# Patient Record
Sex: MALE | Race: OTHER | Employment: OTHER | URBAN - METROPOLITAN AREA
[De-identification: names, ages, dates, MRNs, and addresses within clinical notes are randomized per-mention and may not be internally consistent; named-entity substitution may affect disease eponyms.]

---

## 2017-01-16 ENCOUNTER — PATIENT OUTREACH (OUTPATIENT)
Dept: FAMILY MEDICINE CLINIC | Facility: CLINIC | Age: 67
End: 2017-01-16

## 2017-03-06 ENCOUNTER — PRIOR ORIGINAL RECORDS (OUTPATIENT)
Dept: OTHER | Age: 67
End: 2017-03-06

## 2017-03-06 ENCOUNTER — MED REC SCAN ONLY (OUTPATIENT)
Dept: FAMILY MEDICINE CLINIC | Facility: CLINIC | Age: 67
End: 2017-03-06

## 2017-03-06 ENCOUNTER — OFFICE VISIT (OUTPATIENT)
Dept: FAMILY MEDICINE CLINIC | Facility: CLINIC | Age: 67
End: 2017-03-06

## 2017-03-06 VITALS
WEIGHT: 201 LBS | TEMPERATURE: 98 F | HEIGHT: 69 IN | BODY MASS INDEX: 29.77 KG/M2 | HEART RATE: 60 BPM | DIASTOLIC BLOOD PRESSURE: 76 MMHG | SYSTOLIC BLOOD PRESSURE: 112 MMHG

## 2017-03-06 DIAGNOSIS — Z12.11 COLON CANCER SCREENING: ICD-10-CM

## 2017-03-06 DIAGNOSIS — Z11.59 ENCOUNTER FOR HEPATITIS C SCREENING TEST FOR LOW RISK PATIENT: ICD-10-CM

## 2017-03-06 DIAGNOSIS — R93.1 ELEVATED CORONARY ARTERY CALCIUM SCORE: ICD-10-CM

## 2017-03-06 DIAGNOSIS — Z00.00 ENCOUNTER FOR ANNUAL HEALTH EXAMINATION: Primary | ICD-10-CM

## 2017-03-06 DIAGNOSIS — E78.00 PURE HYPERCHOLESTEROLEMIA: ICD-10-CM

## 2017-03-06 DIAGNOSIS — R73.03 PRE-DIABETES: ICD-10-CM

## 2017-03-06 DIAGNOSIS — E55.9 VITAMIN D DEFICIENCY: ICD-10-CM

## 2017-03-06 DIAGNOSIS — Z12.5 PROSTATE CANCER SCREENING: ICD-10-CM

## 2017-03-06 DIAGNOSIS — R07.2 PRECORDIAL PAIN: ICD-10-CM

## 2017-03-06 LAB
25-HYDROXYVITAMIN D (TOTAL): 20.4 NG/ML (ref 30–100)
ALBUMIN SERPL-MCNC: 3.9 G/DL (ref 3.5–4.8)
ALP LIVER SERPL-CCNC: 80 U/L (ref 45–117)
ALT SERPL-CCNC: 27 U/L (ref 17–63)
AST SERPL-CCNC: 21 U/L (ref 15–41)
BASOPHILS # BLD AUTO: 0.04 X10(3) UL (ref 0–0.1)
BASOPHILS NFR BLD AUTO: 0.6 %
BILIRUB SERPL-MCNC: 0.3 MG/DL (ref 0.1–2)
BUN BLD-MCNC: 15 MG/DL (ref 8–20)
CALCIUM BLD-MCNC: 9.2 MG/DL (ref 8.3–10.3)
CHLORIDE: 109 MMOL/L (ref 101–111)
CHOLEST SMN-MCNC: 167 MG/DL (ref ?–200)
CO2: 26 MMOL/L (ref 22–32)
COMPLEXED PSA SERPL-MCNC: 1.23 NG/ML (ref 0.01–4)
CREAT BLD-MCNC: 1.02 MG/DL (ref 0.7–1.3)
EOSINOPHIL # BLD AUTO: 0.28 X10(3) UL (ref 0–0.3)
EOSINOPHIL NFR BLD AUTO: 4.3 %
ERYTHROCYTE [DISTWIDTH] IN BLOOD BY AUTOMATED COUNT: 12.8 % (ref 11.5–16)
EST. AVERAGE GLUCOSE BLD GHB EST-MCNC: 128 MG/DL (ref 68–126)
GLUCOSE BLD-MCNC: 91 MG/DL (ref 70–99)
HBA1C MFR BLD HPLC: 6.1 % (ref ?–5.7)
HCT VFR BLD AUTO: 43.6 % (ref 37–53)
HDLC SERPL-MCNC: 47 MG/DL (ref 45–?)
HDLC SERPL: 3.55 {RATIO} (ref ?–4.97)
HEPATITIS C VIRUS AB INTERPRETATION: NONREACTIVE
HGB BLD-MCNC: 14 G/DL (ref 13–17)
IMMATURE GRANULOCYTE COUNT: 0.01 X10(3) UL (ref 0–1)
IMMATURE GRANULOCYTE RATIO %: 0.2 %
LDLC SERPL CALC-MCNC: 65 MG/DL (ref ?–130)
LYMPHOCYTES # BLD AUTO: 2.37 X10(3) UL (ref 0.9–4)
LYMPHOCYTES NFR BLD AUTO: 36.6 %
M PROTEIN MFR SERPL ELPH: 7.6 G/DL (ref 6.1–8.3)
MCH RBC QN AUTO: 30.4 PG (ref 27–33.2)
MCHC RBC AUTO-ENTMCNC: 32.1 G/DL (ref 31–37)
MCV RBC AUTO: 94.8 FL (ref 80–99)
MONOCYTES # BLD AUTO: 0.71 X10(3) UL (ref 0.1–0.6)
MONOCYTES NFR BLD AUTO: 11 %
NEUTROPHIL ABS PRELIM: 3.06 X10 (3) UL (ref 1.3–6.7)
NEUTROPHILS # BLD AUTO: 3.06 X10(3) UL (ref 1.3–6.7)
NEUTROPHILS NFR BLD AUTO: 47.3 %
NONHDLC SERPL-MCNC: 120 MG/DL (ref ?–130)
PLATELET # BLD AUTO: 218 10(3)UL (ref 150–450)
POTASSIUM SERPL-SCNC: 4 MMOL/L (ref 3.6–5.1)
RBC # BLD AUTO: 4.6 X10(6)UL (ref 3.8–5.8)
RED CELL DISTRIBUTION WIDTH-SD: 44.3 FL (ref 35.1–46.3)
SODIUM SERPL-SCNC: 141 MMOL/L (ref 136–144)
TRIGLYCERIDES: 274 MG/DL (ref ?–150)
TSI SER-ACNC: 2.39 MIU/ML (ref 0.35–5.5)
VLDL: 55 MG/DL (ref 5–40)
WBC # BLD AUTO: 6.5 X10(3) UL (ref 4–13)

## 2017-03-06 PROCEDURE — 85025 COMPLETE CBC W/AUTO DIFF WBC: CPT | Performed by: FAMILY MEDICINE

## 2017-03-06 PROCEDURE — 84443 ASSAY THYROID STIM HORMONE: CPT | Performed by: FAMILY MEDICINE

## 2017-03-06 PROCEDURE — 93000 ELECTROCARDIOGRAM COMPLETE: CPT | Performed by: FAMILY MEDICINE

## 2017-03-06 PROCEDURE — 96160 PT-FOCUSED HLTH RISK ASSMT: CPT | Performed by: FAMILY MEDICINE

## 2017-03-06 PROCEDURE — 82306 VITAMIN D 25 HYDROXY: CPT | Performed by: FAMILY MEDICINE

## 2017-03-06 PROCEDURE — 86803 HEPATITIS C AB TEST: CPT | Performed by: FAMILY MEDICINE

## 2017-03-06 PROCEDURE — G0439 PPPS, SUBSEQ VISIT: HCPCS | Performed by: FAMILY MEDICINE

## 2017-03-06 PROCEDURE — 99397 PER PM REEVAL EST PAT 65+ YR: CPT | Performed by: FAMILY MEDICINE

## 2017-03-06 PROCEDURE — 80061 LIPID PANEL: CPT | Performed by: FAMILY MEDICINE

## 2017-03-06 PROCEDURE — 83036 HEMOGLOBIN GLYCOSYLATED A1C: CPT | Performed by: FAMILY MEDICINE

## 2017-03-06 PROCEDURE — 36415 COLL VENOUS BLD VENIPUNCTURE: CPT | Performed by: FAMILY MEDICINE

## 2017-03-06 PROCEDURE — 80053 COMPREHEN METABOLIC PANEL: CPT | Performed by: FAMILY MEDICINE

## 2017-03-06 NOTE — PROGRESS NOTES
HPI:   Danya Mathews is a 77year old male who presents for a Medicare Subsequent Annual Wellness visit (Pt already had Initial Annual Wellness). Pt reports feeling well in general. No significant concerns.  He still works at [employer], he is quite encounter (Office Visit) with Bernarda Calix MD.   MEDICAL INFORMATION:   He  has a past medical history of Other and unspecified hyperlipidemia. He  has past surgical history that includes other surgical history.     His family history includes Obesity no drainage or sinus tenderness   Throat: Lips, mucosa, and tongue normal; teeth and gums normal   Neck: Supple, symmetrical, trachea midline, no adenopathy, thyroid: not enlarged, symmetric, no tenderness/mass/nodules, no  Thyromegaly, no JVD   Back:   Sy [E]; Future  -     Lipid Panel [E]; Future  -     CARD ECHO STRESS ECHO/REST AND STRESS (CPT=93271); Future    Pre-diabetes  -     Comp Metabolic Panel (14) [E]; Future  -     Hemoglobin A1C [E];  Future  -     CARD ECHO STRESS ECHO/REST AND STRESS (CPT=933 Interaction;Puzzles;Games; Visiting Friends; Visiting Family    If you are a male age 38-65 or a female age 47-67, do you take aspirin?: Yes    Have you had any immunizations at another office such as Influenza, Hepatitis B, Tetanus, or Pneumococcal?: No week is this?: Correct    What month is it?: Correct    What year is it?: Correct    Recall \"Ball\": Correct    Recall \"Flag\": Correct    Recall \"Tree\": Correct       This section provided for quick review of chart, separate sheet to patient  [facility] Media TOXOIDS AND ACELLULAR PERTUSIS VACCINE (TDAP), >7 YEARS, IM USE            SPECIFIC DISEASE MONITORING Internal Lab or Procedure External Lab or Procedure   Annual Monitoring of Persistent     Medications (ACE/ARB, digoxin diuretics, anticonvulsants.)    P

## 2017-03-07 ENCOUNTER — TELEPHONE (OUTPATIENT)
Dept: FAMILY MEDICINE CLINIC | Facility: CLINIC | Age: 67
End: 2017-03-07

## 2017-03-07 NOTE — TELEPHONE ENCOUNTER
Notes Recorded by Bere Pineda MD on 3/6/2017 at 11:50 PM  Please notify pt or his dtr that labs look good overall, including LDL and HDL cholesterol, blood counts, PSA, kidneys, liver, electrolytes, thyroid and negative Hep C.  The only mild abnormalitie

## 2017-03-09 ENCOUNTER — TELEPHONE (OUTPATIENT)
Dept: FAMILY MEDICINE CLINIC | Facility: CLINIC | Age: 67
End: 2017-03-09

## 2017-03-09 NOTE — TELEPHONE ENCOUNTER
If he'd like a named diet to look up online and follow it's the mediterranean diet. If he'd rather meet with someone in person to brainstorm specific ideas for him I'd recommend he meet with a dietitian.  Let me know if he prefers that, can place referral.

## 2017-03-10 NOTE — TELEPHONE ENCOUNTER
Spoke with the daughter and I advised of the notes from Dr. Eva Quiñones- she states that they were looking for a specific answer when it comes to sweet bread pastries- Nunu Truong states that the pt told his spouse that he could have it everyday just no pop or candy.

## 2017-03-14 ENCOUNTER — TELEPHONE (OUTPATIENT)
Dept: FAMILY MEDICINE CLINIC | Facility: CLINIC | Age: 67
End: 2017-03-14

## 2017-03-14 NOTE — TELEPHONE ENCOUNTER
Information entered into [facility] online- and case # [id number]  This has been sent to the medical director for evaluation-  We should have an answer in 48 hours

## 2017-03-17 ENCOUNTER — HOSPITAL ENCOUNTER (OUTPATIENT)
Dept: CV DIAGNOSTICS | Age: 67
Discharge: HOME | End: 2017-03-17
Attending: FAMILY MEDICINE
Payer: MEDICARE

## 2017-03-17 DIAGNOSIS — R93.1 ELEVATED CORONARY ARTERY CALCIUM SCORE: ICD-10-CM

## 2017-03-17 DIAGNOSIS — R07.2 PRECORDIAL PAIN: ICD-10-CM

## 2017-03-17 DIAGNOSIS — E78.00 PURE HYPERCHOLESTEROLEMIA: ICD-10-CM

## 2017-03-17 DIAGNOSIS — R73.03 PRE-DIABETES: ICD-10-CM

## 2017-03-17 PROCEDURE — 93350 STRESS TTE ONLY: CPT | Performed by: INTERNAL MEDICINE

## 2017-03-17 PROCEDURE — 93018 CV STRESS TEST I&R ONLY: CPT | Performed by: INTERNAL MEDICINE

## 2017-03-17 PROCEDURE — 93017 CV STRESS TEST TRACING ONLY: CPT

## 2017-03-17 PROCEDURE — 93350 STRESS TTE ONLY: CPT

## 2017-03-17 NOTE — TELEPHONE ENCOUNTER
[facility] to find out what the status of this case is as the pt is scheduled today for this test  I spoke with Ms. Mccurdy and gave additional information- case was approved  [id number] valid until 5/1/17

## 2017-03-21 ENCOUNTER — TELEPHONE (OUTPATIENT)
Dept: FAMILY MEDICINE CLINIC | Facility: CLINIC | Age: 67
End: 2017-03-21

## 2017-03-21 NOTE — TELEPHONE ENCOUNTER
----- Message from Sayra Cardoso MD sent at 3/20/2017 11:36 PM CDT -----  Please notify pt's dtr that overall the stress test is good news--there is no obvious abnormality.  However, I'd still like him to meet with a heart doctor-I want to get their opinion

## 2017-03-21 NOTE — TELEPHONE ENCOUNTER
Called the daughter back and advised of the test results and recommendations she will call [facility] tomorrow and get back to me so I can put in the referral for the pt's insurance

## 2017-03-21 NOTE — TELEPHONE ENCOUNTER
Message  Received:  Today       EDIN Sena Melissa Nurse       Caller: BRENDAN--DAUGHTER (Today, 4:59 PM)                     PLEASE CALL BRENDAN BACK WHEN AVAILABLE--[phone]

## 2017-03-24 ENCOUNTER — TELEPHONE (OUTPATIENT)
Dept: FAMILY MEDICINE CLINIC | Facility: CLINIC | Age: 67
End: 2017-03-24

## 2017-03-24 NOTE — TELEPHONE ENCOUNTER
Abagail Shone is aware Dr Rhett Menchaca will not be in office today due to he is in surgery. Regina aware per Dr Rhett Menchaca I can schedule the pt for a colonoscopy and he will see him the morning of the procedure.  Abagail Shone would like instructions for the procedure mailed to her

## 2017-03-24 NOTE — TELEPHONE ENCOUNTER
I spoke to [place] du sac and scheduled the pt for his colonoscopy on 4/28/17 at [facility]. Instructions mailed to the pt. Prep sent to [facility].

## 2017-03-25 ENCOUNTER — TELEPHONE (OUTPATIENT)
Dept: FAMILY MEDICINE CLINIC | Facility: CLINIC | Age: 67
End: 2017-03-25

## 2017-03-28 ENCOUNTER — TELEPHONE (OUTPATIENT)
Dept: FAMILY MEDICINE CLINIC | Facility: CLINIC | Age: 67
End: 2017-03-28

## 2017-03-28 NOTE — TELEPHONE ENCOUNTER
I spoke to [place] du sac and we r/s the pt's colonoscopy to 5/1/17 at [facility]. lorena

## 2017-03-31 ENCOUNTER — TELEPHONE (OUTPATIENT)
Dept: FAMILY MEDICINE CLINIC | Facility: CLINIC | Age: 67
End: 2017-03-31

## 2017-03-31 ENCOUNTER — PRIOR ORIGINAL RECORDS (OUTPATIENT)
Dept: OTHER | Age: 67
End: 2017-03-31

## 2017-03-31 ENCOUNTER — MYAURORA ACCOUNT LINK (OUTPATIENT)
Dept: OTHER | Age: 67
End: 2017-03-31

## 2017-03-31 NOTE — TELEPHONE ENCOUNTER
Never received a call back informing  Us of the doc that he was seeing    Referral placed in the [facility] system for Dr. Meena Riley cardiology-  Ref # [id number] valid 3/31/17-10/01/17 for 6 visits    Faxed a copy to Dr. Brigette Adams office [phone]

## 2017-04-07 LAB
ALBUMIN: 3.9 G/DL
ALKALINE PHOSPHATATE(ALK PHOS): 80 IU/L
BILIRUBIN TOTAL: 0.3 MG/DL
BUN: 15 MG/DL
CALCIUM: 9.2 MG/DL
CHLORIDE: 109 MEQ/L
CHOLESTEROL, TOTAL: 167 MG/DL
CREATININE, SERUM: 1.02 MG/DL
GLUCOSE: 91 MG/DL
HDL CHOLESTEROL: 47 MG/DL
HEMATOCRIT: 43.6 %
HEMOGLOBIN A1C: 6.1 %
HEMOGLOBIN: 14 G/DL
LDL CHOLESTEROL: 65 MG/DL
PLATELETS: 218 K/UL
POTASSIUM, SERUM: 4 MEQ/L
PROTEIN, TOTAL: 7.6 G/DL
RED BLOOD COUNT: 4.6 X 10-6/U
SGOT (AST): 21 IU/L
SGPT (ALT): 27 IU/L
SODIUM: 141 MEQ/L
THYROID STIMULATING HORMONE: 2.39 MLU/L
TRIGLYCERIDES: 274 MG/DL
VITAMIN D 25-OH: 20.4 NG/ML
WHITE BLOOD COUNT: 6.5 X 10-3/U

## 2017-04-10 ENCOUNTER — TELEPHONE (OUTPATIENT)
Dept: FAMILY MEDICINE CLINIC | Facility: CLINIC | Age: 67
End: 2017-04-10

## 2017-04-10 DIAGNOSIS — Z12.11 ENCOUNTER FOR SCREENING COLONOSCOPY: Primary | ICD-10-CM

## 2017-04-10 NOTE — TELEPHONE ENCOUNTER
I spoke to [place] du sac. She is aware Dr Ben Burns does not do colonoscopies at [address] [place] [address] is aware there is a Dr Nancy Sanches who does colonoscopy at [facility] [place].  [place] du sac states she will call Dr Mancini Shelter office to see if this is an option for her dad and call me back to debora

## 2017-04-10 NOTE — TELEPHONE ENCOUNTER
Spoke with [place] du sac and she states that her dad would like to see Dr. Aditya Bowen for the colonoscopy- he is local and can do the procedure at [facility].    I checked and Dr. Aditya Bowen is in network so I placed the referral in [facility] online # [id number] valid 4/10/17-1

## 2017-04-12 NOTE — TELEPHONE ENCOUNTER
I spoke to [place] du sac and she states the pt will be seeing Dr Mel Vail for his colonoscopy. Colonoscopy with Dr Raisa Leiva is cancelled.  lorena

## 2017-04-18 ENCOUNTER — TELEPHONE (OUTPATIENT)
Dept: FAMILY MEDICINE CLINIC | Facility: CLINIC | Age: 67
End: 2017-04-18

## 2017-04-18 NOTE — TELEPHONE ENCOUNTER
Received fax from Dr. Liborio Hogan office for endoscopy operative reports with corresponding pathology and any labs or diagnostic imagine within the past year. There are no endoscopy reports or imaging reports. What labs do you want sent from 3/6/17?

## 2017-05-30 ENCOUNTER — MED REC SCAN ONLY (OUTPATIENT)
Dept: FAMILY MEDICINE CLINIC | Facility: CLINIC | Age: 67
End: 2017-05-30

## 2017-06-08 ENCOUNTER — MED REC SCAN ONLY (OUTPATIENT)
Dept: FAMILY MEDICINE CLINIC | Facility: CLINIC | Age: 67
End: 2017-06-08

## 2017-06-13 ENCOUNTER — MED REC SCAN ONLY (OUTPATIENT)
Dept: FAMILY MEDICINE CLINIC | Facility: CLINIC | Age: 67
End: 2017-06-13

## 2018-01-12 ENCOUNTER — TELEPHONE (OUTPATIENT)
Dept: FAMILY MEDICINE CLINIC | Facility: CLINIC | Age: 68
End: 2018-01-12

## 2018-02-01 ENCOUNTER — PATIENT OUTREACH (OUTPATIENT)
Dept: FAMILY MEDICINE CLINIC | Facility: CLINIC | Age: 68
End: 2018-02-01

## 2018-03-20 NOTE — TELEPHONE ENCOUNTER
After Visit Summary   3/20/2018    Hugo Longoria    MRN: [id number]           Patient Information     Date Of Birth          1961        Visit Information        Provider Department      3/20/2018 8:00 PM PH BED 2 [facility]        Today's Diagnoses     Insomnia, unspecified type           Follow-ups after your visit        Your next 10 appointments already scheduled     Mar 28, 2018  8:30 AM CDT   Return Sleep Patient with Christiano Ponce PA-C   [facility] ([facility])    [address] [place] [id number] [phone]              Who to contact     If you have questions or need follow up information about today's clinic visit or your schedule please contact [facility] directly at [phone].  Normal or non-critical lab and imaging results will be communicated to you by MyChart, letter or phone within 4 business days after the clinic has received the results. If you do not hear from us within 7 days, please contact the clinic through [employer] or phone. If you have a critical or abnormal lab result, we will notify you by phone as soon as possible.  Submit refill requests through [employer] or call your pharmacy and they will forward the refill request to us. Please allow 3 business days for your refill to be completed.          Additional Information About Your Visit        MyChart Information     Nu[employer] gives you secure access to your electronic health record. If you see a primary care provider, you can also send messages to your care team and make appointments. If you have questions, please call your primary care clinic.  If you do not have a primary care provider, please call [phone] and they will assist you.        Care EveryWhere ID     This is your Care EveryWhere ID. This could be used by other organizations to access your [facility] medical records  [id number]         Blood  Patient has been notified by [address] [facility] speaking staff, Jeff Ortiz MA     Recall in epic for 1 year.  Will need annual exam and labs Pressure from Last 3 Encounters:   02/19/18 138/80   02/12/18 138/68   01/23/18 132/82    Weight from Last 3 Encounters:   02/19/18 103.5 kg (228 lb 3.2 oz)   02/12/18 105.2 kg (232 lb)   01/23/18 105.3 kg (232 lb 3.2 oz)              We Performed the Following     Comprehensive Sleep Study        Primary Care Provider Office Phone # Fax #    Viktor Gomez,  [phone] [address] [place] [address]        Equal Access to Services     [facility]: Hadii jose l ku hadasho Soomaali, waaxda luqadaha, qaybta kaalmada adesandeepyacarmine, kelly khalil . So [facility] [phone].    ATENCIÓN: Si habla español, tiene a byrd disposición servicios gratuitos de asistencia lingüística. [facility] [phone].    We comply with applicable federal civil rights laws and [place] laws. We do not discriminate on the basis of race, color, national origin, age, disability, sex, sexual orientation, or gender identity.            Thank you!     Thank you for choosing [facility]  for your care. Our goal is always to provide you with excellent care. Hearing back from our patients is one way we can continue to improve our services. Please take a few minutes to complete the written survey that you may receive in the mail after your visit with us. Thank you!             Your Updated Medication List - Protect others around you: Learn how to safely use, store and throw away your medicines at www.disposemymeds.org.          This list is accurate as of 3/20/18 11:59 PM.  Always use your most recent med list.                   Brand Name Dispense Instructions for use Diagnosis    amoxicillin-clavulanate 875-125 MG per tablet    AUGMENTIN    20 tablet    Take 1 tablet by mouth 2 times daily    Acute sinusitis with symptoms > 10 days       ascorbic acid 250 MG Chew chewable tablet    vitamin C     Take 250 mg by mouth daily    Primary osteoarthritis of right knee        atorvastatin 20 MG tablet    LIPITOR    30 tablet    Take 1 tablet (20 mg) by mouth daily    CARDIOVASCULAR SCREENING; LDL GOAL LESS THAN 130       fluticasone 50 MCG/ACT spray    FLONASE    1 Bottle    Spray 1-2 sprays into both nostrils daily    Acute sinusitis with symptoms > 10 days       LANsoprazole 30 MG CR capsule    PREVACID    30 capsule    Take 1 pill daily if Ranitidne fails.    Chronic gastritis without bleeding, unspecified gastritis type, Gastroesophageal reflux disease with esophagitis       lisinopril 20 MG tablet    PRINIVIL/ZESTRIL    90 tablet    TAKE ONE-HALF TABLET BY MOUTH ONCE DAILY    HTN, goal below 140/90       MULTIVITAMINS PO      Take  by mouth.        zolpidem 10 MG tablet    AMBIEN    1 tablet    Take tablet by mouth 15 minutes prior to sleep, for Sleep Study    Insomnia, unspecified type

## 2018-04-04 RX ORDER — ATORVASTATIN CALCIUM 20 MG/1
TABLET, FILM COATED ORAL
Qty: 30 TABLET | Refills: 1 | Status: SHIPPED | OUTPATIENT
Start: 2018-04-04 | End: 2018-05-03

## 2018-04-04 NOTE — TELEPHONE ENCOUNTER
Last refill: 12- #30 with 11 refills    Last Visit: 3-  Next Visit: Future Appointments  Date Time Provider Sonam Elisa   5/16/2018 3:00 PM Piter Wheeler MD [facility] HARJEET Nicolas         Forward to Dr. Cathern Hatchet please advise on refills.  Thank

## 2018-05-04 RX ORDER — ATORVASTATIN CALCIUM 20 MG/1
TABLET, FILM COATED ORAL
Qty: 90 TABLET | Refills: 1 | Status: SHIPPED | OUTPATIENT
Start: 2018-05-04 | End: 2018-07-11

## 2018-05-16 ENCOUNTER — OFFICE VISIT (OUTPATIENT)
Dept: FAMILY MEDICINE CLINIC | Facility: CLINIC | Age: 68
End: 2018-05-16

## 2018-05-16 VITALS
BODY MASS INDEX: 30.56 KG/M2 | SYSTOLIC BLOOD PRESSURE: 120 MMHG | HEIGHT: 68 IN | TEMPERATURE: 98 F | WEIGHT: 201.63 LBS | RESPIRATION RATE: 20 BRPM | DIASTOLIC BLOOD PRESSURE: 84 MMHG | HEART RATE: 68 BPM

## 2018-05-16 DIAGNOSIS — Z12.5 ENCOUNTER FOR SCREENING FOR MALIGNANT NEOPLASM OF PROSTATE: ICD-10-CM

## 2018-05-16 DIAGNOSIS — H91.93 DECREASED HEARING OF BOTH EARS: ICD-10-CM

## 2018-05-16 DIAGNOSIS — E55.9 VITAMIN D DEFICIENCY: ICD-10-CM

## 2018-05-16 DIAGNOSIS — R73.03 PRE-DIABETES: ICD-10-CM

## 2018-05-16 DIAGNOSIS — R93.1 ELEVATED CORONARY ARTERY CALCIUM SCORE: ICD-10-CM

## 2018-05-16 DIAGNOSIS — R73.09 ELEVATED HEMOGLOBIN A1C: ICD-10-CM

## 2018-05-16 DIAGNOSIS — G89.29 CHRONIC PAIN OF LEFT KNEE: ICD-10-CM

## 2018-05-16 DIAGNOSIS — R39.198 SLOW URINARY STREAM: ICD-10-CM

## 2018-05-16 DIAGNOSIS — Z00.00 ENCOUNTER FOR ANNUAL HEALTH EXAMINATION: Primary | ICD-10-CM

## 2018-05-16 DIAGNOSIS — E78.00 PURE HYPERCHOLESTEROLEMIA: ICD-10-CM

## 2018-05-16 DIAGNOSIS — M25.562 CHRONIC PAIN OF LEFT KNEE: ICD-10-CM

## 2018-05-16 DIAGNOSIS — Z23 NEED FOR VACCINATION: ICD-10-CM

## 2018-05-16 PROCEDURE — 82306 VITAMIN D 25 HYDROXY: CPT | Performed by: FAMILY MEDICINE

## 2018-05-16 PROCEDURE — 80053 COMPREHEN METABOLIC PANEL: CPT | Performed by: FAMILY MEDICINE

## 2018-05-16 PROCEDURE — 36415 COLL VENOUS BLD VENIPUNCTURE: CPT | Performed by: FAMILY MEDICINE

## 2018-05-16 PROCEDURE — 83036 HEMOGLOBIN GLYCOSYLATED A1C: CPT | Performed by: FAMILY MEDICINE

## 2018-05-16 PROCEDURE — G0439 PPPS, SUBSEQ VISIT: HCPCS | Performed by: FAMILY MEDICINE

## 2018-05-16 PROCEDURE — 80061 LIPID PANEL: CPT | Performed by: FAMILY MEDICINE

## 2018-05-16 NOTE — PROGRESS NOTES
HPI:   Jaime German is a 76year old male who presents for a Medicare Subsequent Annual Wellness visit (Pt already had Initial Annual Wellness).     Pt c/o intermittent L knee pain for over a year, comes and goes, but seems to be getting more severe/pro a Living Will on file in [facility] Not Discussed       He does NOT have a Power of  for [employer] on file in [facility]    Not Discussed         He has never smoked tobacco.  Mr. Alee Whitley already takes aspirin and has it on his medication list.   CAGE Alco that he does not drink alcohol or use drugs. REVIEW OF SYSTEMS:   Review of Systems   Constitutional: Negative. HENT: Negative. Eyes: Negative. Respiratory: Negative. Cardiovascular: Negative. Gastrointestinal: Negative.     Endocrine: Ne normal and breath sounds normal.   Abdominal: Soft. Bowel sounds are normal. He exhibits no mass. There is no hepatosplenomegaly. No hernia. Genitourinary:   Genitourinary Comments: deferred   Musculoskeletal: Normal range of motion.    Normal bi knee exa of both ears  -stable, no further w/u or tx at this time    Vitamin D deficiency--assess control today  -     VITAMIN D, 25-HYDROXY; Future    Slow urinary stream  -     PSA SCREEN; Future    Encounter for screening for malignant neoplasm of prostate   - every 10 years No results found for this or any previous visit. No flowsheet data found. Fecal Occult Blood Annually No results found for: FOB No flowsheet data found.     Glaucoma Screening      Ophthalmology Visit Annually: Diabetics, FHx Glaucoma, AA

## 2018-05-18 ENCOUNTER — TELEPHONE (OUTPATIENT)
Dept: FAMILY MEDICINE CLINIC | Facility: CLINIC | Age: 68
End: 2018-05-18

## 2018-05-18 DIAGNOSIS — R73.03 PRE-DIABETES: Primary | ICD-10-CM

## 2018-05-18 DIAGNOSIS — R73.09 ELEVATED HEMOGLOBIN A1C: ICD-10-CM

## 2018-05-18 DIAGNOSIS — E78.00 PURE HYPERCHOLESTEROLEMIA: ICD-10-CM

## 2018-05-18 NOTE — TELEPHONE ENCOUNTER
----- Message from Calvin Chang MD sent at 5/17/2018 11:41 PM CDT -----  Please notify(Qatari preferred) blood sugar has gone up a bit and he is nearly diaetic. Similarly triglycerides a bit elevated.   Time to crack down on his eating habits, decreasing

## 2018-05-24 ENCOUNTER — TELEPHONE (OUTPATIENT)
Dept: FAMILY MEDICINE CLINIC | Facility: CLINIC | Age: 68
End: 2018-05-24

## 2018-05-24 NOTE — TELEPHONE ENCOUNTER
Patient's daughter advised of lab results. See telephone encounter from 5/18/18. Recall In Epic for A1C and lipids. Verbalized understanding. No further questions.

## 2018-05-24 NOTE — TELEPHONE ENCOUNTER
Daughter would like patient's most recent results. She said we call him with them the other day and he would like us to explain them to his daughter.  She is aware that Dr. Charisma Wallace is out of the office until tomorrow and a call back tomorrow would be fine with

## 2018-07-06 RX ORDER — ATORVASTATIN CALCIUM 20 MG/1
TABLET, FILM COATED ORAL
Qty: 30 TABLET | Refills: 0 | OUTPATIENT
Start: 2018-07-06

## 2018-07-06 NOTE — TELEPHONE ENCOUNTER
Last refill on Atorvastatin 5 4 2018 #90 with 1 refill   Receipt confirmed by pharmacy   Refill refused

## 2018-07-11 RX ORDER — ATORVASTATIN CALCIUM 20 MG/1
TABLET, FILM COATED ORAL
Qty: 30 TABLET | Refills: 0 | OUTPATIENT
Start: 2018-07-11

## 2018-07-11 RX ORDER — ATORVASTATIN CALCIUM 20 MG/1
TABLET, FILM COATED ORAL
Qty: 90 TABLET | Refills: 1 | Status: SHIPPED | OUTPATIENT
Start: 2018-07-11 | End: 2018-11-30

## 2018-07-11 NOTE — TELEPHONE ENCOUNTER
[facility] pharmacy called because they received a denial for the atorvastatin. I explained that there is not a denial in the pt's chart.     Pharmacy states that they did not get the 90 day refill in April

## 2018-11-17 ENCOUNTER — TELEPHONE (OUTPATIENT)
Dept: FAMILY MEDICINE CLINIC | Facility: CLINIC | Age: 68
End: 2018-11-17

## 2018-11-17 NOTE — TELEPHONE ENCOUNTER
PT NEEDS REFILL OF     ATORVASTATIN 20 MG Oral Tab    PLEASE SEND 90 DAY SUPPLY TO   [facility]:PT HAS APT ON SAT 12/8/18 W/DR ROMAN    THANK YOU

## 2018-11-30 RX ORDER — ATORVASTATIN CALCIUM 20 MG/1
TABLET, FILM COATED ORAL
Qty: 90 TABLET | Refills: 1 | Status: SHIPPED | OUTPATIENT
Start: 2018-11-30 | End: 2018-12-10

## 2018-11-30 NOTE — TELEPHONE ENCOUNTER
Last refill on Atorvastatin #90 with 1 refill on 7 11 2018   Last lipid panel on 5 16 2018   Total - 148  Trig- 232  HDL- 48  LDL-54  OV on 12 8 2018

## 2018-12-08 ENCOUNTER — OFFICE VISIT (OUTPATIENT)
Dept: FAMILY MEDICINE CLINIC | Facility: CLINIC | Age: 68
End: 2018-12-08
Payer: MEDICARE

## 2018-12-08 VITALS
SYSTOLIC BLOOD PRESSURE: 128 MMHG | TEMPERATURE: 97 F | WEIGHT: 197 LBS | RESPIRATION RATE: 16 BRPM | BODY MASS INDEX: 29.18 KG/M2 | DIASTOLIC BLOOD PRESSURE: 64 MMHG | HEIGHT: 69 IN | HEART RATE: 60 BPM

## 2018-12-08 DIAGNOSIS — E78.00 PURE HYPERCHOLESTEROLEMIA: ICD-10-CM

## 2018-12-08 DIAGNOSIS — R93.1 AGATSTON CORONARY ARTERY CALCIUM SCORE GREATER THAN 400: ICD-10-CM

## 2018-12-08 DIAGNOSIS — R73.09 ELEVATED HEMOGLOBIN A1C: ICD-10-CM

## 2018-12-08 DIAGNOSIS — R73.03 PRE-DIABETES: Primary | ICD-10-CM

## 2018-12-08 DIAGNOSIS — Z92.89 H/O CARDIOVASCULAR STRESS TEST: ICD-10-CM

## 2018-12-08 PROCEDURE — 80061 LIPID PANEL: CPT | Performed by: FAMILY MEDICINE

## 2018-12-08 PROCEDURE — 90670 PCV13 VACCINE IM: CPT | Performed by: FAMILY MEDICINE

## 2018-12-08 PROCEDURE — G0008 ADMIN INFLUENZA VIRUS VAC: HCPCS | Performed by: FAMILY MEDICINE

## 2018-12-08 PROCEDURE — 83036 HEMOGLOBIN GLYCOSYLATED A1C: CPT | Performed by: FAMILY MEDICINE

## 2018-12-08 PROCEDURE — 36415 COLL VENOUS BLD VENIPUNCTURE: CPT | Performed by: FAMILY MEDICINE

## 2018-12-08 PROCEDURE — G0009 ADMIN PNEUMOCOCCAL VACCINE: HCPCS | Performed by: FAMILY MEDICINE

## 2018-12-08 PROCEDURE — 90653 IIV ADJUVANT VACCINE IM: CPT | Performed by: FAMILY MEDICINE

## 2018-12-08 PROCEDURE — 99214 OFFICE O/P EST MOD 30 MIN: CPT | Performed by: FAMILY MEDICINE

## 2018-12-08 NOTE — PROGRESS NOTES
Paz Lincoln is a 76year old male. HPI:   Patient here to f/u on his blood work from last spring.       Drinks water, 1 coffee and 12 oz 2% milk daily, no pop, no juice, rare alcohol.    1 donut consumed every day (homemade), 1 ear of corn every day (at nourished,in no apparent distress  NECK: supple,no adenopathy,no JVD, no thyromegaly  LUNGS: clear to auscultation  CARDIO: RRR without murmur  EXTREMITIES: no cyanosis, clubbing or edema    ASSESSMENT AND PLAN:   Diagnoses and all orders for this visit:

## 2018-12-10 ENCOUNTER — TELEPHONE (OUTPATIENT)
Dept: FAMILY MEDICINE CLINIC | Facility: CLINIC | Age: 68
End: 2018-12-10

## 2018-12-10 DIAGNOSIS — R73.03 PREDIABETES: Primary | ICD-10-CM

## 2018-12-10 RX ORDER — ATORVASTATIN CALCIUM 20 MG/1
TABLET, FILM COATED ORAL
Qty: 90 TABLET | Refills: 3 | Status: SHIPPED | OUTPATIENT
Start: 2018-12-10 | End: 2019-09-09

## 2018-12-10 NOTE — TELEPHONE ENCOUNTER
----- Message from Cole Zuniga MD sent at 12/9/2018 12:28 PM CST -----  pelase notify patient blood sugar stable, hanging out in prediabetes range very close to diabetes. Cholesterol panel looks good.   His eating habits/exercise have been pretty good ov

## 2018-12-10 NOTE — TELEPHONE ENCOUNTER
Patient has been notified, verbalized understanding of information. Denies further questions. Recall placed for 6 months.

## 2019-01-01 ENCOUNTER — OFFICE VISIT (OUTPATIENT)
Dept: HEMATOLOGY/ONCOLOGY | Facility: HOSPITAL | Age: 69
End: 2019-01-01
Attending: INTERNAL MEDICINE
Payer: MEDICARE

## 2019-01-01 VITALS
SYSTOLIC BLOOD PRESSURE: 134 MMHG | TEMPERATURE: 98 F | HEART RATE: 60 BPM | DIASTOLIC BLOOD PRESSURE: 80 MMHG | RESPIRATION RATE: 16 BRPM | BODY MASS INDEX: 31.67 KG/M2 | WEIGHT: 209 LBS | HEIGHT: 67.99 IN

## 2019-01-01 DIAGNOSIS — I26.99 BILATERAL PULMONARY EMBOLISM (HCC): ICD-10-CM

## 2019-01-01 DIAGNOSIS — R11.0 CHEMOTHERAPY-INDUCED NAUSEA: ICD-10-CM

## 2019-01-01 DIAGNOSIS — R06.2 WHEEZING: ICD-10-CM

## 2019-01-01 DIAGNOSIS — Z79.01 ON CONTINUOUS ORAL ANTICOAGULATION: ICD-10-CM

## 2019-01-01 DIAGNOSIS — I82.412 ACUTE DEEP VEIN THROMBOSIS (DVT) OF FEMORAL VEIN OF LEFT LOWER EXTREMITY (HCC): ICD-10-CM

## 2019-01-01 DIAGNOSIS — C25.9 PANCREATIC CARCINOMA (HCC): Primary | ICD-10-CM

## 2019-01-01 DIAGNOSIS — C78.00 MALIGNANT NEOPLASM METASTATIC TO LUNG, UNSPECIFIED LATERALITY (HCC): ICD-10-CM

## 2019-01-01 DIAGNOSIS — R53.0 NEOPLASTIC (MALIGNANT) RELATED FATIGUE: ICD-10-CM

## 2019-01-01 DIAGNOSIS — C78.7 LIVER METASTASES (HCC): ICD-10-CM

## 2019-01-01 DIAGNOSIS — T45.1X5A CHEMOTHERAPY-INDUCED NAUSEA: ICD-10-CM

## 2019-01-01 DIAGNOSIS — Z86.718 HISTORY OF DVT (DEEP VEIN THROMBOSIS): ICD-10-CM

## 2019-01-01 DIAGNOSIS — T45.1X5D ADVERSE EFFECT OF CHEMOTHERAPY, SUBSEQUENT ENCOUNTER: ICD-10-CM

## 2019-01-01 PROCEDURE — 99215 OFFICE O/P EST HI 40 MIN: CPT | Performed by: INTERNAL MEDICINE

## 2019-01-01 PROCEDURE — 96411 CHEMO IV PUSH ADDL DRUG: CPT

## 2019-01-01 PROCEDURE — 96367 TX/PROPH/DG ADDL SEQ IV INF: CPT

## 2019-01-01 PROCEDURE — 86301 IMMUNOASSAY TUMOR CA 19-9: CPT

## 2019-01-01 PROCEDURE — 96413 CHEMO IV INFUSION 1 HR: CPT

## 2019-01-01 PROCEDURE — 96375 TX/PRO/DX INJ NEW DRUG ADDON: CPT

## 2019-01-01 PROCEDURE — 96372 THER/PROPH/DIAG INJ SC/IM: CPT

## 2019-01-01 PROCEDURE — 80053 COMPREHEN METABOLIC PANEL: CPT

## 2019-01-01 PROCEDURE — 85025 COMPLETE CBC W/AUTO DIFF WBC: CPT

## 2019-01-01 RX ORDER — LORAZEPAM 2 MG/ML
INJECTION INTRAMUSCULAR EVERY 4 HOURS PRN
Status: CANCELLED | OUTPATIENT
Start: 2019-01-01

## 2019-01-01 RX ORDER — ATROPINE SULFATE 0.4 MG/ML
0.2 AMPUL (ML) INJECTION ONCE
Status: CANCELLED | OUTPATIENT
Start: 2019-01-01

## 2019-01-01 RX ORDER — LORAZEPAM 0.5 MG/1
TABLET ORAL EVERY 4 HOURS PRN
Status: CANCELLED | OUTPATIENT
Start: 2019-01-01

## 2019-01-01 RX ORDER — METOCLOPRAMIDE HYDROCHLORIDE 5 MG/ML
10 INJECTION INTRAMUSCULAR; INTRAVENOUS EVERY 6 HOURS PRN
Status: CANCELLED | OUTPATIENT
Start: 2019-01-01

## 2019-01-01 RX ORDER — FLUOROURACIL 50 MG/ML
400 INJECTION, SOLUTION INTRAVENOUS ONCE
Status: COMPLETED | OUTPATIENT
Start: 2019-01-01 | End: 2019-01-01

## 2019-01-01 RX ORDER — FLUOROURACIL 50 MG/ML
2400 INJECTION, SOLUTION INTRAVENOUS CONTINUOUS
Status: DISCONTINUED | OUTPATIENT
Start: 2019-01-01 | End: 2019-01-01

## 2019-01-01 RX ORDER — ONDANSETRON 2 MG/ML
8 INJECTION INTRAMUSCULAR; INTRAVENOUS EVERY 6 HOURS PRN
Status: CANCELLED | OUTPATIENT
Start: 2019-01-01

## 2019-01-01 RX ORDER — PROCHLORPERAZINE MALEATE 10 MG
10 TABLET ORAL EVERY 6 HOURS PRN
Status: CANCELLED | OUTPATIENT
Start: 2019-01-01

## 2019-01-01 RX ORDER — FLUOROURACIL 50 MG/ML
2400 INJECTION, SOLUTION INTRAVENOUS CONTINUOUS
Status: CANCELLED | OUTPATIENT
Start: 2019-01-01

## 2019-01-01 RX ORDER — ATROPINE SULFATE 0.4 MG/ML
0.2 AMPUL (ML) INJECTION ONCE
Status: COMPLETED | OUTPATIENT
Start: 2019-01-01 | End: 2019-01-01

## 2019-01-01 RX ORDER — FLUOROURACIL 50 MG/ML
400 INJECTION, SOLUTION INTRAVENOUS ONCE
Status: CANCELLED | OUTPATIENT
Start: 2019-01-01

## 2019-01-01 RX ORDER — SODIUM CHLORIDE 0.9 % (FLUSH) 0.9 %
10 SYRINGE (ML) INJECTION ONCE
Status: CANCELLED | OUTPATIENT
Start: 2019-01-01

## 2019-01-01 RX ORDER — SODIUM CHLORIDE 0.9 % (FLUSH) 0.9 %
10 SYRINGE (ML) INJECTION ONCE
Status: COMPLETED | OUTPATIENT
Start: 2019-01-01 | End: 2019-01-01

## 2019-01-01 RX ADMIN — FLUOROURACIL 5000 MG: 50 INJECTION, SOLUTION INTRAVENOUS at 13:24:00

## 2019-01-01 RX ADMIN — SODIUM CHLORIDE 0.9 % (FLUSH) 10 ML: 0.9 % SYRINGE (ML) INJECTION at 11:15:00

## 2019-01-01 RX ADMIN — ATROPINE SULFATE 0.2 MG: 0.4 MG/ML AMPUL (ML) INJECTION at 11:34:00

## 2019-01-01 RX ADMIN — FLUOROURACIL 850 MG: 50 INJECTION, SOLUTION INTRAVENOUS at 13:15:00

## 2019-02-08 ENCOUNTER — PATIENT OUTREACH (OUTPATIENT)
Dept: FAMILY MEDICINE CLINIC | Facility: CLINIC | Age: 69
End: 2019-02-08

## 2019-02-19 ENCOUNTER — TELEPHONE (OUTPATIENT)
Dept: FAMILY MEDICINE CLINIC | Facility: CLINIC | Age: 69
End: 2019-02-19

## 2019-02-19 ENCOUNTER — HOSPITAL ENCOUNTER (INPATIENT)
Facility: HOSPITAL | Age: 69
LOS: 3 days | Discharge: HOME | DRG: 435 | End: 2019-02-22
Attending: INTERNAL MEDICINE | Admitting: INTERNAL MEDICINE
Payer: MEDICARE

## 2019-02-19 DIAGNOSIS — I26.99 ACUTE PULMONARY EMBOLISM WITHOUT ACUTE COR PULMONALE, UNSPECIFIED PULMONARY EMBOLISM TYPE (HCC): Primary | ICD-10-CM

## 2019-02-19 PROBLEM — I82.90 VTE (VENOUS THROMBOEMBOLISM): Status: ACTIVE | Noted: 2019-02-19

## 2019-02-19 PROCEDURE — 99223 1ST HOSP IP/OBS HIGH 75: CPT | Performed by: HOSPITALIST

## 2019-02-19 RX ORDER — ATORVASTATIN CALCIUM 20 MG/1
20 TABLET, FILM COATED ORAL NIGHTLY
Status: DISCONTINUED | OUTPATIENT
Start: 2019-02-19 | End: 2019-02-22

## 2019-02-19 RX ORDER — ACETAMINOPHEN 325 MG/1
650 TABLET ORAL EVERY 6 HOURS PRN
Status: DISCONTINUED | OUTPATIENT
Start: 2019-02-19 | End: 2019-02-22

## 2019-02-19 RX ORDER — ONDANSETRON 2 MG/ML
4 INJECTION INTRAMUSCULAR; INTRAVENOUS EVERY 6 HOURS PRN
Status: DISCONTINUED | OUTPATIENT
Start: 2019-02-19 | End: 2019-02-22

## 2019-02-19 RX ORDER — HEPARIN SODIUM 5000 [USP'U]/ML
80 INJECTION INTRAVENOUS; SUBCUTANEOUS ONCE
Status: COMPLETED | OUTPATIENT
Start: 2019-02-19 | End: 2019-02-20

## 2019-02-19 RX ORDER — METOCLOPRAMIDE HYDROCHLORIDE 5 MG/ML
10 INJECTION INTRAMUSCULAR; INTRAVENOUS EVERY 8 HOURS PRN
Status: DISCONTINUED | OUTPATIENT
Start: 2019-02-19 | End: 2019-02-22

## 2019-02-19 RX ORDER — HEPARIN SODIUM AND DEXTROSE 10000; 5 [USP'U]/100ML; G/100ML
INJECTION INTRAVENOUS CONTINUOUS
Status: DISCONTINUED | OUTPATIENT
Start: 2019-02-20 | End: 2019-02-22

## 2019-02-19 RX ORDER — HEPARIN SODIUM AND DEXTROSE 10000; 5 [USP'U]/100ML; G/100ML
18 INJECTION INTRAVENOUS ONCE
Status: COMPLETED | OUTPATIENT
Start: 2019-02-19 | End: 2019-02-20

## 2019-02-19 NOTE — TELEPHONE ENCOUNTER
Spoke with the daughter and asked if the pt has traveled recently- she states that He went to [address] [place] [address] on a [email or web address] bus.     I advised that if the calf is swollen then he should be evaluated for a blood clot- advised that the pt can go to the urgent care or

## 2019-02-20 ENCOUNTER — APPOINTMENT (OUTPATIENT)
Dept: CV DIAGNOSTICS | Facility: HOSPITAL | Age: 69
DRG: 435 | End: 2019-02-20
Attending: HOSPITALIST
Payer: MEDICARE

## 2019-02-20 ENCOUNTER — APPOINTMENT (OUTPATIENT)
Dept: CT IMAGING | Facility: HOSPITAL | Age: 69
DRG: 435 | End: 2019-02-20
Attending: HOSPITALIST
Payer: MEDICARE

## 2019-02-20 LAB
AFP-TM SERPL-MCNC: 4.7 NG/ML (ref ?–8)
ALBUMIN SERPL-MCNC: 3.3 G/DL (ref 3.4–5)
ALBUMIN/GLOB SERPL: 0.8 {RATIO} (ref 1–2)
ALP LIVER SERPL-CCNC: 228 U/L (ref 45–117)
ALT SERPL-CCNC: 38 U/L (ref 16–61)
ANION GAP SERPL CALC-SCNC: 7 MMOL/L (ref 0–18)
APTT PPP: 148.7 SECONDS (ref 26.1–34.6)
APTT PPP: 189.7 SECONDS (ref 26.1–34.6)
APTT PPP: 39.7 SECONDS (ref 26.1–34.6)
AST SERPL-CCNC: 33 U/L (ref 15–37)
BASOPHILS # BLD AUTO: 0.03 X10(3) UL (ref 0–0.2)
BASOPHILS NFR BLD AUTO: 0.3 %
BILIRUB SERPL-MCNC: 0.8 MG/DL (ref 0.1–2)
BUN BLD-MCNC: 13 MG/DL (ref 7–18)
BUN/CREAT SERPL: 16.7 (ref 10–20)
CALCIUM BLD-MCNC: 8.3 MG/DL (ref 8.5–10.1)
CANCER AG19-9 SERPL-ACNC: ABNORMAL U/ML (ref ?–37)
CEA SERPL-MCNC: 586.5 NG/ML (ref ?–5)
CHLORIDE SERPL-SCNC: 109 MMOL/L (ref 98–107)
CO2 SERPL-SCNC: 27 MMOL/L (ref 21–32)
CREAT BLD-MCNC: 0.78 MG/DL (ref 0.7–1.3)
DEPRECATED RDW RBC AUTO: 43.8 FL (ref 35.1–46.3)
EOSINOPHIL # BLD AUTO: 0.33 X10(3) UL (ref 0–0.7)
EOSINOPHIL NFR BLD AUTO: 3.4 %
ERYTHROCYTE [DISTWIDTH] IN BLOOD BY AUTOMATED COUNT: 13.1 % (ref 11–15)
EST. AVERAGE GLUCOSE BLD GHB EST-MCNC: 146 MG/DL (ref 68–126)
GLOBULIN PLAS-MCNC: 4 G/DL (ref 2.8–4.4)
GLUCOSE BLD-MCNC: 110 MG/DL (ref 70–99)
GLUCOSE BLD-MCNC: 144 MG/DL (ref 70–99)
HBA1C MFR BLD HPLC: 6.7 % (ref ?–5.7)
HCT VFR BLD AUTO: 40.9 % (ref 39–53)
HGB BLD-MCNC: 13.3 G/DL (ref 13–17.5)
IMM GRANULOCYTES # BLD AUTO: 0.03 X10(3) UL (ref 0–1)
IMM GRANULOCYTES NFR BLD: 0.3 %
INR BLD: 1.28 (ref 0.9–1.1)
LDH SERPL L TO P-CCNC: 265 U/L (ref 87–241)
LYMPHOCYTES # BLD AUTO: 2.02 X10(3) UL (ref 1–4)
LYMPHOCYTES NFR BLD AUTO: 20.8 %
M PROTEIN MFR SERPL ELPH: 7.3 G/DL (ref 6.4–8.2)
MCH RBC QN AUTO: 29.9 PG (ref 26–34)
MCHC RBC AUTO-ENTMCNC: 32.5 G/DL (ref 31–37)
MCV RBC AUTO: 91.9 FL (ref 80–100)
MONOCYTES # BLD AUTO: 1.19 X10(3) UL (ref 0.1–1)
MONOCYTES NFR BLD AUTO: 12.3 %
NEUTROPHILS # BLD AUTO: 6.09 X10 (3) UL (ref 1.5–7.7)
NEUTROPHILS # BLD AUTO: 6.09 X10(3) UL (ref 1.5–7.7)
NEUTROPHILS NFR BLD AUTO: 62.9 %
OSMOLALITY SERPL CALC.SUM OF ELEC: 297 MOSM/KG (ref 275–295)
PLATELET # BLD AUTO: 133 10(3)UL (ref 150–450)
POTASSIUM SERPL-SCNC: 3.7 MMOL/L (ref 3.5–5.1)
PSA SERPL DL<=0.01 NG/ML-MCNC: 16.5 SECONDS (ref 12.4–14.7)
RBC # BLD AUTO: 4.45 X10(6)UL (ref 3.8–5.8)
SODIUM SERPL-SCNC: 143 MMOL/L (ref 136–145)
WBC # BLD AUTO: 9.7 X10(3) UL (ref 4–11)

## 2019-02-20 PROCEDURE — 99233 SBSQ HOSP IP/OBS HIGH 50: CPT | Performed by: HOSPITALIST

## 2019-02-20 PROCEDURE — 99223 1ST HOSP IP/OBS HIGH 75: CPT | Performed by: INTERNAL MEDICINE

## 2019-02-20 PROCEDURE — 93306 TTE W/DOPPLER COMPLETE: CPT | Performed by: HOSPITALIST

## 2019-02-20 PROCEDURE — 74177 CT ABD & PELVIS W/CONTRAST: CPT | Performed by: HOSPITALIST

## 2019-02-20 RX ORDER — POTASSIUM CHLORIDE 20 MEQ/1
40 TABLET, EXTENDED RELEASE ORAL ONCE
Status: COMPLETED | OUTPATIENT
Start: 2019-02-20 | End: 2019-02-20

## 2019-02-20 NOTE — IMAGING NOTE
Suha Boo RN notified Pt to get the CT scan done today. And prep for biopsy tomorrow. Tentative time 1130 AM. NPO for hrs.  PT/INR in AM. Hold heparin for 4 Hrs prior to the procedure

## 2019-02-20 NOTE — CONSULTS
[facility]  Report of Consultation    Kishore Gee Patient Status:  Inpatient    1950 MRN [id number]   [facility] 7NE-A Attending Hany Meier MD   Hosp Day # 1 PCP Silvia Ledezma MD     Reason for Consultation:    Bilateral PE 91.9 kg (202 lb 8 oz), SpO2 95 %. General: No acute distress. Alert and oriented x 3. Wife at bedside. HEENT: Moist mucous membranes. EOM-I. PERRL  Neck: No lymphadenopathy. No JVD. Respiratory: Clear to auscultation bilaterally. No wheezes.  No rhon

## 2019-02-20 NOTE — H&P
[facility]  History and Physical     Joe Mckinley Patient Status:  Inpatient    1950 MRN [id number]   [facility] 7NE-A Attending Cristhian Lin MD   Hosp Day # 0 PCP Latisha Alfaro MD     Chief Complaint: LLE pain     Hist Disp: 90 tablet Rfl: 3   Azelastine HCl 0.05 % Ophthalmic Solution Place 1 drop into both eyes 2 (two) times daily. Disp: 6 mL Rfl: 3   aspirin 81 MG Oral Tab Take 1 tablet (81 mg total) by mouth daily.  Disp:  Rfl: 0       Review of Systems:   A comprehens

## 2019-02-20 NOTE — PROGRESS NOTES
[facility]  Progress note     Shannan Astridil Patient Status:  Inpatient    1950 MRN [id number]   [facility] 7NE-A Attending Silvia Gardner MD   Hosp Day # 1 PCP Ira Mabry MD     Chief Complaint: LLE pain     Subjective:

## 2019-02-20 NOTE — PLAN OF CARE
HEMATOLOGIC - ADULT    • Maintains hematologic stability Progressing    • Free from bleeding injury Progressing        Patient/Family Goals    • Patient/Family Long Term Goal Progressing    • Patient/Family Short Term Goal Progressing        RESPIRATORY -

## 2019-02-20 NOTE — PLAN OF CARE
Assumed care @0283. A&Ox4, RA diminished. NSR. Heparin drip decreased to 15.5ml/hr with PTT of 189.7. Next PTT @ 2230. Denies pain. Bedrest.  Echo results 60%. CT abd with contrast today shows mets. Earl Denton Ultrasound liver with biopsy tomorrow @ 1130.   A

## 2019-02-21 ENCOUNTER — APPOINTMENT (OUTPATIENT)
Dept: ULTRASOUND IMAGING | Facility: HOSPITAL | Age: 69
DRG: 435 | End: 2019-02-21
Attending: INTERNAL MEDICINE
Payer: MEDICARE

## 2019-02-21 ENCOUNTER — APPOINTMENT (OUTPATIENT)
Dept: CT IMAGING | Facility: HOSPITAL | Age: 69
DRG: 435 | End: 2019-02-21
Attending: INTERNAL MEDICINE
Payer: MEDICARE

## 2019-02-21 LAB
APTT PPP: 57.7 SECONDS (ref 26.1–34.6)
GLUCOSE BLD-MCNC: 90 MG/DL (ref 70–99)
INR BLD: 1.16 (ref 0.9–1.1)
PLATELET # BLD AUTO: 156 10(3)UL (ref 150–450)
POTASSIUM SERPL-SCNC: 4 MMOL/L (ref 3.5–5.1)
PSA SERPL DL<=0.01 NG/ML-MCNC: 14.6 SECONDS (ref 12–14.1)

## 2019-02-21 PROCEDURE — 0FB13ZX EXCISION OF RIGHT LOBE LIVER, PERCUTANEOUS APPROACH, DIAGNOSTIC: ICD-10-PCS | Performed by: RADIOLOGY

## 2019-02-21 PROCEDURE — 99153 MOD SED SAME PHYS/QHP EA: CPT | Performed by: INTERNAL MEDICINE

## 2019-02-21 PROCEDURE — 76942 ECHO GUIDE FOR BIOPSY: CPT | Performed by: INTERNAL MEDICINE

## 2019-02-21 PROCEDURE — 99152 MOD SED SAME PHYS/QHP 5/>YRS: CPT | Performed by: INTERNAL MEDICINE

## 2019-02-21 PROCEDURE — 99232 SBSQ HOSP IP/OBS MODERATE 35: CPT | Performed by: HOSPITALIST

## 2019-02-21 PROCEDURE — 47000 NEEDLE BIOPSY OF LIVER PERQ: CPT | Performed by: INTERNAL MEDICINE

## 2019-02-21 PROCEDURE — 77012 CT SCAN FOR NEEDLE BIOPSY: CPT | Performed by: INTERNAL MEDICINE

## 2019-02-21 RX ORDER — MIDAZOLAM HYDROCHLORIDE 1 MG/ML
INJECTION INTRAMUSCULAR; INTRAVENOUS
Status: COMPLETED
Start: 2019-02-21 | End: 2019-02-21

## 2019-02-21 RX ORDER — MIDAZOLAM HYDROCHLORIDE 1 MG/ML
1 INJECTION INTRAMUSCULAR; INTRAVENOUS EVERY 5 MIN PRN
Status: DISCONTINUED | OUTPATIENT
Start: 2019-02-21 | End: 2019-02-21 | Stop reason: HOSPADM

## 2019-02-21 RX ORDER — NALOXONE HYDROCHLORIDE 0.4 MG/ML
80 INJECTION, SOLUTION INTRAMUSCULAR; INTRAVENOUS; SUBCUTANEOUS AS NEEDED
Status: DISCONTINUED | OUTPATIENT
Start: 2019-02-21 | End: 2019-02-21 | Stop reason: HOSPADM

## 2019-02-21 RX ORDER — FLUMAZENIL 0.1 MG/ML
INJECTION, SOLUTION INTRAVENOUS
Status: DISCONTINUED
Start: 2019-02-21 | End: 2019-02-21 | Stop reason: WASHOUT

## 2019-02-21 RX ORDER — NALOXONE HYDROCHLORIDE 0.4 MG/ML
INJECTION, SOLUTION INTRAMUSCULAR; INTRAVENOUS; SUBCUTANEOUS
Status: DISCONTINUED
Start: 2019-02-21 | End: 2019-02-21 | Stop reason: WASHOUT

## 2019-02-21 RX ORDER — SODIUM CHLORIDE 9 MG/ML
INJECTION, SOLUTION INTRAVENOUS CONTINUOUS
Status: DISCONTINUED | OUTPATIENT
Start: 2019-02-21 | End: 2019-02-21 | Stop reason: HOSPADM

## 2019-02-21 RX ORDER — FLUMAZENIL 0.1 MG/ML
0.2 INJECTION, SOLUTION INTRAVENOUS AS NEEDED
Status: DISCONTINUED | OUTPATIENT
Start: 2019-02-21 | End: 2019-02-21 | Stop reason: HOSPADM

## 2019-02-21 NOTE — IMAGING NOTE
Phoned report to [facility]. Pt has 2 tegaderms one to mid upper abd w small amount blood under tegaderm the other to r rib cage area c,d, I. Pt awake following instructions, states no pain. Pt transferred to room 7621, pt is hungry let staff know.

## 2019-02-21 NOTE — PROCEDURES
US could only visualize large seg 8 lesion. Attempted bx aborted as the lesion could be not be seen consistently after sedation. Transfer to CT. More inf rt lobe lesion sampled, 18G core x 5. Comp-none.

## 2019-02-21 NOTE — IMAGING NOTE
Dr SANTAMARIA explained procedure to patient through help from [address] [place] [address]  line. Patient signed consent. Time out performed. Dr Syeda Anderson decided to have procedure finalized from ultrasound department to CT scan.  Patient made aware and provided opportunities to ad

## 2019-02-21 NOTE — PLAN OF CARE
Assumed care @ 1900. AOx4, NSR per tele, RA, VSS. Denies pain/discomfort. Hep gtt infusing as ordered. .7. Hep gtt held for 60 min then decreased to 13 ml/hr per protocol. Call light within reach, needs attended to. Will continue to monitor.

## 2019-02-21 NOTE — PAYOR COMM NOTE
--------------  ADMISSION REVIEW     Payor: Pepe Diallo #:  [id number]  Authorization Number: [id number]    Admit date: 2/19/19  Admit time: 2243       Admitting Physician: Kristeen Hashimoto, MD  Attending Physician:  Franck Curiel MD (?  cyst)     Family History:   Family History   Problem Relation Age of Onset   • Other (Other[other]) Brother         in a wheel chair for back problems     Allergies: No Known Allergies    Medications:    No current facility-administered medications on f initiating oral anticoagulant? 4. Oncology consult  7. Prediabetes  1. Check A1c  8. Dyslipidemia  1. Lipitor  Quality:  · DVT Prophylaxis: As above  Plan of care discussed with patient, family and RN.    Vishal Cooper MD  2/19/2019    Report of Consultat colon polyps, pathology 2017: fragmented tubular adenooma  7. Prediabetes  1. Check A1c  8. Dyslipidemia  1.  Lipitor  Heparin drip for now; heme/onc consult; CT abd/pelvis this afternoon; will likely need biopsy to determine if and where malignancy is comi ADMISSION    PLEASE FAX ALL INPT DAYS AS AUTHORIZED ALONG W/NRD

## 2019-02-21 NOTE — PROGRESS NOTES
[facility]  Progress note     Lise Jalloh Patient Status:  Inpatient    1950 MRN [id number]   [facility] 7NE-A Attending Victor Hugo Enriquez MD   Hosp Day # 2 PCP Amy Rao MD     Chief Complaint: LLE pain     Subjective:

## 2019-02-21 NOTE — CM/SW NOTE
Call placed to patient's [facility] [phone] as script for eliquis sent here--cost $10.96

## 2019-02-21 NOTE — PLAN OF CARE
Assumed care at 0730  A&Ox4, VSS, NSR on tele  LLE pedal pulses palpable. Pt denies pain  Liver biopsy completed. Midline abdominal dressing with scant SS drainage. Right dressing C/D/I  Tolerating general diet  Resumed heparin at 1730 per radiology.  Next

## 2019-02-22 VITALS
OXYGEN SATURATION: 94 % | BODY MASS INDEX: 30 KG/M2 | TEMPERATURE: 98 F | SYSTOLIC BLOOD PRESSURE: 129 MMHG | RESPIRATION RATE: 18 BRPM | DIASTOLIC BLOOD PRESSURE: 73 MMHG | HEART RATE: 66 BPM | WEIGHT: 202.5 LBS

## 2019-02-22 LAB
APTT PPP: 72.3 SECONDS (ref 26.1–34.6)
PLATELET # BLD AUTO: 178 10(3)UL (ref 150–450)

## 2019-02-22 PROCEDURE — 99233 SBSQ HOSP IP/OBS HIGH 50: CPT | Performed by: INTERNAL MEDICINE

## 2019-02-22 PROCEDURE — 99239 HOSP IP/OBS DSCHRG MGMT >30: CPT | Performed by: HOSPITALIST

## 2019-02-22 RX ORDER — HYDROCODONE BITARTRATE AND ACETAMINOPHEN 5; 325 MG/1; MG/1
1 TABLET ORAL EVERY 4 HOURS PRN
Qty: 6 TABLET | Refills: 0 | Status: SHIPPED | OUTPATIENT
Start: 2019-02-22 | End: 2019-03-08

## 2019-02-22 RX ORDER — TRAMADOL HYDROCHLORIDE 50 MG/1
50 TABLET ORAL EVERY 6 HOURS PRN
Status: DISCONTINUED | OUTPATIENT
Start: 2019-02-22 | End: 2019-02-22

## 2019-02-22 NOTE — PLAN OF CARE
NURSING DISCHARGE NOTE    Discharged Home via Wheelchair. Accompanied by Family member  Belongings Taken by patient/family.   PIV and tele dc'd  Heme/onc APN at bedside to review case and address questions with family  Medication changes, follow up vladimir

## 2019-02-22 NOTE — PROGRESS NOTES
Subjective: sore in right arm and shoulder and RUQ  Objective: NAD, lungs clear; RRR; mild tenderness RUQ   02/22/19  0418   BP: 125/77   Pulse: 64   Resp: 18   Temp: 98.4 °F (36.9 °C)     A/p:  Dvt/pe, suspected metastatic pancreatic ca; biopsies pending;

## 2019-02-22 NOTE — PROGRESS NOTES
[facility]  Progress Note    Joann Mak Patient Status:  Inpatient    1950 MRN [id number]   [facility] 7NE-A Attending Heber Osorio MD   Hosp Day # 3 PCP Ubaldo Hastings MD       SUBJECTIVE:    C/o R shoulder and RUQ pain, dif picture c/w met panc ca, await final path. Bx done. F/u scheduled for next Fri. Have final path by then. OK to d/c from onc perspective when ok with others. Loren Foreman M.D.     [facility]  [address]  [place], [address]      2/2

## 2019-02-22 NOTE — PLAN OF CARE
Assumed care @ 1900. AOx4, NSR on tele, RA, VSS. C/O R shoulder pain and abdominal pain. Given heat pack. Given tylenol and ultram ordered with relief. LLE pulses palpable. Mid abd incision d/i with old ss drainage.  R abd incision c/d/i with no drainag

## 2019-02-23 NOTE — DISCHARGE SUMMARY
[facility]  DISCHARGE SUMMARY     Anuradhaus Rota Patient Status:  Inpatient    1950 MRN [id number]   [facility] 7NE-A Attending No att. providers found   Hosp Day # 3 PCP Colton Pendleton MD     Date of Admission: 2019  Date o hospitalization:   • Liver lesion biopsy    Incidental or significant findings and recommendations (brief descriptions):  • See above    Lab/Test results pending at Discharge:   · Liver lesion biopsy    Consultants:  • Dr. Pratik Santamaria Next 30 Days 2/22/2019 - 3/24/2019      Date Arrival Time Visit Type Length Department Provider     3/1/2019 11:30 AM  POST-HOSPITAL FOLLOW-UP [2640] 30 min. [facility] in [facility], MD    Location Instructions:       General Kapil

## 2019-02-25 ENCOUNTER — PATIENT OUTREACH (OUTPATIENT)
Dept: CASE MANAGEMENT | Age: 69
End: 2019-02-25

## 2019-02-25 ENCOUNTER — OFFICE VISIT (OUTPATIENT)
Dept: FAMILY MEDICINE CLINIC | Facility: CLINIC | Age: 69
End: 2019-02-25
Payer: MEDICARE

## 2019-02-25 VITALS
RESPIRATION RATE: 18 BRPM | WEIGHT: 193 LBS | HEART RATE: 68 BPM | BODY MASS INDEX: 28.58 KG/M2 | HEIGHT: 69 IN | SYSTOLIC BLOOD PRESSURE: 116 MMHG | DIASTOLIC BLOOD PRESSURE: 72 MMHG | TEMPERATURE: 97 F

## 2019-02-25 DIAGNOSIS — R91.1 PULMONARY NODULE: ICD-10-CM

## 2019-02-25 DIAGNOSIS — I26.99 ACUTE PULMONARY EMBOLISM WITHOUT ACUTE COR PULMONALE, UNSPECIFIED PULMONARY EMBOLISM TYPE (HCC): ICD-10-CM

## 2019-02-25 DIAGNOSIS — Z02.9 ENCOUNTERS FOR UNSPECIFIED ADMINISTRATIVE PURPOSE: ICD-10-CM

## 2019-02-25 DIAGNOSIS — I82.4Z2 ACUTE DEEP VEIN THROMBOSIS (DVT) OF DISTAL VEIN OF LEFT LOWER EXTREMITY (HCC): ICD-10-CM

## 2019-02-25 DIAGNOSIS — Z09 HOSPITAL DISCHARGE FOLLOW-UP: Primary | ICD-10-CM

## 2019-02-25 DIAGNOSIS — D69.6 THROMBOCYTOPENIA (HCC): ICD-10-CM

## 2019-02-25 DIAGNOSIS — R16.0 LIVER MASS: ICD-10-CM

## 2019-02-25 DIAGNOSIS — K86.89 PANCREATIC MASS: ICD-10-CM

## 2019-02-25 PROCEDURE — 99495 TRANSJ CARE MGMT MOD F2F 14D: CPT | Performed by: FAMILY MEDICINE

## 2019-02-25 PROCEDURE — 1111F DSCHRG MED/CURRENT MED MERGE: CPT

## 2019-02-25 PROCEDURE — 1111F DSCHRG MED/CURRENT MED MERGE: CPT | Performed by: FAMILY MEDICINE

## 2019-02-25 NOTE — PROGRESS NOTES
Patient requests that [facility] call his daughter Favio Rodgers placed a call to Clari Vega, spoke with patient and his daughter Clari Vega.     Initial Post Discharge Follow Up   Discharge Date: 2/22/19  Contact Date: 2/25/2019    Consent Verification:  Assessment Complet instructions? No  • Before leaving the hospital was your diagnoses explained to you? Yes  • Do you have any questions about your diagnoses? No  • Are you able to perform normal daily activities of living as you have prior to your hospital stay?  yes  • Were [facility])        [address] [facility]  [address] [facility] [address] [facility] [address] [address] [address], [place], [address] [facility]

## 2019-02-27 NOTE — PROGRESS NOTES
[facility] Progress Note      Patient Name:  Tho Garcia  YOB: 1950  Medical Record Number:  [id number]    Date of visit:  3/1/2019    CHIEF COMPLAINT: Metastatic pancreatic carcinoma, daphne PE, L leg DVT.     HPI:     76year old t EVERY NIGHT AT BEDTIME Disp: 90 tablet Rfl: 3       VITALS:  Height: 175.3 cm (5' 9\") (03/01 1135)  Weight: 87.7 kg (193 lb 6.4 oz) (03/01 1135)  BSA (Calculated - sq m): 2.04 sq meters (03/01 1135)  Pulse: 70 (03/01 1135)  BP: 149/64 (03/01 1135)  Temp: about complimentary/holistic treatments and they may seek an opinion for that elsewhere. Discussed that will restage after 4-6 cycles to assess response and will also follow tumor markers.     # L leg DVT/daphne PE: jonel provoked due to bus trip to [facility].

## 2019-02-28 ENCOUNTER — MED REC SCAN ONLY (OUTPATIENT)
Dept: FAMILY MEDICINE CLINIC | Facility: CLINIC | Age: 69
End: 2019-02-28

## 2019-02-28 PROBLEM — D69.6 THROMBOCYTOPENIA (HCC): Chronic | Status: RESOLVED | Noted: 2019-02-25 | Resolved: 2019-02-28

## 2019-03-01 ENCOUNTER — OFFICE VISIT (OUTPATIENT)
Dept: HEMATOLOGY/ONCOLOGY | Facility: HOSPITAL | Age: 69
End: 2019-03-01
Attending: INTERNAL MEDICINE
Payer: MEDICARE

## 2019-03-01 VITALS
RESPIRATION RATE: 18 BRPM | BODY MASS INDEX: 28.64 KG/M2 | HEIGHT: 69 IN | OXYGEN SATURATION: 99 % | SYSTOLIC BLOOD PRESSURE: 149 MMHG | WEIGHT: 193.38 LBS | TEMPERATURE: 98 F | HEART RATE: 70 BPM | DIASTOLIC BLOOD PRESSURE: 64 MMHG

## 2019-03-01 VITALS
WEIGHT: 200 LBS | DIASTOLIC BLOOD PRESSURE: 70 MMHG | HEART RATE: 64 BPM | BODY MASS INDEX: 28.63 KG/M2 | HEIGHT: 70 IN | SYSTOLIC BLOOD PRESSURE: 128 MMHG

## 2019-03-01 VITALS — HEIGHT: 69 IN | BODY MASS INDEX: 28.58 KG/M2 | WEIGHT: 193 LBS

## 2019-03-01 DIAGNOSIS — C25.9 PANCREATIC CARCINOMA (HCC): Primary | ICD-10-CM

## 2019-03-01 DIAGNOSIS — C78.00 MALIGNANT NEOPLASM METASTATIC TO LUNG, UNSPECIFIED LATERALITY (HCC): ICD-10-CM

## 2019-03-01 DIAGNOSIS — C78.7 LIVER METASTASES (HCC): ICD-10-CM

## 2019-03-01 PROCEDURE — 99215 OFFICE O/P EST HI 40 MIN: CPT | Performed by: INTERNAL MEDICINE

## 2019-03-01 NOTE — PROGRESS NOTES
Spoke with IR for port placement order. They will try for March 7th. Notifed pt is on eliquis. Daughter Kera Wilson contact information given to IR to call and radhika port.

## 2019-03-01 NOTE — PROGRESS NOTES
Pt here for MD f/up to discuss path with family. Taking eliquis as instructed. Denies bleeding/bruising. Used  line with  [id number].

## 2019-03-05 ENCOUNTER — OFFICE VISIT (OUTPATIENT)
Dept: HEMATOLOGY/ONCOLOGY | Facility: HOSPITAL | Age: 69
End: 2019-03-05
Attending: INTERNAL MEDICINE
Payer: MEDICARE

## 2019-03-05 DIAGNOSIS — C78.7 LIVER METASTASES (HCC): ICD-10-CM

## 2019-03-05 DIAGNOSIS — Z71.89 OTHER SPECIFIED COUNSELING: ICD-10-CM

## 2019-03-05 DIAGNOSIS — C78.00 MALIGNANT NEOPLASM METASTATIC TO LUNG, UNSPECIFIED LATERALITY (HCC): ICD-10-CM

## 2019-03-05 DIAGNOSIS — C25.9 PANCREATIC CARCINOMA (HCC): Primary | ICD-10-CM

## 2019-03-05 PROCEDURE — 99215 OFFICE O/P EST HI 40 MIN: CPT | Performed by: CLINICAL NURSE SPECIALIST

## 2019-03-05 RX ORDER — PROCHLORPERAZINE MALEATE 10 MG
10 TABLET ORAL EVERY 6 HOURS PRN
Qty: 30 TABLET | Refills: 3 | Status: SHIPPED | OUTPATIENT
Start: 2019-03-05 | End: 2020-01-01

## 2019-03-05 RX ORDER — ONDANSETRON HYDROCHLORIDE 8 MG/1
8 TABLET, FILM COATED ORAL EVERY 8 HOURS PRN
Qty: 30 TABLET | Refills: 3 | Status: SHIPPED | OUTPATIENT
Start: 2019-03-05 | End: 2020-01-01

## 2019-03-05 NOTE — PROGRESS NOTES
IV Chemotherapy Education    Patient:Nnamdi Kumari     Date: 3/5/19   Diagnosis:  Metastatic Pancreatic Ca   Caregivers present: family members    Drug names:  5FU, Leucovorin, Oxaliplatin, Irinotecan    Treatment Effects on Bone Marrow:  Chemotherapy act counseling patient regarding the above documented side effects and management, when to call provider and contact information.      He AN  Nurse Practitioner  [facility] [address] [place] [address]

## 2019-03-07 ENCOUNTER — TELEPHONE (OUTPATIENT)
Dept: HEMATOLOGY/ONCOLOGY | Facility: HOSPITAL | Age: 69
End: 2019-03-07

## 2019-03-07 ENCOUNTER — HOSPITAL ENCOUNTER (OUTPATIENT)
Dept: INTERVENTIONAL RADIOLOGY/VASCULAR | Facility: HOSPITAL | Age: 69
Discharge: HOME | End: 2019-03-07
Attending: INTERNAL MEDICINE
Payer: MEDICARE

## 2019-03-07 NOTE — TELEPHONE ENCOUNTER
Marlyn Balbuena MD  P [facility]w [address] Rns   Caller: Unspecified (Today, 10:00 AM)             Pain is due to cancer. If not controlled, should go to er      Called spoke with daughter with above. She verbalized understanding.  Daughter worried about amount

## 2019-03-07 NOTE — TELEPHONE ENCOUNTER
[facility], RN   Caller: Unspecified (Today, 12:35 PM)             The chemo drugs do not require PA-the Emend and the Fulphila do.  I sent PA requests to [facility] yesterday. Galo Oconnell got the Emend approval this am-still waiting on the Fulphila.

## 2019-03-07 NOTE — TELEPHONE ENCOUNTER
Daughter called [facility] that pt had stomach pain last night he took 1 norco with no relief. 3 hours later pt took 2 norco and was able to sleep. Daughter wanting to ask MD why he is having this stomach pain?  He did not have it the night before but has some 2 d

## 2019-03-07 NOTE — TELEPHONE ENCOUNTER
Daughter called that pt has 6 pills of norco left. Asking for new prescription to last him till chemo starts. Asked daughter if pt has enough to get him to tomorrow as MD is off today. Pt has not needed any pills today only at night.  Explained to daughter

## 2019-03-08 NOTE — TELEPHONE ENCOUNTER
Spoke to DTR. Let her know Lashawn De Souza script is at [facility] location. Also, connected to PSR to set up MD VIVIANE, Chemo next week. Pt having port placed on Monday 3/11.

## 2019-03-11 ENCOUNTER — NURSE ONLY (OUTPATIENT)
Dept: HEMATOLOGY/ONCOLOGY | Facility: HOSPITAL | Age: 69
End: 2019-03-11

## 2019-03-11 ENCOUNTER — HOSPITAL ENCOUNTER (OUTPATIENT)
Dept: INTERVENTIONAL RADIOLOGY/VASCULAR | Facility: HOSPITAL | Age: 69
Discharge: HOME | End: 2019-03-11
Attending: INTERNAL MEDICINE | Admitting: INTERNAL MEDICINE
Payer: MEDICARE

## 2019-03-11 VITALS
RESPIRATION RATE: 22 BRPM | OXYGEN SATURATION: 96 % | HEART RATE: 65 BPM | SYSTOLIC BLOOD PRESSURE: 100 MMHG | TEMPERATURE: 98 F | DIASTOLIC BLOOD PRESSURE: 49 MMHG

## 2019-03-11 DIAGNOSIS — C25.9 PANCREATIC CARCINOMA (HCC): ICD-10-CM

## 2019-03-11 DIAGNOSIS — C78.7 LIVER METASTASES (HCC): ICD-10-CM

## 2019-03-11 DIAGNOSIS — C78.00 MALIGNANT NEOPLASM METASTATIC TO LUNG, UNSPECIFIED LATERALITY (HCC): ICD-10-CM

## 2019-03-11 PROCEDURE — 77001 FLUOROGUIDE FOR VEIN DEVICE: CPT

## 2019-03-11 PROCEDURE — 99152 MOD SED SAME PHYS/QHP 5/>YRS: CPT

## 2019-03-11 PROCEDURE — 02HV33Z INSERTION OF INFUSION DEVICE INTO SUPERIOR VENA CAVA, PERCUTANEOUS APPROACH: ICD-10-PCS | Performed by: RADIOLOGY

## 2019-03-11 PROCEDURE — 36561 INSERT TUNNELED CV CATH: CPT

## 2019-03-11 PROCEDURE — 0JH60WZ INSERTION OF TOTALLY IMPLANTABLE VASCULAR ACCESS DEVICE INTO CHEST SUBCUTANEOUS TISSUE AND FASCIA, OPEN APPROACH: ICD-10-PCS | Performed by: RADIOLOGY

## 2019-03-11 PROCEDURE — 99153 MOD SED SAME PHYS/QHP EA: CPT

## 2019-03-11 PROCEDURE — 76937 US GUIDE VASCULAR ACCESS: CPT

## 2019-03-11 RX ORDER — HEPARIN SODIUM 5000 [USP'U]/ML
INJECTION, SOLUTION INTRAVENOUS; SUBCUTANEOUS
Status: COMPLETED
Start: 2019-03-11 | End: 2019-03-11

## 2019-03-11 RX ORDER — SODIUM CHLORIDE 9 MG/ML
INJECTION, SOLUTION INTRAVENOUS CONTINUOUS
Status: DISCONTINUED | OUTPATIENT
Start: 2019-03-11 | End: 2019-03-11

## 2019-03-11 RX ORDER — LIDOCAINE HYDROCHLORIDE 10 MG/ML
INJECTION, SOLUTION INFILTRATION; PERINEURAL
Status: COMPLETED
Start: 2019-03-11 | End: 2019-03-11

## 2019-03-11 RX ORDER — MIDAZOLAM HYDROCHLORIDE 1 MG/ML
INJECTION INTRAMUSCULAR; INTRAVENOUS
Status: COMPLETED
Start: 2019-03-11 | End: 2019-03-11

## 2019-03-11 RX ORDER — CEFAZOLIN SODIUM/WATER 2 G/20 ML
SYRINGE (ML) INTRAVENOUS
Status: COMPLETED
Start: 2019-03-11 | End: 2019-03-11

## 2019-03-11 RX ADMIN — SODIUM CHLORIDE: 9 INJECTION, SOLUTION INTRAVENOUS at 09:15:00

## 2019-03-11 NOTE — H&P
[facility]   History & Physical    [facility]:  [facility]    1950 MRN [id number]   Location [address] [facility] Attending Junie Nolasco MD   Hosp Day # 0 PCP Sayra Cardoso MD      [place] [address] patient achieving goals; and the potential problems that might occur during recuperation.   I discussed reasonable alternatives to the procedure, including risks, benefits and side effects related to the alternatives, and risks related to not receiving this

## 2019-03-11 NOTE — PROCEDURES
[facility]  Procedure Note    Giselle Chahal Patient Status:  Outpatient in a Bed    1950 MRN [id number]   Location [address] [facility] Attending Gilmer Montano MD   Hosp Day # 0 PCP Anne-Marie Davila MD     Procedure: [facility]

## 2019-03-11 NOTE — PROGRESS NOTES
Discharge instructions explained to the patient and his daughter in law, vitals are stable, iv removed, port implant area is clean and dry, patient was taken in a wheelchair to the car and went home in stable condition with family.

## 2019-03-12 ENCOUNTER — OFFICE VISIT (OUTPATIENT)
Dept: HEMATOLOGY/ONCOLOGY | Facility: HOSPITAL | Age: 69
End: 2019-03-12
Attending: INTERNAL MEDICINE
Payer: MEDICARE

## 2019-03-12 VITALS
WEIGHT: 191.63 LBS | OXYGEN SATURATION: 97 % | TEMPERATURE: 99 F | HEIGHT: 68.5 IN | DIASTOLIC BLOOD PRESSURE: 74 MMHG | BODY MASS INDEX: 28.71 KG/M2 | HEART RATE: 66 BPM | SYSTOLIC BLOOD PRESSURE: 148 MMHG | RESPIRATION RATE: 16 BRPM

## 2019-03-12 DIAGNOSIS — C78.7 LIVER METASTASES (HCC): ICD-10-CM

## 2019-03-12 DIAGNOSIS — C78.00 MALIGNANT NEOPLASM METASTATIC TO LUNG, UNSPECIFIED LATERALITY (HCC): ICD-10-CM

## 2019-03-12 DIAGNOSIS — I82.412 ACUTE DEEP VEIN THROMBOSIS (DVT) OF FEMORAL VEIN OF LEFT LOWER EXTREMITY (HCC): ICD-10-CM

## 2019-03-12 DIAGNOSIS — I26.99 BILATERAL PULMONARY EMBOLISM (HCC): ICD-10-CM

## 2019-03-12 DIAGNOSIS — C25.9 PANCREATIC CARCINOMA (HCC): Primary | ICD-10-CM

## 2019-03-12 LAB
ALBUMIN SERPL-MCNC: 3.1 G/DL (ref 3.4–5)
ALBUMIN/GLOB SERPL: 0.7 {RATIO} (ref 1–2)
ALP LIVER SERPL-CCNC: 600 U/L (ref 45–117)
ALT SERPL-CCNC: 63 U/L (ref 16–61)
ANION GAP SERPL CALC-SCNC: 8 MMOL/L (ref 0–18)
AST SERPL-CCNC: 53 U/L (ref 15–37)
BASOPHILS # BLD AUTO: 0.02 X10(3) UL (ref 0–0.2)
BASOPHILS NFR BLD AUTO: 0.2 %
BILIRUB SERPL-MCNC: 0.7 MG/DL (ref 0.1–2)
BUN BLD-MCNC: 14 MG/DL (ref 7–18)
BUN/CREAT SERPL: 19.2 (ref 10–20)
CALCIUM BLD-MCNC: 9 MG/DL (ref 8.5–10.1)
CHLORIDE SERPL-SCNC: 104 MMOL/L (ref 98–107)
CO2 SERPL-SCNC: 25 MMOL/L (ref 21–32)
CREAT BLD-MCNC: 0.73 MG/DL (ref 0.7–1.3)
DEPRECATED RDW RBC AUTO: 43.1 FL (ref 35.1–46.3)
EOSINOPHIL # BLD AUTO: 0.31 X10(3) UL (ref 0–0.7)
EOSINOPHIL NFR BLD AUTO: 3.1 %
ERYTHROCYTE [DISTWIDTH] IN BLOOD BY AUTOMATED COUNT: 13.3 % (ref 11–15)
GLOBULIN PLAS-MCNC: 4.3 G/DL (ref 2.8–4.4)
GLUCOSE BLD-MCNC: 130 MG/DL (ref 70–99)
HCT VFR BLD AUTO: 37.8 % (ref 39–53)
HGB BLD-MCNC: 12.5 G/DL (ref 13–17.5)
IMM GRANULOCYTES # BLD AUTO: 0.04 X10(3) UL (ref 0–1)
IMM GRANULOCYTES NFR BLD: 0.4 %
LYMPHOCYTES # BLD AUTO: 1.26 X10(3) UL (ref 1–4)
LYMPHOCYTES NFR BLD AUTO: 12.6 %
M PROTEIN MFR SERPL ELPH: 7.4 G/DL (ref 6.4–8.2)
MCH RBC QN AUTO: 29.6 PG (ref 26–34)
MCHC RBC AUTO-ENTMCNC: 33.1 G/DL (ref 31–37)
MCV RBC AUTO: 89.4 FL (ref 80–100)
MONOCYTES # BLD AUTO: 0.93 X10(3) UL (ref 0.1–1)
MONOCYTES NFR BLD AUTO: 9.3 %
NEUTROPHILS # BLD AUTO: 7.46 X10 (3) UL (ref 1.5–7.7)
NEUTROPHILS # BLD AUTO: 7.46 X10(3) UL (ref 1.5–7.7)
NEUTROPHILS NFR BLD AUTO: 74.4 %
OSMOLALITY SERPL CALC.SUM OF ELEC: 286 MOSM/KG (ref 275–295)
PLATELET # BLD AUTO: 143 10(3)UL (ref 150–450)
POTASSIUM SERPL-SCNC: 3.7 MMOL/L (ref 3.5–5.1)
RBC # BLD AUTO: 4.23 X10(6)UL (ref 3.8–5.8)
SODIUM SERPL-SCNC: 137 MMOL/L (ref 136–145)
WBC # BLD AUTO: 10 X10(3) UL (ref 4–11)

## 2019-03-12 PROCEDURE — 96411 CHEMO IV PUSH ADDL DRUG: CPT

## 2019-03-12 PROCEDURE — 96367 TX/PROPH/DG ADDL SEQ IV INF: CPT

## 2019-03-12 PROCEDURE — 85025 COMPLETE CBC W/AUTO DIFF WBC: CPT

## 2019-03-12 PROCEDURE — 96413 CHEMO IV INFUSION 1 HR: CPT

## 2019-03-12 PROCEDURE — 96549 UNLISTED CHEMOTHERAPY PX: CPT

## 2019-03-12 PROCEDURE — 96415 CHEMO IV INFUSION ADDL HR: CPT

## 2019-03-12 PROCEDURE — 96375 TX/PRO/DX INJ NEW DRUG ADDON: CPT

## 2019-03-12 PROCEDURE — 99214 OFFICE O/P EST MOD 30 MIN: CPT | Performed by: INTERNAL MEDICINE

## 2019-03-12 PROCEDURE — 80053 COMPREHEN METABOLIC PANEL: CPT

## 2019-03-12 RX ORDER — ONDANSETRON 2 MG/ML
8 INJECTION INTRAMUSCULAR; INTRAVENOUS EVERY 6 HOURS PRN
Status: CANCELLED | OUTPATIENT
Start: 2019-03-12

## 2019-03-12 RX ORDER — ATROPINE SULFATE 0.4 MG/ML
0.2 AMPUL (ML) INJECTION ONCE
Status: CANCELLED | OUTPATIENT
Start: 2019-03-12

## 2019-03-12 RX ORDER — FLUOROURACIL 50 MG/ML
2400 INJECTION, SOLUTION INTRAVENOUS CONTINUOUS
Status: DISCONTINUED | OUTPATIENT
Start: 2019-03-12 | End: 2019-03-12

## 2019-03-12 RX ORDER — FLUOROURACIL 50 MG/ML
400 INJECTION, SOLUTION INTRAVENOUS ONCE
Status: CANCELLED | OUTPATIENT
Start: 2019-03-12

## 2019-03-12 RX ORDER — LORAZEPAM 0.5 MG/1
TABLET ORAL EVERY 4 HOURS PRN
Status: CANCELLED | OUTPATIENT
Start: 2019-03-12

## 2019-03-12 RX ORDER — METOCLOPRAMIDE HYDROCHLORIDE 5 MG/ML
10 INJECTION INTRAMUSCULAR; INTRAVENOUS EVERY 6 HOURS PRN
Status: CANCELLED | OUTPATIENT
Start: 2019-03-12

## 2019-03-12 RX ORDER — LORAZEPAM 2 MG/ML
INJECTION INTRAMUSCULAR EVERY 4 HOURS PRN
Status: CANCELLED | OUTPATIENT
Start: 2019-03-12

## 2019-03-12 RX ORDER — ATROPINE SULFATE 0.4 MG/ML
0.2 AMPUL (ML) INJECTION ONCE
Status: COMPLETED | OUTPATIENT
Start: 2019-03-12 | End: 2019-03-12

## 2019-03-12 RX ORDER — FLUOROURACIL 50 MG/ML
400 INJECTION, SOLUTION INTRAVENOUS ONCE
Status: COMPLETED | OUTPATIENT
Start: 2019-03-12 | End: 2019-03-12

## 2019-03-12 RX ORDER — FLUOROURACIL 50 MG/ML
2400 INJECTION, SOLUTION INTRAVENOUS CONTINUOUS
Status: CANCELLED | OUTPATIENT
Start: 2019-03-12

## 2019-03-12 RX ORDER — PROCHLORPERAZINE MALEATE 10 MG
10 TABLET ORAL EVERY 6 HOURS PRN
Status: CANCELLED | OUTPATIENT
Start: 2019-03-12

## 2019-03-12 RX ADMIN — FLUOROURACIL 800 MG: 50 INJECTION, SOLUTION INTRAVENOUS at 14:37:00

## 2019-03-12 RX ADMIN — FLUOROURACIL 4900 MG: 50 INJECTION, SOLUTION INTRAVENOUS at 14:45:00

## 2019-03-12 RX ADMIN — ATROPINE SULFATE 0.2 MG: 0.4 MG/ML AMPUL (ML) INJECTION at 12:14:00

## 2019-03-12 NOTE — PROGRESS NOTES
Pt here for C1D1.   Arrives Ambulating independently, accompanied by Family member           Modifications in dose or schedule: No.     Frequency of blood return and site check throughout administration: Prior to administration and At completion of therapy

## 2019-03-12 NOTE — PROGRESS NOTES
[facility] Progress Note      Patient Name:  Yolanda Curran  YOB: 1950  Medical Record Number:  [id number]    Date of visit: 3/12/2019    CHIEF COMPLAINT: Metastatic pancreatic carcinoma, daphne PE, L leg DVT.     HPI:     76year old t Disp: 90 tablet Rfl: 3   Prochlorperazine Maleate (COMPAZINE) 10 mg tablet Take 1 tablet (10 mg total) by mouth every 6 (six) hours as needed for Nausea.  Disp: 30 tablet Rfl: 3   Ondansetron HCl (ZOFRAN) 8 MG tablet Take 1 tablet (8 mg total) by mouth ever Globulin  4.3 2.8 - 4.4 g/dL    A/G Ratio 0.7 (L) 1.0 - 2.0   CBC W/ DIFFERENTIAL    Collection Time: 03/12/19  9:49 AM   Result Value Ref Range    WBC 10.0 4.0 - 11.0 x10(3) uL    RBC 4.23 3.80 - 5.80 x10(6)uL    HGB 12.5 (L) 13.0 - 17.5 g/dL    HCT 37

## 2019-03-13 ENCOUNTER — TELEPHONE (OUTPATIENT)
Dept: HEMATOLOGY/ONCOLOGY | Facility: HOSPITAL | Age: 69
End: 2019-03-13

## 2019-03-13 NOTE — TELEPHONE ENCOUNTER
Daughter called with questions about supplements. Reviewed with daughter that we instructed vitamin c and melatonin were okay to take, but the other 2 medications are not recommended by the doctor.

## 2019-03-14 ENCOUNTER — NURSE ONLY (OUTPATIENT)
Dept: HEMATOLOGY/ONCOLOGY | Facility: HOSPITAL | Age: 69
End: 2019-03-14
Attending: INTERNAL MEDICINE
Payer: MEDICARE

## 2019-03-14 DIAGNOSIS — C25.9 PANCREATIC CARCINOMA (HCC): Primary | ICD-10-CM

## 2019-03-14 DIAGNOSIS — C78.00 MALIGNANT NEOPLASM METASTATIC TO LUNG, UNSPECIFIED LATERALITY (HCC): ICD-10-CM

## 2019-03-14 DIAGNOSIS — C78.7 LIVER METASTASES (HCC): ICD-10-CM

## 2019-03-14 PROCEDURE — 96372 THER/PROPH/DIAG INJ SC/IM: CPT

## 2019-03-14 RX ORDER — SODIUM CHLORIDE 0.9 % (FLUSH) 0.9 %
10 SYRINGE (ML) INJECTION ONCE
Status: COMPLETED | OUTPATIENT
Start: 2019-03-14 | End: 2019-03-14

## 2019-03-14 RX ORDER — SODIUM CHLORIDE 0.9 % (FLUSH) 0.9 %
10 SYRINGE (ML) INJECTION ONCE
Status: CANCELLED | OUTPATIENT
Start: 2019-03-14

## 2019-03-14 RX ADMIN — SODIUM CHLORIDE 0.9 % (FLUSH) 10 ML: 0.9 % SYRINGE (ML) INJECTION at 15:15:00

## 2019-03-14 NOTE — PROGRESS NOTES
Education Record    Learner:  Patient and Family Member    Disease / Diagnosis:pancreatic ca    Barriers / Limitations:  Language   Comments:    Method:  Demonstration and Discussion   Comments:    General Topics:  Medication, Pain, Precautions, Side effec

## 2019-03-18 ENCOUNTER — TELEPHONE (OUTPATIENT)
Dept: HEMATOLOGY/ONCOLOGY | Facility: HOSPITAL | Age: 69
End: 2019-03-18

## 2019-03-18 NOTE — TELEPHONE ENCOUNTER
Daughter, Nikolas Norman calling requesting a rx for pancreatic enzyme. States, \"she read about it online\". States pt is not having diarrhea. Appetite poor. Discussed with Dr. Bernice Wallace and Rx sent to [address] [place] [address]. Daughter aware.

## 2019-03-20 ENCOUNTER — OFFICE VISIT (OUTPATIENT)
Dept: HEMATOLOGY/ONCOLOGY | Facility: HOSPITAL | Age: 69
End: 2019-03-20
Attending: INTERNAL MEDICINE
Payer: MEDICARE

## 2019-03-20 VITALS
SYSTOLIC BLOOD PRESSURE: 144 MMHG | DIASTOLIC BLOOD PRESSURE: 82 MMHG | TEMPERATURE: 98 F | HEIGHT: 68.5 IN | RESPIRATION RATE: 20 BRPM | HEART RATE: 67 BPM | OXYGEN SATURATION: 99 % | BODY MASS INDEX: 28.19 KG/M2 | WEIGHT: 188.19 LBS

## 2019-03-20 DIAGNOSIS — I26.99 BILATERAL PULMONARY EMBOLISM (HCC): ICD-10-CM

## 2019-03-20 DIAGNOSIS — C25.9 PANCREATIC CARCINOMA (HCC): Primary | ICD-10-CM

## 2019-03-20 DIAGNOSIS — R63.0 ANOREXIA: ICD-10-CM

## 2019-03-20 DIAGNOSIS — C78.7 LIVER METASTASES (HCC): ICD-10-CM

## 2019-03-20 DIAGNOSIS — I82.412 ACUTE DEEP VEIN THROMBOSIS (DVT) OF FEMORAL VEIN OF LEFT LOWER EXTREMITY (HCC): ICD-10-CM

## 2019-03-20 DIAGNOSIS — R10.13 EPIGASTRIC PAIN: ICD-10-CM

## 2019-03-20 DIAGNOSIS — C78.00 MALIGNANT NEOPLASM METASTATIC TO LUNG, UNSPECIFIED LATERALITY (HCC): ICD-10-CM

## 2019-03-20 DIAGNOSIS — T45.1X5A ADVERSE EFFECT OF CHEMOTHERAPY, INITIAL ENCOUNTER: ICD-10-CM

## 2019-03-20 DIAGNOSIS — R53.0 NEOPLASTIC (MALIGNANT) RELATED FATIGUE: ICD-10-CM

## 2019-03-20 LAB
ALBUMIN SERPL-MCNC: 3.3 G/DL (ref 3.4–5)
ALBUMIN/GLOB SERPL: 0.8 {RATIO} (ref 1–2)
ALP LIVER SERPL-CCNC: 553 U/L (ref 45–117)
ALT SERPL-CCNC: 69 U/L (ref 16–61)
ANION GAP SERPL CALC-SCNC: 7 MMOL/L (ref 0–18)
AST SERPL-CCNC: 34 U/L (ref 15–37)
BASOPHILS # BLD: 0.13 X10(3) UL (ref 0–0.2)
BASOPHILS NFR BLD: 1 %
BILIRUB SERPL-MCNC: 0.6 MG/DL (ref 0.1–2)
BUN BLD-MCNC: 12 MG/DL (ref 7–18)
BUN/CREAT SERPL: 16 (ref 10–20)
CALCIUM BLD-MCNC: 8.5 MG/DL (ref 8.5–10.1)
CHLORIDE SERPL-SCNC: 106 MMOL/L (ref 98–107)
CO2 SERPL-SCNC: 26 MMOL/L (ref 21–32)
CREAT BLD-MCNC: 0.75 MG/DL (ref 0.7–1.3)
DEPRECATED RDW RBC AUTO: 43.7 FL (ref 35.1–46.3)
EOSINOPHIL # BLD: 0.66 X10(3) UL (ref 0–0.7)
EOSINOPHIL NFR BLD: 5 %
ERYTHROCYTE [DISTWIDTH] IN BLOOD BY AUTOMATED COUNT: 13.5 % (ref 11–15)
GLOBULIN PLAS-MCNC: 3.9 G/DL (ref 2.8–4.4)
GLUCOSE BLD-MCNC: 81 MG/DL (ref 70–99)
HCT VFR BLD AUTO: 38.4 % (ref 39–53)
HGB BLD-MCNC: 12.8 G/DL (ref 13–17.5)
LYMPHOCYTES NFR BLD: 17 %
LYMPHOCYTES NFR BLD: 2.24 X10(3) UL (ref 1–4)
M PROTEIN MFR SERPL ELPH: 7.2 G/DL (ref 6.4–8.2)
MCH RBC QN AUTO: 29.4 PG (ref 26–34)
MCHC RBC AUTO-ENTMCNC: 33.3 G/DL (ref 31–37)
MCV RBC AUTO: 88.3 FL (ref 80–100)
MONOCYTES # BLD: 1.06 X10(3) UL (ref 0.1–1)
MONOCYTES NFR BLD: 8 %
MORPHOLOGY: NORMAL
NEUTROPHILS # BLD AUTO: 9.02 X10 (3) UL (ref 1.5–7.7)
NEUTROPHILS NFR BLD: 51 %
NEUTS BAND NFR BLD: 18 %
NEUTS HYPERSEG # BLD: 9.11 X10(3) UL (ref 1.5–7.7)
OSMOLALITY SERPL CALC.SUM OF ELEC: 287 MOSM/KG (ref 275–295)
PLATELET # BLD AUTO: 61 10(3)UL (ref 150–450)
PLATELET MORPHOLOGY: NORMAL
POTASSIUM SERPL-SCNC: 3.9 MMOL/L (ref 3.5–5.1)
RBC # BLD AUTO: 4.35 X10(6)UL (ref 3.8–5.8)
SODIUM SERPL-SCNC: 139 MMOL/L (ref 136–145)
TOTAL CELLS COUNTED: 100
WBC # BLD AUTO: 13.2 X10(3) UL (ref 4–11)

## 2019-03-20 PROCEDURE — 85027 COMPLETE CBC AUTOMATED: CPT | Performed by: INTERNAL MEDICINE

## 2019-03-20 PROCEDURE — 36591 DRAW BLOOD OFF VENOUS DEVICE: CPT

## 2019-03-20 PROCEDURE — 80053 COMPREHEN METABOLIC PANEL: CPT | Performed by: INTERNAL MEDICINE

## 2019-03-20 PROCEDURE — 85007 BL SMEAR W/DIFF WBC COUNT: CPT | Performed by: INTERNAL MEDICINE

## 2019-03-20 PROCEDURE — 99215 OFFICE O/P EST HI 40 MIN: CPT | Performed by: INTERNAL MEDICINE

## 2019-03-20 PROCEDURE — 85025 COMPLETE CBC W/AUTO DIFF WBC: CPT | Performed by: INTERNAL MEDICINE

## 2019-03-20 RX ORDER — MULTIVIT WITH MINERALS/LUTEIN
1000 TABLET ORAL DAILY
COMMUNITY

## 2019-03-20 NOTE — PROGRESS NOTES
Outpatient Oncology Care Plan  Problem list:  loss of appetite  constipation  fatigue  knowledge deficit  nausea and vomiting  weight loss    Problems related to:    chemotherapy  disease/disease progression  side effect of treatment    Interventions:  opt

## 2019-03-20 NOTE — PROGRESS NOTES
[facility] Progress Note      Patient Name:  Ralf Austin  YOB: 1950  Medical Record Number:  [id number]    Date of visit: 3/20/2019    CHIEF COMPLAINT: Metastatic pancreatic carcinoma, daphne PE, L leg DVT.     HPI:     76year old t tablet by mouth every 4 (four) hours as needed for Pain. Disp: 40 tablet Rfl: 0   Prochlorperazine Maleate (COMPAZINE) 10 mg tablet Take 1 tablet (10 mg total) by mouth every 6 (six) hours as needed for Nausea.  Disp: 30 tablet Rfl: 3   Ondansetron HCl (ZOF Non- 94 >=60    GFR, -American 109 >=60    AST 34 15 - 37 U/L    ALT 69 (H) 16 - 61 U/L    Alkaline Phosphatase 553 (H) 45 - 117 U/L    Bilirubin, Total 0.6 0.1 - 2.0 mg/dL    Total Protein 7.2 6.4 - 8.2 g/dL    Albumin 3.3 (L) 3.4 - aware that disease is incurable. # L leg DVT/daphne PE: jonel provoked due to bus trip to [facility].  However given metastatic pancreatic carcinoma, he is a candidate for indefinite anticoagulation. Currently on apixaban. # Abd pain: due to malignancy.

## 2019-03-26 ENCOUNTER — TELEPHONE (OUTPATIENT)
Dept: FAMILY MEDICINE CLINIC | Facility: CLINIC | Age: 69
End: 2019-03-26

## 2019-03-26 ENCOUNTER — TELEPHONE (OUTPATIENT)
Dept: HEMATOLOGY/ONCOLOGY | Facility: HOSPITAL | Age: 69
End: 2019-03-26

## 2019-03-26 NOTE — TELEPHONE ENCOUNTER
If it completely resolved and he feels normal could probably take wait and see approach, but if any residual vision or neurologic symptoms or HA or any recurrence def go to ER (not UC)

## 2019-03-26 NOTE — TELEPHONE ENCOUNTER
Left message for nilam with the instructions from Dr. Abigail Manzo- advised that if she has more questions to please call back

## 2019-03-26 NOTE — TELEPHONE ENCOUNTER
Daughter called stating he father went blind for 5 minutes in his right eye. Pt had no symptoms when this happened and his vision is fine now. Instructed to go to the ER to be evaluated.  Instructed to get copies of anything done and to bring with to appt t

## 2019-03-26 NOTE — TELEPHONE ENCOUNTER
Patient could not see out of his right eye for a few minutes this morning. Oncologist wanted him to go to the ER. Daughter wants to know if she should take him to an ER or [facility]?  They also spoke with another Oncologist who said to wait until it happen

## 2019-03-27 ENCOUNTER — OFFICE VISIT (OUTPATIENT)
Dept: HEMATOLOGY/ONCOLOGY | Facility: HOSPITAL | Age: 69
End: 2019-03-27
Attending: INTERNAL MEDICINE
Payer: MEDICARE

## 2019-03-27 VITALS
HEIGHT: 68.5 IN | OXYGEN SATURATION: 98 % | DIASTOLIC BLOOD PRESSURE: 68 MMHG | WEIGHT: 187.63 LBS | BODY MASS INDEX: 28.11 KG/M2 | HEART RATE: 78 BPM | SYSTOLIC BLOOD PRESSURE: 138 MMHG | TEMPERATURE: 97 F | RESPIRATION RATE: 18 BRPM

## 2019-03-27 DIAGNOSIS — D72.828 OTHER ELEVATED WHITE BLOOD CELL (WBC) COUNT: ICD-10-CM

## 2019-03-27 DIAGNOSIS — C78.00 MALIGNANT NEOPLASM METASTATIC TO LUNG, UNSPECIFIED LATERALITY (HCC): ICD-10-CM

## 2019-03-27 DIAGNOSIS — H53.121 TRANSIENT BLINDNESS OF RIGHT EYE: ICD-10-CM

## 2019-03-27 DIAGNOSIS — C25.9 PANCREATIC CARCINOMA (HCC): Primary | ICD-10-CM

## 2019-03-27 DIAGNOSIS — C78.7 LIVER METASTASES (HCC): ICD-10-CM

## 2019-03-27 DIAGNOSIS — R11.0 NAUSEA IN ADULT: ICD-10-CM

## 2019-03-27 DIAGNOSIS — R79.89 ELEVATED LFTS: ICD-10-CM

## 2019-03-27 DIAGNOSIS — Z51.11 ENCOUNTER FOR CHEMOTHERAPY MANAGEMENT: ICD-10-CM

## 2019-03-27 LAB
ALBUMIN SERPL-MCNC: 3.3 G/DL (ref 3.4–5)
ALBUMIN/GLOB SERPL: 0.8 {RATIO} (ref 1–2)
ALP LIVER SERPL-CCNC: 659 U/L (ref 45–117)
ALT SERPL-CCNC: 94 U/L (ref 16–61)
ANION GAP SERPL CALC-SCNC: 7 MMOL/L (ref 0–18)
AST SERPL-CCNC: 69 U/L (ref 15–37)
BASOPHILS # BLD: 0 X10(3) UL (ref 0–0.2)
BASOPHILS NFR BLD: 0 %
BILIRUB SERPL-MCNC: 0.5 MG/DL (ref 0.1–2)
BUN BLD-MCNC: 18 MG/DL (ref 7–18)
BUN/CREAT SERPL: 23.1 (ref 10–20)
CALCIUM BLD-MCNC: 9.1 MG/DL (ref 8.5–10.1)
CANCER AG19-9 SERPL-ACNC: ABNORMAL U/ML (ref ?–37)
CHLORIDE SERPL-SCNC: 106 MMOL/L (ref 98–107)
CO2 SERPL-SCNC: 27 MMOL/L (ref 21–32)
CREAT BLD-MCNC: 0.78 MG/DL (ref 0.7–1.3)
DEPRECATED RDW RBC AUTO: 48.3 FL (ref 35.1–46.3)
EOSINOPHIL # BLD: 0.42 X10(3) UL (ref 0–0.7)
EOSINOPHIL NFR BLD: 2 %
ERYTHROCYTE [DISTWIDTH] IN BLOOD BY AUTOMATED COUNT: 14.8 % (ref 11–15)
GLOBULIN PLAS-MCNC: 3.9 G/DL (ref 2.8–4.4)
GLUCOSE BLD-MCNC: 100 MG/DL (ref 70–99)
HCT VFR BLD AUTO: 39.4 % (ref 39–53)
HGB BLD-MCNC: 12.7 G/DL (ref 13–17.5)
LYMPHOCYTES NFR BLD: 11 %
LYMPHOCYTES NFR BLD: 2.33 X10(3) UL (ref 1–4)
M PROTEIN MFR SERPL ELPH: 7.2 G/DL (ref 6.4–8.2)
MCH RBC QN AUTO: 29.3 PG (ref 26–34)
MCHC RBC AUTO-ENTMCNC: 32.2 G/DL (ref 31–37)
MCV RBC AUTO: 91 FL (ref 80–100)
MONOCYTES # BLD: 0.85 X10(3) UL (ref 0.1–1)
MONOCYTES NFR BLD: 4 %
MORPHOLOGY: NORMAL
MYELOCYTES # BLD: 0.21 X10(3) UL
MYELOCYTES NFR BLD: 1 %
NEUTROPHILS # BLD AUTO: 15.14 X10 (3) UL (ref 1.5–7.7)
NEUTROPHILS NFR BLD: 71 %
NEUTS BAND NFR BLD: 11 %
NEUTS HYPERSEG # BLD: 17.38 X10(3) UL (ref 1.5–7.7)
OSMOLALITY SERPL CALC.SUM OF ELEC: 292 MOSM/KG (ref 275–295)
PLATELET # BLD AUTO: 102 10(3)UL (ref 150–450)
PLATELET MORPHOLOGY: NORMAL
POTASSIUM SERPL-SCNC: 4 MMOL/L (ref 3.5–5.1)
RBC # BLD AUTO: 4.33 X10(6)UL (ref 3.8–5.8)
SODIUM SERPL-SCNC: 140 MMOL/L (ref 136–145)
TOTAL CELLS COUNTED: 100
WBC # BLD AUTO: 21.2 X10(3) UL (ref 4–11)

## 2019-03-27 PROCEDURE — 96415 CHEMO IV INFUSION ADDL HR: CPT

## 2019-03-27 PROCEDURE — 80053 COMPREHEN METABOLIC PANEL: CPT

## 2019-03-27 PROCEDURE — 85027 COMPLETE CBC AUTOMATED: CPT

## 2019-03-27 PROCEDURE — 85025 COMPLETE CBC W/AUTO DIFF WBC: CPT

## 2019-03-27 PROCEDURE — 96375 TX/PRO/DX INJ NEW DRUG ADDON: CPT

## 2019-03-27 PROCEDURE — 86301 IMMUNOASSAY TUMOR CA 19-9: CPT

## 2019-03-27 PROCEDURE — 99215 OFFICE O/P EST HI 40 MIN: CPT | Performed by: CLINICAL NURSE SPECIALIST

## 2019-03-27 PROCEDURE — 96367 TX/PROPH/DG ADDL SEQ IV INF: CPT

## 2019-03-27 PROCEDURE — 96411 CHEMO IV PUSH ADDL DRUG: CPT

## 2019-03-27 PROCEDURE — 96413 CHEMO IV INFUSION 1 HR: CPT

## 2019-03-27 PROCEDURE — 96549 UNLISTED CHEMOTHERAPY PX: CPT

## 2019-03-27 PROCEDURE — 85007 BL SMEAR W/DIFF WBC COUNT: CPT

## 2019-03-27 RX ORDER — ATROPINE SULFATE 0.4 MG/ML
0.2 AMPUL (ML) INJECTION ONCE
Status: CANCELLED | OUTPATIENT
Start: 2019-03-27

## 2019-03-27 RX ORDER — OLANZAPINE 2.5 MG/1
TABLET ORAL NIGHTLY
Qty: 60 TABLET | Refills: 0 | Status: SHIPPED | OUTPATIENT
Start: 2019-03-27 | End: 2019-04-24

## 2019-03-27 RX ORDER — FLUOROURACIL 50 MG/ML
400 INJECTION, SOLUTION INTRAVENOUS ONCE
Status: CANCELLED | OUTPATIENT
Start: 2019-03-27

## 2019-03-27 RX ORDER — LORAZEPAM 0.5 MG/1
TABLET ORAL EVERY 4 HOURS PRN
Status: CANCELLED | OUTPATIENT
Start: 2019-03-27

## 2019-03-27 RX ORDER — METOCLOPRAMIDE HYDROCHLORIDE 5 MG/ML
10 INJECTION INTRAMUSCULAR; INTRAVENOUS EVERY 6 HOURS PRN
Status: CANCELLED | OUTPATIENT
Start: 2019-03-27

## 2019-03-27 RX ORDER — PROCHLORPERAZINE MALEATE 10 MG
10 TABLET ORAL EVERY 6 HOURS PRN
Status: CANCELLED | OUTPATIENT
Start: 2019-03-27

## 2019-03-27 RX ORDER — ONDANSETRON 2 MG/ML
8 INJECTION INTRAMUSCULAR; INTRAVENOUS EVERY 6 HOURS PRN
Status: CANCELLED | OUTPATIENT
Start: 2019-03-27

## 2019-03-27 RX ORDER — LORAZEPAM 2 MG/ML
INJECTION INTRAMUSCULAR EVERY 4 HOURS PRN
Status: CANCELLED | OUTPATIENT
Start: 2019-03-27

## 2019-03-27 RX ORDER — FLUOROURACIL 50 MG/ML
400 INJECTION, SOLUTION INTRAVENOUS ONCE
Status: COMPLETED | OUTPATIENT
Start: 2019-03-27 | End: 2019-03-27

## 2019-03-27 RX ORDER — ATROPINE SULFATE 0.4 MG/ML
0.2 AMPUL (ML) INJECTION ONCE
Status: COMPLETED | OUTPATIENT
Start: 2019-03-27 | End: 2019-03-27

## 2019-03-27 RX ORDER — FLUOROURACIL 50 MG/ML
2400 INJECTION, SOLUTION INTRAVENOUS CONTINUOUS
Status: CANCELLED | OUTPATIENT
Start: 2019-03-27

## 2019-03-27 RX ORDER — FLUOROURACIL 50 MG/ML
2400 INJECTION, SOLUTION INTRAVENOUS CONTINUOUS
Status: DISCONTINUED | OUTPATIENT
Start: 2019-03-27 | End: 2019-03-27

## 2019-03-27 RX ADMIN — ATROPINE SULFATE 0.2 MG: 0.4 MG/ML AMPUL (ML) INJECTION at 10:49:00

## 2019-03-27 RX ADMIN — FLUOROURACIL 4900 MG: 50 INJECTION, SOLUTION INTRAVENOUS at 13:18:00

## 2019-03-27 RX ADMIN — FLUOROURACIL 800 MG: 50 INJECTION, SOLUTION INTRAVENOUS at 13:10:00

## 2019-03-27 NOTE — PROGRESS NOTES
ANP Visit Note    Patient Name: Joe Mckinley   YOB: 1950   Medical Record Number: [id number]   CSN: [id number]   Date of visit: 3/27/2019       Chief Complaint/Reason for Visit:  Metastatic Pancreatic Cancer    Oncologic History:  2.19.19: DV History:  Social History    Socioeconomic History      Marital status:       Spouse name: Not on file      Number of children: Not on file      Years of education: Not on file      Highest education level: Not on file    Occupational History      No 3  •  HYDROcodone-acetaminophen 5-325 MG Oral Tab, Take 1 tablet by mouth every 4 (four) hours as needed for Pain., Disp: 40 tablet, Rfl: 0  •  Prochlorperazine Maleate (COMPAZINE) 10 mg tablet, Take 1 tablet (10 mg total) by mouth every 6 (six) hours as n - 18 mmol/L    BUN 18 7 - 18 mg/dL    Creatinine 0.78 0.70 - 1.30 mg/dL    BUN/CREA Ratio 23.1 (H) 10.0 - 20.0    Calcium, Total 9.1 8.5 - 10.1 mg/dL    Calculated Osmolality 292 275 - 295 mOsm/kg    GFR, Non- 93 >=60    GFR, -Americ may increase to 5 mg if needed. 4. Transient visual disturbance: continue to monitor, work up negative in ER.    5. Leukocytosis: secondary to Fulphila     Emotional Well Being:  I have assessed the patient's emotional well-being and any concerns about anx

## 2019-03-27 NOTE — PROGRESS NOTES
Pt here for C2D1.   Arrives Ambulating independently, accompanied by Family member           Modifications in dose or schedule: No.     Frequency of blood return and site check throughout administration: Prior to administration and At completion of therapy

## 2019-03-27 NOTE — PROGRESS NOTES
Patient presents with: Follow - Up: APN assessment prior to treatment    Pt is here for treatment; C2 D1 folfirinox is expected. Pt was seen yesterday at an outside ER following an episode of 5-10 minutes of blindness mostly affecting his right eye.  Today

## 2019-03-28 ENCOUNTER — TELEPHONE (OUTPATIENT)
Dept: HEMATOLOGY/ONCOLOGY | Facility: HOSPITAL | Age: 69
End: 2019-03-28

## 2019-03-29 ENCOUNTER — OFFICE VISIT (OUTPATIENT)
Dept: HEMATOLOGY/ONCOLOGY | Facility: HOSPITAL | Age: 69
End: 2019-03-29
Attending: INTERNAL MEDICINE
Payer: MEDICARE

## 2019-03-29 DIAGNOSIS — C78.7 LIVER METASTASES (HCC): ICD-10-CM

## 2019-03-29 PROCEDURE — 96523 IRRIG DRUG DELIVERY DEVICE: CPT

## 2019-03-29 NOTE — PROGRESS NOTES
Brief nutrition f/u note: pt left prior to RD visit. [place] written information covering nutrition during cancer tx and nutrition w/ c/o N/V from [facility] mailed.

## 2019-04-01 ENCOUNTER — TELEPHONE (OUTPATIENT)
Dept: HEMATOLOGY/ONCOLOGY | Facility: HOSPITAL | Age: 69
End: 2019-04-01

## 2019-04-01 NOTE — TELEPHONE ENCOUNTER
Taylor Sweet, pt's daughter calling for pt's LFT results. Will have MD review results and call daughter back.        Message forwarded to Dr. Светлана Root-

## 2019-04-02 NOTE — TELEPHONE ENCOUNTER
Arabella Mims MD  P Edw B[facility] [address] Nunes Rns   Caller: Unspecified (Yesterday,  3:15 PM)             Call daughter Aly Kohli, liver enzymes elevated likely due to chemo. No action needed at this time.  Will follow and repeat CT after 4 cycles as planned     Pt's

## 2019-04-09 ENCOUNTER — TELEPHONE (OUTPATIENT)
Dept: HEMATOLOGY/ONCOLOGY | Facility: HOSPITAL | Age: 69
End: 2019-04-09

## 2019-04-09 ENCOUNTER — SOCIAL WORK SERVICES (OUTPATIENT)
Dept: HEMATOLOGY/ONCOLOGY | Facility: HOSPITAL | Age: 69
End: 2019-04-09

## 2019-04-09 NOTE — PROGRESS NOTES
[facility] Progress Note      Patient Name:  Chris Alcazar  YOB: 1950  Medical Record Number:  [id number]    Date of visit: 4/10/2019    CHIEF COMPLAINT: Metastatic pancreatic carcinoma, daphne PE, L leg DVT.     HPI:     71year old t (5,000 Units total) by mouth 3 (three) times daily with meals. Disp: 90 capsule Rfl: 3   Prochlorperazine Maleate (COMPAZINE) 10 mg tablet Take 1 tablet (10 mg total) by mouth every 6 (six) hours as needed for Nausea.  Disp: 30 tablet Rfl: 3   Ondansetron H 1.30 mg/dL    BUN/CREA Ratio 19.7 10.0 - 20.0    Calcium, Total 8.8 8.5 - 10.1 mg/dL    Calculated Osmolality 294 275 - 295 mOsm/kg    GFR, Non- 99 >=60    GFR, -American 114 >=60    AST 39 (H) 15 - 37 U/L    ALT 45 16 - 61 U/L    Al incurable. # L leg DVT/daphne PE: jonel provoked due to bus trip to [facility].  However given metastatic pancreatic carcinoma, he is a candidate for indefinite anticoagulation. Currently on apixaban. # Abd pain: due to malignancy.   Pain has improved sinc

## 2019-04-09 NOTE — TELEPHONE ENCOUNTER
Daughter Toya Bonita called stating the stomach flu hit their house and Nnamdi had a bought of vomiting Saturday night followed by 2 days of diarrhea. Both the antinausea and imodium took care of it. Pt back to normal as of yesterday.  Daughter said energy was

## 2019-04-09 NOTE — PROGRESS NOTES
SW partially completed [facility] medical cannabis application, attached listing of dispensaries, and placed at 2nd floor desk with instructions for patient and his daughter Alice Plata to  before his 4/10 appointment.   Encouraged Alice Plata to call if further rene

## 2019-04-10 ENCOUNTER — OFFICE VISIT (OUTPATIENT)
Dept: HEMATOLOGY/ONCOLOGY | Facility: HOSPITAL | Age: 69
End: 2019-04-10
Attending: INTERNAL MEDICINE
Payer: MEDICARE

## 2019-04-10 VITALS
SYSTOLIC BLOOD PRESSURE: 147 MMHG | HEIGHT: 68.5 IN | WEIGHT: 182.81 LBS | HEART RATE: 64 BPM | OXYGEN SATURATION: 100 % | TEMPERATURE: 98 F | RESPIRATION RATE: 18 BRPM | DIASTOLIC BLOOD PRESSURE: 68 MMHG | BODY MASS INDEX: 27.39 KG/M2

## 2019-04-10 DIAGNOSIS — C25.9 PANCREATIC CARCINOMA (HCC): Primary | ICD-10-CM

## 2019-04-10 DIAGNOSIS — D70.1 CHEMOTHERAPY INDUCED NEUTROPENIA (HCC): ICD-10-CM

## 2019-04-10 DIAGNOSIS — C78.7 LIVER METASTASES (HCC): ICD-10-CM

## 2019-04-10 DIAGNOSIS — R53.0 NEOPLASTIC (MALIGNANT) RELATED FATIGUE: ICD-10-CM

## 2019-04-10 DIAGNOSIS — T45.1X5A CHEMOTHERAPY INDUCED NEUTROPENIA (HCC): ICD-10-CM

## 2019-04-10 DIAGNOSIS — R11.0 NAUSEA: Primary | ICD-10-CM

## 2019-04-10 DIAGNOSIS — T45.1X5D ADVERSE EFFECT OF CHEMOTHERAPY, SUBSEQUENT ENCOUNTER: ICD-10-CM

## 2019-04-10 DIAGNOSIS — Z71.89 ADVANCED DIRECTIVES, COUNSELING/DISCUSSION: ICD-10-CM

## 2019-04-10 DIAGNOSIS — C78.00 MALIGNANT NEOPLASM METASTATIC TO LUNG, UNSPECIFIED LATERALITY (HCC): ICD-10-CM

## 2019-04-10 DIAGNOSIS — R79.89 ELEVATED LFTS: ICD-10-CM

## 2019-04-10 PROCEDURE — 96375 TX/PRO/DX INJ NEW DRUG ADDON: CPT

## 2019-04-10 PROCEDURE — 96411 CHEMO IV PUSH ADDL DRUG: CPT

## 2019-04-10 PROCEDURE — 99215 OFFICE O/P EST HI 40 MIN: CPT | Performed by: NURSE PRACTITIONER

## 2019-04-10 PROCEDURE — 85025 COMPLETE CBC W/AUTO DIFF WBC: CPT

## 2019-04-10 PROCEDURE — 96413 CHEMO IV INFUSION 1 HR: CPT

## 2019-04-10 PROCEDURE — 96549 UNLISTED CHEMOTHERAPY PX: CPT

## 2019-04-10 PROCEDURE — 99214 OFFICE O/P EST MOD 30 MIN: CPT | Performed by: INTERNAL MEDICINE

## 2019-04-10 PROCEDURE — 80053 COMPREHEN METABOLIC PANEL: CPT

## 2019-04-10 PROCEDURE — 96415 CHEMO IV INFUSION ADDL HR: CPT

## 2019-04-10 PROCEDURE — 96367 TX/PROPH/DG ADDL SEQ IV INF: CPT

## 2019-04-10 PROCEDURE — 86301 IMMUNOASSAY TUMOR CA 19-9: CPT

## 2019-04-10 PROCEDURE — 96368 THER/DIAG CONCURRENT INF: CPT

## 2019-04-10 RX ORDER — FLUOROURACIL 50 MG/ML
400 INJECTION, SOLUTION INTRAVENOUS ONCE
Status: COMPLETED | OUTPATIENT
Start: 2019-04-10 | End: 2019-04-10

## 2019-04-10 RX ORDER — ONDANSETRON 2 MG/ML
8 INJECTION INTRAMUSCULAR; INTRAVENOUS EVERY 6 HOURS PRN
Status: CANCELLED | OUTPATIENT
Start: 2019-04-10

## 2019-04-10 RX ORDER — PROCHLORPERAZINE MALEATE 10 MG
10 TABLET ORAL EVERY 6 HOURS PRN
Status: CANCELLED | OUTPATIENT
Start: 2019-04-10

## 2019-04-10 RX ORDER — ATROPINE SULFATE 0.4 MG/ML
0.2 AMPUL (ML) INJECTION ONCE
Status: CANCELLED | OUTPATIENT
Start: 2019-04-10

## 2019-04-10 RX ORDER — LORAZEPAM 2 MG/ML
INJECTION INTRAMUSCULAR EVERY 4 HOURS PRN
Status: CANCELLED | OUTPATIENT
Start: 2019-04-10

## 2019-04-10 RX ORDER — METOCLOPRAMIDE HYDROCHLORIDE 5 MG/ML
10 INJECTION INTRAMUSCULAR; INTRAVENOUS EVERY 6 HOURS PRN
Status: CANCELLED | OUTPATIENT
Start: 2019-04-10

## 2019-04-10 RX ORDER — ATROPINE SULFATE 0.4 MG/ML
0.2 AMPUL (ML) INJECTION ONCE
Status: COMPLETED | OUTPATIENT
Start: 2019-04-10 | End: 2019-04-10

## 2019-04-10 RX ORDER — FLUOROURACIL 50 MG/ML
400 INJECTION, SOLUTION INTRAVENOUS ONCE
Status: CANCELLED | OUTPATIENT
Start: 2019-04-10

## 2019-04-10 RX ORDER — LORAZEPAM 0.5 MG/1
TABLET ORAL EVERY 4 HOURS PRN
Status: CANCELLED | OUTPATIENT
Start: 2019-04-10

## 2019-04-10 RX ORDER — FLUOROURACIL 50 MG/ML
2000 INJECTION, SOLUTION INTRAVENOUS CONTINUOUS
Status: DISCONTINUED | OUTPATIENT
Start: 2019-04-10 | End: 2019-04-10

## 2019-04-10 RX ORDER — FLUOROURACIL 50 MG/ML
2000 INJECTION, SOLUTION INTRAVENOUS CONTINUOUS
Status: CANCELLED | OUTPATIENT
Start: 2019-04-10

## 2019-04-10 RX ADMIN — FLUOROURACIL 800 MG: 50 INJECTION, SOLUTION INTRAVENOUS at 14:35:00

## 2019-04-10 RX ADMIN — ATROPINE SULFATE 0.2 MG: 0.4 MG/ML AMPUL (ML) INJECTION at 12:11:00

## 2019-04-10 RX ADMIN — FLUOROURACIL 4100 MG: 50 INJECTION, SOLUTION INTRAVENOUS at 14:42:00

## 2019-04-10 NOTE — PROGRESS NOTES
Outpatient Oncology Care Plan  Problem list:  diarrhea  fatigue  knowledge deficit  nausea and vomiting    Problems related to:    chemotherapy    Interventions:  provided general teaching    Expected outcomes:  adequate sleep/rest  free of infection  opti

## 2019-04-10 NOTE — PROGRESS NOTES
Pt here for C3D1.   Arrives Ambulating independently, accompanied by Family member           Modifications in dose or schedule: Yes Dose reduction to Oxaliplatin, Irinotecan, 5FU     Frequency of blood return and site check throughout administration: Prior

## 2019-04-11 PROBLEM — Z51.5 PALLIATIVE CARE BY SPECIALIST: Status: ACTIVE | Noted: 2019-04-11

## 2019-04-11 NOTE — PROGRESS NOTES
Palliative Care Consult Note     Patient Name: Adria Worley   YOB: 1950   Medical Record Number: [id number]   CSN: [id number]   Date of visit: 4/11/2019     Reason for Consult:  Symptom management and goals of care    Chief Complaint/Reason f Not on file    Tobacco Use      Smoking status: Never Smoker      Smokeless tobacco: Never Used    Substance and Sexual Activity      Alcohol use: No        Alcohol/week: 0.0 oz      Drug use: No    Buddhist/Cultural Information:   Current living situatio appropriate    Advanced Directives Discussed and Completed:   HCPOA: discussed the need to complete. POLST Discussed and Completed:     Palliative Care:  His symptoms are controlled. Encouraged ongoing use or alternating antiemetics as needed.   He is mimi

## 2019-04-12 ENCOUNTER — OFFICE VISIT (OUTPATIENT)
Dept: HEMATOLOGY/ONCOLOGY | Facility: HOSPITAL | Age: 69
End: 2019-04-12
Attending: INTERNAL MEDICINE
Payer: MEDICARE

## 2019-04-12 DIAGNOSIS — C78.7 LIVER METASTASES (HCC): ICD-10-CM

## 2019-04-12 DIAGNOSIS — C78.00 MALIGNANT NEOPLASM METASTATIC TO LUNG, UNSPECIFIED LATERALITY (HCC): ICD-10-CM

## 2019-04-12 DIAGNOSIS — C25.9 PANCREATIC CARCINOMA (HCC): Primary | ICD-10-CM

## 2019-04-12 PROCEDURE — 96372 THER/PROPH/DIAG INJ SC/IM: CPT

## 2019-04-12 NOTE — PROGRESS NOTES
Education Record    Learner:  Patient and Spouse    Disease / Diagnosis: pancreatic carcinoma    Barriers / Limitations:  None   Comments:    Method:  Brief focused   Comments:    General Topics:  Plan of care reviewed   Comments:    Outcome:  Shows unders

## 2019-04-24 ENCOUNTER — OFFICE VISIT (OUTPATIENT)
Dept: HEMATOLOGY/ONCOLOGY | Facility: HOSPITAL | Age: 69
End: 2019-04-24
Attending: INTERNAL MEDICINE
Payer: MEDICARE

## 2019-04-24 VITALS
HEART RATE: 70 BPM | SYSTOLIC BLOOD PRESSURE: 149 MMHG | HEIGHT: 68.5 IN | DIASTOLIC BLOOD PRESSURE: 80 MMHG | OXYGEN SATURATION: 99 % | TEMPERATURE: 97 F | WEIGHT: 186.38 LBS | BODY MASS INDEX: 27.92 KG/M2 | RESPIRATION RATE: 18 BRPM

## 2019-04-24 DIAGNOSIS — C25.9 PANCREATIC CARCINOMA (HCC): Primary | ICD-10-CM

## 2019-04-24 DIAGNOSIS — Z51.11 ENCOUNTER FOR CHEMOTHERAPY MANAGEMENT: ICD-10-CM

## 2019-04-24 DIAGNOSIS — R42 DIZZY: ICD-10-CM

## 2019-04-24 DIAGNOSIS — R10.13 EPIGASTRIC PAIN: ICD-10-CM

## 2019-04-24 DIAGNOSIS — C78.7 LIVER METASTASES (HCC): ICD-10-CM

## 2019-04-24 DIAGNOSIS — R19.7 DIARRHEA, UNSPECIFIED TYPE: ICD-10-CM

## 2019-04-24 DIAGNOSIS — R11.0 NAUSEA: ICD-10-CM

## 2019-04-24 DIAGNOSIS — D72.828 OTHER ELEVATED WHITE BLOOD CELL (WBC) COUNT: ICD-10-CM

## 2019-04-24 DIAGNOSIS — C78.00 MALIGNANT NEOPLASM METASTATIC TO LUNG, UNSPECIFIED LATERALITY (HCC): ICD-10-CM

## 2019-04-24 PROCEDURE — 96411 CHEMO IV PUSH ADDL DRUG: CPT

## 2019-04-24 PROCEDURE — 96415 CHEMO IV INFUSION ADDL HR: CPT

## 2019-04-24 PROCEDURE — 96375 TX/PRO/DX INJ NEW DRUG ADDON: CPT

## 2019-04-24 PROCEDURE — 85007 BL SMEAR W/DIFF WBC COUNT: CPT

## 2019-04-24 PROCEDURE — 86301 IMMUNOASSAY TUMOR CA 19-9: CPT

## 2019-04-24 PROCEDURE — 85025 COMPLETE CBC W/AUTO DIFF WBC: CPT

## 2019-04-24 PROCEDURE — 85027 COMPLETE CBC AUTOMATED: CPT

## 2019-04-24 PROCEDURE — 99215 OFFICE O/P EST HI 40 MIN: CPT | Performed by: CLINICAL NURSE SPECIALIST

## 2019-04-24 PROCEDURE — 80053 COMPREHEN METABOLIC PANEL: CPT

## 2019-04-24 PROCEDURE — 96367 TX/PROPH/DG ADDL SEQ IV INF: CPT

## 2019-04-24 PROCEDURE — 96549 UNLISTED CHEMOTHERAPY PX: CPT

## 2019-04-24 PROCEDURE — 96413 CHEMO IV INFUSION 1 HR: CPT

## 2019-04-24 RX ORDER — ATROPINE SULFATE 0.4 MG/ML
0.2 AMPUL (ML) INJECTION ONCE
Status: COMPLETED | OUTPATIENT
Start: 2019-04-24 | End: 2019-04-24

## 2019-04-24 RX ORDER — OLANZAPINE 2.5 MG/1
TABLET ORAL NIGHTLY
Qty: 60 TABLET | Refills: 3 | Status: SHIPPED | OUTPATIENT
Start: 2019-04-24 | End: 2019-10-09

## 2019-04-24 RX ORDER — FLUOROURACIL 50 MG/ML
400 INJECTION, SOLUTION INTRAVENOUS ONCE
Status: COMPLETED | OUTPATIENT
Start: 2019-04-24 | End: 2019-04-24

## 2019-04-24 RX ORDER — LORAZEPAM 0.5 MG/1
TABLET ORAL EVERY 4 HOURS PRN
Status: CANCELLED | OUTPATIENT
Start: 2019-04-24

## 2019-04-24 RX ORDER — METOCLOPRAMIDE HYDROCHLORIDE 5 MG/ML
10 INJECTION INTRAMUSCULAR; INTRAVENOUS EVERY 6 HOURS PRN
Status: CANCELLED | OUTPATIENT
Start: 2019-04-24

## 2019-04-24 RX ORDER — ONDANSETRON 2 MG/ML
8 INJECTION INTRAMUSCULAR; INTRAVENOUS EVERY 6 HOURS PRN
Status: CANCELLED | OUTPATIENT
Start: 2019-04-24

## 2019-04-24 RX ORDER — PROCHLORPERAZINE MALEATE 10 MG
10 TABLET ORAL EVERY 6 HOURS PRN
Status: CANCELLED | OUTPATIENT
Start: 2019-04-24

## 2019-04-24 RX ORDER — LORAZEPAM 2 MG/ML
INJECTION INTRAMUSCULAR EVERY 4 HOURS PRN
Status: CANCELLED | OUTPATIENT
Start: 2019-04-24

## 2019-04-24 RX ORDER — ATROPINE SULFATE 0.4 MG/ML
0.2 AMPUL (ML) INJECTION ONCE
Status: CANCELLED | OUTPATIENT
Start: 2019-04-24

## 2019-04-24 RX ORDER — FLUOROURACIL 50 MG/ML
400 INJECTION, SOLUTION INTRAVENOUS ONCE
Status: CANCELLED | OUTPATIENT
Start: 2019-04-24

## 2019-04-24 RX ORDER — FLUOROURACIL 50 MG/ML
2400 INJECTION, SOLUTION INTRAVENOUS CONTINUOUS
Status: CANCELLED | OUTPATIENT
Start: 2019-04-24

## 2019-04-24 RX ORDER — FLUOROURACIL 50 MG/ML
2400 INJECTION, SOLUTION INTRAVENOUS CONTINUOUS
Status: DISCONTINUED | OUTPATIENT
Start: 2019-04-24 | End: 2019-04-24

## 2019-04-24 RX ADMIN — ATROPINE SULFATE 0.2 MG: 0.4 MG/ML AMPUL (ML) INJECTION at 10:55:00

## 2019-04-24 RX ADMIN — FLUOROURACIL 4900 MG: 50 INJECTION, SOLUTION INTRAVENOUS at 13:18:00

## 2019-04-24 RX ADMIN — FLUOROURACIL 800 MG: 50 INJECTION, SOLUTION INTRAVENOUS at 13:10:00

## 2019-04-24 NOTE — PROGRESS NOTES
Pt here for C4D1.   Arrives Ambulating independently, accompanied by Self and Family member           Modifications in dose or schedule: No     Frequency of blood return and site check throughout administration: Prior to administration and Prior to each shobha

## 2019-04-24 NOTE — PROGRESS NOTES
ANP Visit Note    Patient Name: Leida Brown   YOB: 1950   Medical Record Number: [id number]   [facility]: [id number]   Date of visit: 4/24/2019       Chief Complaint/Reason for Visit:  Metastatic Pancreatic Cancer, Bilateral PE, LLE DVT      [place]colo Problem Relation Age of Onset   • Other (Other) Brother         in a wheel chair for back problems   • Obesity Sister        Social History:  Social History    Socioeconomic History      Marital status:       Spouse name: Not on file      Number o (VITAMIN C) 1000 MG Oral Tab, Take 1,000 mg by mouth daily. , Disp: , Rfl:   •  pancrelipase, Lip-Prot-Amyl, 5000-61267 units Oral Cap DR Particles, Take 1 capsule (5,000 Units total) by mouth 3 (three) times daily with meals. , Disp: 90 capsule, Rfl: 3  • mmol/L    Potassium 3.8 3.5 - 5.1 mmol/L    Chloride 107 98 - 112 mmol/L    CO2 28.0 21.0 - 32.0 mmol/L    Anion Gap 6 0 - 18 mmol/L    BUN 12 7 - 18 mg/dL    Creatinine 0.76 0.70 - 1.30 mg/dL    BUN/CREA Ratio 15.8 10.0 - 20.0    Calcium, Total 9.0 8.5 - VIVIANE landis MD, Chemo, Needs CT prior to appointment.      Risk Level: High: Metastatic Pancreatic Cancer     Electronically Signed by:    Stan Clayton ANP-BC  Nurse Practitioner  [facility]

## 2019-04-26 ENCOUNTER — OFFICE VISIT (OUTPATIENT)
Dept: HEMATOLOGY/ONCOLOGY | Facility: HOSPITAL | Age: 69
End: 2019-04-26
Attending: INTERNAL MEDICINE
Payer: MEDICARE

## 2019-04-26 VITALS
SYSTOLIC BLOOD PRESSURE: 122 MMHG | OXYGEN SATURATION: 98 % | TEMPERATURE: 97 F | RESPIRATION RATE: 16 BRPM | DIASTOLIC BLOOD PRESSURE: 65 MMHG | HEART RATE: 57 BPM

## 2019-04-26 DIAGNOSIS — C78.7 LIVER METASTASES (HCC): ICD-10-CM

## 2019-04-26 DIAGNOSIS — C78.00 MALIGNANT NEOPLASM METASTATIC TO LUNG, UNSPECIFIED LATERALITY (HCC): ICD-10-CM

## 2019-04-26 DIAGNOSIS — C25.9 PANCREATIC CARCINOMA (HCC): Primary | ICD-10-CM

## 2019-04-26 PROCEDURE — 96372 THER/PROPH/DIAG INJ SC/IM: CPT

## 2019-04-26 PROCEDURE — 96361 HYDRATE IV INFUSION ADD-ON: CPT

## 2019-04-26 PROCEDURE — 96360 HYDRATION IV INFUSION INIT: CPT

## 2019-04-26 RX ORDER — SODIUM CHLORIDE 0.9 % (FLUSH) 0.9 %
10 SYRINGE (ML) INJECTION ONCE
Status: CANCELLED | OUTPATIENT
Start: 2019-04-26

## 2019-04-26 RX ORDER — SODIUM CHLORIDE 0.9 % (FLUSH) 0.9 %
10 SYRINGE (ML) INJECTION ONCE
Status: COMPLETED | OUTPATIENT
Start: 2019-04-26 | End: 2019-04-26

## 2019-04-26 RX ADMIN — SODIUM CHLORIDE 0.9 % (FLUSH) 10 ML: 0.9 % SYRINGE (ML) INJECTION at 12:35:00

## 2019-04-26 NOTE — PROGRESS NOTES
Education Record    Learner:  Patient and Spouse    Disease / Diagnosis: Pancreatic Carcinoma    Barriers / Limitations:  None   Comments:    Method:  Brief focused   Comments:    General Topics:  Side effects and symptom management and Plan of care review

## 2019-05-03 ENCOUNTER — TELEPHONE (OUTPATIENT)
Dept: HEMATOLOGY/ONCOLOGY | Facility: HOSPITAL | Age: 69
End: 2019-05-03

## 2019-05-03 NOTE — TELEPHONE ENCOUNTER
Daughter Jef Hall called- pt is trying to lower his cost of medications. They are wanting to discuss alternative medications for his eliquis and pancrelipase. Attempted to call daughter back, but it went straight to .  Left message asking her to call noemí

## 2019-05-06 ENCOUNTER — HOSPITAL ENCOUNTER (OUTPATIENT)
Dept: CT IMAGING | Facility: HOSPITAL | Age: 69
Discharge: HOME | End: 2019-05-06
Attending: CLINICAL NURSE SPECIALIST
Payer: MEDICARE

## 2019-05-06 DIAGNOSIS — C78.7 LIVER METASTASES (HCC): ICD-10-CM

## 2019-05-06 DIAGNOSIS — R10.13 EPIGASTRIC PAIN: ICD-10-CM

## 2019-05-06 DIAGNOSIS — C25.9 PANCREATIC CARCINOMA (HCC): ICD-10-CM

## 2019-05-06 PROCEDURE — 74177 CT ABD & PELVIS W/CONTRAST: CPT | Performed by: CLINICAL NURSE SPECIALIST

## 2019-05-08 ENCOUNTER — TELEPHONE (OUTPATIENT)
Dept: HEMATOLOGY/ONCOLOGY | Facility: HOSPITAL | Age: 69
End: 2019-05-08

## 2019-05-08 ENCOUNTER — OFFICE VISIT (OUTPATIENT)
Dept: HEMATOLOGY/ONCOLOGY | Facility: HOSPITAL | Age: 69
End: 2019-05-08
Attending: INTERNAL MEDICINE
Payer: MEDICARE

## 2019-05-08 VITALS
DIASTOLIC BLOOD PRESSURE: 72 MMHG | HEART RATE: 65 BPM | SYSTOLIC BLOOD PRESSURE: 140 MMHG | TEMPERATURE: 98 F | WEIGHT: 183.63 LBS | OXYGEN SATURATION: 98 % | HEIGHT: 68.5 IN | BODY MASS INDEX: 27.51 KG/M2 | RESPIRATION RATE: 18 BRPM

## 2019-05-08 DIAGNOSIS — C78.7 LIVER METASTASES (HCC): ICD-10-CM

## 2019-05-08 DIAGNOSIS — I82.412 ACUTE DEEP VEIN THROMBOSIS (DVT) OF FEMORAL VEIN OF LEFT LOWER EXTREMITY (HCC): ICD-10-CM

## 2019-05-08 DIAGNOSIS — I26.99 BILATERAL PULMONARY EMBOLISM (HCC): ICD-10-CM

## 2019-05-08 DIAGNOSIS — C25.9 PANCREATIC CARCINOMA (HCC): Primary | ICD-10-CM

## 2019-05-08 DIAGNOSIS — C78.00 MALIGNANT NEOPLASM METASTATIC TO LUNG, UNSPECIFIED LATERALITY (HCC): ICD-10-CM

## 2019-05-08 PROCEDURE — 85007 BL SMEAR W/DIFF WBC COUNT: CPT

## 2019-05-08 PROCEDURE — 85025 COMPLETE CBC W/AUTO DIFF WBC: CPT

## 2019-05-08 PROCEDURE — 96411 CHEMO IV PUSH ADDL DRUG: CPT

## 2019-05-08 PROCEDURE — 99214 OFFICE O/P EST MOD 30 MIN: CPT | Performed by: INTERNAL MEDICINE

## 2019-05-08 PROCEDURE — 80053 COMPREHEN METABOLIC PANEL: CPT

## 2019-05-08 PROCEDURE — 96413 CHEMO IV INFUSION 1 HR: CPT

## 2019-05-08 PROCEDURE — 96415 CHEMO IV INFUSION ADDL HR: CPT

## 2019-05-08 PROCEDURE — 85027 COMPLETE CBC AUTOMATED: CPT

## 2019-05-08 PROCEDURE — 96549 UNLISTED CHEMOTHERAPY PX: CPT

## 2019-05-08 PROCEDURE — 96375 TX/PRO/DX INJ NEW DRUG ADDON: CPT

## 2019-05-08 PROCEDURE — 86301 IMMUNOASSAY TUMOR CA 19-9: CPT

## 2019-05-08 PROCEDURE — 96367 TX/PROPH/DG ADDL SEQ IV INF: CPT

## 2019-05-08 RX ORDER — METOCLOPRAMIDE HYDROCHLORIDE 5 MG/ML
10 INJECTION INTRAMUSCULAR; INTRAVENOUS EVERY 6 HOURS PRN
Status: CANCELLED | OUTPATIENT
Start: 2019-05-08

## 2019-05-08 RX ORDER — FLUOROURACIL 50 MG/ML
2400 INJECTION, SOLUTION INTRAVENOUS CONTINUOUS
Status: CANCELLED | OUTPATIENT
Start: 2019-05-08

## 2019-05-08 RX ORDER — ATROPINE SULFATE 0.4 MG/ML
0.2 AMPUL (ML) INJECTION ONCE
Status: CANCELLED | OUTPATIENT
Start: 2019-05-08

## 2019-05-08 RX ORDER — ATROPINE SULFATE 0.4 MG/ML
0.2 AMPUL (ML) INJECTION ONCE
Status: COMPLETED | OUTPATIENT
Start: 2019-05-08 | End: 2019-05-08

## 2019-05-08 RX ORDER — LORAZEPAM 2 MG/ML
INJECTION INTRAMUSCULAR EVERY 4 HOURS PRN
Status: CANCELLED | OUTPATIENT
Start: 2019-05-08

## 2019-05-08 RX ORDER — FLUOROURACIL 50 MG/ML
400 INJECTION, SOLUTION INTRAVENOUS ONCE
Status: CANCELLED | OUTPATIENT
Start: 2019-05-08

## 2019-05-08 RX ORDER — ONDANSETRON 2 MG/ML
8 INJECTION INTRAMUSCULAR; INTRAVENOUS EVERY 6 HOURS PRN
Status: CANCELLED | OUTPATIENT
Start: 2019-05-08

## 2019-05-08 RX ORDER — FLUOROURACIL 50 MG/ML
2400 INJECTION, SOLUTION INTRAVENOUS CONTINUOUS
Status: DISCONTINUED | OUTPATIENT
Start: 2019-05-08 | End: 2019-05-08

## 2019-05-08 RX ORDER — LORAZEPAM 0.5 MG/1
TABLET ORAL EVERY 4 HOURS PRN
Status: CANCELLED | OUTPATIENT
Start: 2019-05-08

## 2019-05-08 RX ORDER — PROCHLORPERAZINE MALEATE 10 MG
10 TABLET ORAL EVERY 6 HOURS PRN
Status: CANCELLED | OUTPATIENT
Start: 2019-05-08

## 2019-05-08 RX ORDER — FLUOROURACIL 50 MG/ML
400 INJECTION, SOLUTION INTRAVENOUS ONCE
Status: COMPLETED | OUTPATIENT
Start: 2019-05-08 | End: 2019-05-08

## 2019-05-08 RX ADMIN — FLUOROURACIL 4750 MG: 50 INJECTION, SOLUTION INTRAVENOUS at 14:19:00

## 2019-05-08 RX ADMIN — FLUOROURACIL 800 MG: 50 INJECTION, SOLUTION INTRAVENOUS at 14:10:00

## 2019-05-08 RX ADMIN — ATROPINE SULFATE 0.2 MG: 0.4 MG/ML AMPUL (ML) INJECTION at 11:37:00

## 2019-05-08 NOTE — PROGRESS NOTES
Pt here for C5D1 FOLFIRINOX.   Arrives Ambulating independently, accompanied by Family member           Modifications in dose or schedule: No     Frequency of blood return and site check throughout administration: Prior to administration, Prior to each drug

## 2019-05-08 NOTE — PROGRESS NOTES
Outpatient Oncology Care Plan  Problem list:  knowledge deficit  altered taste buds    Problems related to:    chemotherapy  disease/disease progression    Interventions:  optimize nutrition status  provided nutrition support  provided general teaching  pr

## 2019-05-08 NOTE — PROGRESS NOTES
[facility] Progress Note      Patient Name:  Danya Mathews  YOB: 1950  Medical Record Number:  [id number]    Date of visit: 5/8/2019      CHIEF COMPLAINT: Metastatic pancreatic carcinoma, daphne PE, L leg DVT.     HPI:     71year old 3   Prochlorperazine Maleate (COMPAZINE) 10 mg tablet Take 1 tablet (10 mg total) by mouth every 6 (six) hours as needed for Nausea.  Disp: 30 tablet Rfl: 3   Ondansetron HCl (ZOFRAN) 8 MG tablet Take 1 tablet (8 mg total) by mouth every 8 (eight) hours as Cholelithiasis. No biliary dilatation. PANCREAS:  Low-density mass within the pancreatic tail measures 2.0 x 2.4 versus 2.4 x 2.8 cm. SPLEEN:  No enlargement or focal lesion. KIDNEYS:  Stable. ADRENALS:  No mass or enlargement.   AORTA/VASCULAR:  No ane BUN 8 7 - 18 mg/dL    Creatinine 0.77 0.70 - 1.30 mg/dL    BUN/CREA Ratio 10.4 10.0 - 20.0    Calcium, Total 9.2 8.5 - 10.1 mg/dL    Calculated Osmolality 299 (H) 275 - 295 mOsm/kg    GFR, Non- 93 >=60    GFR, -American 107 >=60    A on 3/12/19. Restaging after 4 cycles-response. Proceed with cycle # 5. He is aware that disease is incurable. Asked if he wants to be seen closer to home but he is comfortable being treated here.     # L leg DVT/daphne PE: jonel provoked due to bus trip to

## 2019-05-10 ENCOUNTER — OFFICE VISIT (OUTPATIENT)
Dept: HEMATOLOGY/ONCOLOGY | Facility: HOSPITAL | Age: 69
End: 2019-05-10
Attending: INTERNAL MEDICINE
Payer: MEDICARE

## 2019-05-10 DIAGNOSIS — C78.7 LIVER METASTASES (HCC): ICD-10-CM

## 2019-05-10 DIAGNOSIS — C25.9 PANCREATIC CARCINOMA (HCC): Primary | ICD-10-CM

## 2019-05-10 DIAGNOSIS — C78.00 MALIGNANT NEOPLASM METASTATIC TO LUNG, UNSPECIFIED LATERALITY (HCC): ICD-10-CM

## 2019-05-10 PROCEDURE — 96372 THER/PROPH/DIAG INJ SC/IM: CPT

## 2019-05-10 NOTE — PROGRESS NOTES
Education Record    Learner:  Patient    Disease / Diagnosis: Pancreatic Cancer/pump dc    Barriers / Limitations:  None   Comments:    Method:  Brief focused   Comments:    General Topics:  Plan of care reviewed   Comments:    Outcome:  Shows understandin

## 2019-05-22 ENCOUNTER — OFFICE VISIT (OUTPATIENT)
Dept: HEMATOLOGY/ONCOLOGY | Facility: HOSPITAL | Age: 69
End: 2019-05-22
Attending: INTERNAL MEDICINE
Payer: MEDICARE

## 2019-05-22 ENCOUNTER — HOSPITAL ENCOUNTER (OUTPATIENT)
Dept: GENERAL RADIOLOGY | Facility: HOSPITAL | Age: 69
Discharge: HOME | End: 2019-05-22
Attending: INTERNAL MEDICINE
Payer: MEDICARE

## 2019-05-22 ENCOUNTER — TELEPHONE (OUTPATIENT)
Dept: HEMATOLOGY/ONCOLOGY | Facility: HOSPITAL | Age: 69
End: 2019-05-22

## 2019-05-22 VITALS
RESPIRATION RATE: 18 BRPM | DIASTOLIC BLOOD PRESSURE: 71 MMHG | SYSTOLIC BLOOD PRESSURE: 139 MMHG | OXYGEN SATURATION: 98 % | BODY MASS INDEX: 27.21 KG/M2 | HEART RATE: 74 BPM | TEMPERATURE: 98 F | HEIGHT: 68.5 IN | WEIGHT: 181.63 LBS

## 2019-05-22 DIAGNOSIS — C25.9 PANCREATIC CARCINOMA (HCC): Primary | ICD-10-CM

## 2019-05-22 DIAGNOSIS — R53.0 NEOPLASTIC (MALIGNANT) RELATED FATIGUE: ICD-10-CM

## 2019-05-22 DIAGNOSIS — R06.2 WHEEZING: ICD-10-CM

## 2019-05-22 DIAGNOSIS — C78.7 LIVER METASTASES (HCC): ICD-10-CM

## 2019-05-22 DIAGNOSIS — C78.00 MALIGNANT NEOPLASM METASTATIC TO LUNG, UNSPECIFIED LATERALITY (HCC): ICD-10-CM

## 2019-05-22 DIAGNOSIS — R05.9 COUGH: ICD-10-CM

## 2019-05-22 DIAGNOSIS — E87.6 HYPOKALEMIA: ICD-10-CM

## 2019-05-22 DIAGNOSIS — T45.1X5D ADVERSE EFFECT OF CHEMOTHERAPY, SUBSEQUENT ENCOUNTER: ICD-10-CM

## 2019-05-22 DIAGNOSIS — M10.9 ACUTE GOUT INVOLVING TOE OF RIGHT FOOT, UNSPECIFIED CAUSE: ICD-10-CM

## 2019-05-22 DIAGNOSIS — I82.412 ACUTE DEEP VEIN THROMBOSIS (DVT) OF FEMORAL VEIN OF LEFT LOWER EXTREMITY (HCC): ICD-10-CM

## 2019-05-22 DIAGNOSIS — C25.9 PANCREATIC CARCINOMA (HCC): ICD-10-CM

## 2019-05-22 DIAGNOSIS — I26.99 BILATERAL PULMONARY EMBOLISM (HCC): ICD-10-CM

## 2019-05-22 PROCEDURE — 85025 COMPLETE CBC W/AUTO DIFF WBC: CPT

## 2019-05-22 PROCEDURE — 71046 X-RAY EXAM CHEST 2 VIEWS: CPT | Performed by: INTERNAL MEDICINE

## 2019-05-22 PROCEDURE — 96415 CHEMO IV INFUSION ADDL HR: CPT

## 2019-05-22 PROCEDURE — 86301 IMMUNOASSAY TUMOR CA 19-9: CPT

## 2019-05-22 PROCEDURE — 96367 TX/PROPH/DG ADDL SEQ IV INF: CPT

## 2019-05-22 PROCEDURE — 99214 OFFICE O/P EST MOD 30 MIN: CPT | Performed by: INTERNAL MEDICINE

## 2019-05-22 PROCEDURE — 96411 CHEMO IV PUSH ADDL DRUG: CPT

## 2019-05-22 PROCEDURE — 96375 TX/PRO/DX INJ NEW DRUG ADDON: CPT

## 2019-05-22 PROCEDURE — 80053 COMPREHEN METABOLIC PANEL: CPT

## 2019-05-22 PROCEDURE — 96549 UNLISTED CHEMOTHERAPY PX: CPT

## 2019-05-22 PROCEDURE — 96368 THER/DIAG CONCURRENT INF: CPT

## 2019-05-22 PROCEDURE — 96413 CHEMO IV INFUSION 1 HR: CPT

## 2019-05-22 PROCEDURE — 85027 COMPLETE CBC AUTOMATED: CPT

## 2019-05-22 PROCEDURE — 94640 AIRWAY INHALATION TREATMENT: CPT

## 2019-05-22 PROCEDURE — 85007 BL SMEAR W/DIFF WBC COUNT: CPT

## 2019-05-22 RX ORDER — ONDANSETRON 2 MG/ML
8 INJECTION INTRAMUSCULAR; INTRAVENOUS EVERY 6 HOURS PRN
Status: CANCELLED | OUTPATIENT
Start: 2019-05-22

## 2019-05-22 RX ORDER — POTASSIUM CHLORIDE 750 MG/1
40 TABLET, EXTENDED RELEASE ORAL ONCE
Status: CANCELLED
Start: 2019-05-22

## 2019-05-22 RX ORDER — SODIUM CHLORIDE 0.9 % (FLUSH) 0.9 %
10 SYRINGE (ML) INJECTION ONCE
Status: CANCELLED | OUTPATIENT
Start: 2019-05-22

## 2019-05-22 RX ORDER — LORAZEPAM 0.5 MG/1
TABLET ORAL EVERY 4 HOURS PRN
Status: CANCELLED | OUTPATIENT
Start: 2019-05-22

## 2019-05-22 RX ORDER — ALBUTEROL SULFATE 2.5 MG/3ML
2.5 SOLUTION RESPIRATORY (INHALATION) ONCE
Status: CANCELLED
Start: 2019-05-22

## 2019-05-22 RX ORDER — METOCLOPRAMIDE HYDROCHLORIDE 5 MG/ML
10 INJECTION INTRAMUSCULAR; INTRAVENOUS EVERY 6 HOURS PRN
Status: CANCELLED | OUTPATIENT
Start: 2019-05-22

## 2019-05-22 RX ORDER — LORAZEPAM 2 MG/ML
INJECTION INTRAMUSCULAR EVERY 4 HOURS PRN
Status: CANCELLED | OUTPATIENT
Start: 2019-05-22

## 2019-05-22 RX ORDER — FLUOROURACIL 50 MG/ML
2400 INJECTION, SOLUTION INTRAVENOUS CONTINUOUS
Status: CANCELLED | OUTPATIENT
Start: 2019-05-22

## 2019-05-22 RX ORDER — AZITHROMYCIN 250 MG/1
TABLET, FILM COATED ORAL
Qty: 1 PACKAGE | Refills: 0 | Status: SHIPPED | OUTPATIENT
Start: 2019-05-22 | End: 2019-06-05

## 2019-05-22 RX ORDER — SODIUM CHLORIDE 0.9 % (FLUSH) 0.9 %
10 SYRINGE (ML) INJECTION ONCE
Status: DISCONTINUED | OUTPATIENT
Start: 2019-05-22 | End: 2019-05-22

## 2019-05-22 RX ORDER — ATROPINE SULFATE 0.4 MG/ML
0.2 AMPUL (ML) INJECTION ONCE
Status: CANCELLED | OUTPATIENT
Start: 2019-05-22

## 2019-05-22 RX ORDER — FLUOROURACIL 50 MG/ML
2400 INJECTION, SOLUTION INTRAVENOUS CONTINUOUS
Status: DISCONTINUED | OUTPATIENT
Start: 2019-05-22 | End: 2019-05-22

## 2019-05-22 RX ORDER — FLUOROURACIL 50 MG/ML
400 INJECTION, SOLUTION INTRAVENOUS ONCE
Status: CANCELLED | OUTPATIENT
Start: 2019-05-22

## 2019-05-22 RX ORDER — POTASSIUM CHLORIDE 20 MEQ/1
20 TABLET, EXTENDED RELEASE ORAL 2 TIMES DAILY
Qty: 60 TABLET | Refills: 0 | Status: SHIPPED | OUTPATIENT
Start: 2019-05-22 | End: 2019-09-04

## 2019-05-22 RX ORDER — ALBUTEROL SULFATE 2.5 MG/3ML
2.5 SOLUTION RESPIRATORY (INHALATION) ONCE
Status: COMPLETED | OUTPATIENT
Start: 2019-05-22 | End: 2019-05-22

## 2019-05-22 RX ORDER — ATROPINE SULFATE 0.4 MG/ML
0.2 AMPUL (ML) INJECTION ONCE
Status: COMPLETED | OUTPATIENT
Start: 2019-05-22 | End: 2019-05-22

## 2019-05-22 RX ORDER — POTASSIUM CHLORIDE 20 MEQ/1
40 TABLET, EXTENDED RELEASE ORAL ONCE
Status: COMPLETED | OUTPATIENT
Start: 2019-05-22 | End: 2019-05-22

## 2019-05-22 RX ORDER — METHYLPREDNISOLONE 4 MG/1
TABLET ORAL
Qty: 1 PACKAGE | Refills: 0 | Status: SHIPPED | OUTPATIENT
Start: 2019-05-22 | End: 2019-06-05

## 2019-05-22 RX ORDER — GUAIFENESIN 600 MG
1200 TABLET, EXTENDED RELEASE 12 HR ORAL 2 TIMES DAILY
COMMUNITY
End: 2019-06-05

## 2019-05-22 RX ORDER — PROCHLORPERAZINE MALEATE 10 MG
10 TABLET ORAL EVERY 6 HOURS PRN
Status: CANCELLED | OUTPATIENT
Start: 2019-05-22

## 2019-05-22 RX ORDER — FLUOROURACIL 50 MG/ML
400 INJECTION, SOLUTION INTRAVENOUS ONCE
Status: COMPLETED | OUTPATIENT
Start: 2019-05-22 | End: 2019-05-22

## 2019-05-22 RX ADMIN — FLUOROURACIL 800 MG: 50 INJECTION, SOLUTION INTRAVENOUS at 13:34:00

## 2019-05-22 RX ADMIN — ALBUTEROL SULFATE 2.5 MG: 2.5 SOLUTION RESPIRATORY (INHALATION) at 10:33:00

## 2019-05-22 RX ADMIN — ATROPINE SULFATE 0.2 MG: 0.4 MG/ML AMPUL (ML) INJECTION at 11:10:00

## 2019-05-22 RX ADMIN — POTASSIUM CHLORIDE 40 MEQ: 20 TABLET, EXTENDED RELEASE ORAL at 13:23:00

## 2019-05-22 RX ADMIN — FLUOROURACIL 4750 MG: 50 INJECTION, SOLUTION INTRAVENOUS at 13:39:00

## 2019-05-22 NOTE — PROGRESS NOTES
[facility] Progress Note      Patient Name:  Giselle Chahal  YOB: 1950  Medical Record Number:  [id number]    Date of visit:5/22/2019      CHIEF COMPLAINT: Metastatic pancreatic carcinoma, daphne PE, L leg DVT.     HPI:     71year old methylPREDNISolone 4 MG Oral Tablet Therapy Pack Take as directed. Disp: 1 Package Rfl: 0   Potassium Chloride ER 20 MEQ Oral Tab CR Take 1 tablet (20 mEq total) by mouth 2 (two) times daily.  Disp: 60 tablet Rfl: 0   OLANZapine 2.5 MG Oral Tab Take 1-2 t acute psychosocial intervention were identified.      IMAGING:    LABS:     Recent Results (from the past 24 hour(s))   CARBOHYDRATE ANTIGEN 19-9    Collection Time: 05/22/19  9:04 AM   Result Value Ref Range    Carbohydrate Ag 19-9 55,169.4 (H) <=37.0 U/mL Neutrophils % Manual 75 %    Band % 10 %    Lymphocyte % Manual 10 %    Monocyte % Manual 4 %    Eosinophil % Manual 0 %    Basophil % Manual 0 %    Myelocyte % 1 %    Total Cells Counted 100     RBC Morphology Normal Normal, Slide reviewed, see previou Chloride ER 20 MEQ Oral Tab CR 60 tablet 0     Sig: Take 1 tablet (20 mEq total) by mouth 2 (two) times daily. Return in about 2 weeks (around 6/5/2019) for Labs, MD visit, Chemo. Imelda Rowell M.D.     [facility]    [facility]

## 2019-05-22 NOTE — TELEPHONE ENCOUNTER
Tiki Aguilar MD  P [facility] [address]             Call daughter Regis Payton, chest xray-bronchitis, no pneumonia. Start Rx-medrol and z pack, still get chest CT as planned      Spoke to daughter, verbalized understanding.

## 2019-05-22 NOTE — PROGRESS NOTES
Outpatient Oncology Care Plan  Problem list:  right big toe pain, HA, cough, insomnia, fatigue, raised mole to right nose    Problems related to:    disease/disease progression    Interventions:  provided general teaching    Expected outcomes:  adequate sl

## 2019-05-22 NOTE — PROGRESS NOTES
Education Record    Learner:  Patient    Disease / Diagnosis:  Pancreatic cancer    Barriers / Limitations:  None   Comments:    Method:  Brief focused   Comments:    General Topics:  Medication, Procedure and Plan of care reviewed   Comments:    Outcome:

## 2019-05-24 ENCOUNTER — OFFICE VISIT (OUTPATIENT)
Dept: HEMATOLOGY/ONCOLOGY | Facility: HOSPITAL | Age: 69
End: 2019-05-24
Attending: INTERNAL MEDICINE
Payer: MEDICARE

## 2019-05-24 DIAGNOSIS — C25.9 PANCREATIC CARCINOMA (HCC): Primary | ICD-10-CM

## 2019-05-24 DIAGNOSIS — C78.00 MALIGNANT NEOPLASM METASTATIC TO LUNG, UNSPECIFIED LATERALITY (HCC): ICD-10-CM

## 2019-05-24 DIAGNOSIS — C78.7 LIVER METASTASES (HCC): ICD-10-CM

## 2019-05-24 PROCEDURE — 96372 THER/PROPH/DIAG INJ SC/IM: CPT

## 2019-05-29 ENCOUNTER — HOSPITAL ENCOUNTER (OUTPATIENT)
Dept: CT IMAGING | Facility: HOSPITAL | Age: 69
Discharge: HOME | End: 2019-05-29
Attending: INTERNAL MEDICINE
Payer: MEDICARE

## 2019-05-29 DIAGNOSIS — C78.00 MALIGNANT NEOPLASM METASTATIC TO LUNG, UNSPECIFIED LATERALITY (HCC): ICD-10-CM

## 2019-05-29 DIAGNOSIS — C25.9 PANCREATIC CARCINOMA (HCC): ICD-10-CM

## 2019-05-29 DIAGNOSIS — R05.9 COUGH: ICD-10-CM

## 2019-05-29 PROCEDURE — 71260 CT THORAX DX C+: CPT | Performed by: INTERNAL MEDICINE

## 2019-05-31 ENCOUNTER — TELEPHONE (OUTPATIENT)
Dept: HEMATOLOGY/ONCOLOGY | Facility: HOSPITAL | Age: 69
End: 2019-05-31

## 2019-05-31 NOTE — TELEPHONE ENCOUNTER
Spoke to daughter Marium Navarro about CT scan. Suspect some changes related to recent bronchitis. Overall I feel CT scan shows response.

## 2019-06-04 NOTE — PROGRESS NOTES
[facility] Progress Note      Patient Name:  Leida Brown  YOB: 1950  Medical Record Number:  [id number]    Date of visit: 6/5/2019    CHIEF COMPLAINT: Metastatic pancreatic carcinoma, daphne PE, L leg DVT.     HPI:     71year old th (2.5-5 mg total) by mouth nightly. Disp: 60 tablet Rfl: 3   Ascorbic Acid (VITAMIN C) 1000 MG Oral Tab Take 1,000 mg by mouth daily.  Disp:  Rfl:    pancrelipase, Lip-Prot-Amyl, 5000-41428 units Oral Cap DR Particles Take 1 capsule (5,000 Units total) by mo nodules and are consistent with metastatic disease.   A comparison exam is available, but the slice thickness from that comparison exam is 5 mm, comparing with 2 mm for the current exam.  These technique differences limited assessment for interval change in angle has increased in size. Other subcentimeter mediastinal nodules are present, although they have increased in size as well. Largest measuring 7 mm. Partially seen metastatic lesions to the liver.        LABS:     Recent Results (from the past 24 hour Absolute Manual 0.34 0.00 - 0.70 x10(3) uL    Basophil Absolute Manual 0.00 0.00 - 0.20 x10(3) uL    Metamyelocyte Absolute Manual 0.17 (H) 0 x10(3) uL    Myelocyte Absolute Manual 0.50 (H) 0 x10(3) uL    Neutrophils % Manual 51 %    Band % 19 %    Lymphoc [address], [place], [id number]    6/5/2019

## 2019-06-05 ENCOUNTER — OFFICE VISIT (OUTPATIENT)
Dept: HEMATOLOGY/ONCOLOGY | Facility: HOSPITAL | Age: 69
End: 2019-06-05
Attending: INTERNAL MEDICINE
Payer: MEDICARE

## 2019-06-05 ENCOUNTER — NURSE NAVIGATOR ENCOUNTER (OUTPATIENT)
Dept: HEMATOLOGY/ONCOLOGY | Facility: HOSPITAL | Age: 69
End: 2019-06-05

## 2019-06-05 VITALS
OXYGEN SATURATION: 98 % | SYSTOLIC BLOOD PRESSURE: 131 MMHG | HEART RATE: 61 BPM | TEMPERATURE: 97 F | RESPIRATION RATE: 18 BRPM | WEIGHT: 181.38 LBS | HEIGHT: 68.5 IN | BODY MASS INDEX: 27.17 KG/M2 | DIASTOLIC BLOOD PRESSURE: 80 MMHG

## 2019-06-05 DIAGNOSIS — E87.6 HYPOKALEMIA: ICD-10-CM

## 2019-06-05 DIAGNOSIS — T45.1X5D ADVERSE EFFECT OF CHEMOTHERAPY, SUBSEQUENT ENCOUNTER: ICD-10-CM

## 2019-06-05 DIAGNOSIS — I26.99 BILATERAL PULMONARY EMBOLISM (HCC): ICD-10-CM

## 2019-06-05 DIAGNOSIS — R05.9 COUGH: ICD-10-CM

## 2019-06-05 DIAGNOSIS — C78.7 LIVER METASTASES (HCC): ICD-10-CM

## 2019-06-05 DIAGNOSIS — I82.412 ACUTE DEEP VEIN THROMBOSIS (DVT) OF FEMORAL VEIN OF LEFT LOWER EXTREMITY (HCC): ICD-10-CM

## 2019-06-05 DIAGNOSIS — C25.9 PANCREATIC CARCINOMA (HCC): Primary | ICD-10-CM

## 2019-06-05 DIAGNOSIS — C78.00 MALIGNANT NEOPLASM METASTATIC TO LUNG, UNSPECIFIED LATERALITY (HCC): ICD-10-CM

## 2019-06-05 DIAGNOSIS — R11.0 NAUSEA IN ADULT: ICD-10-CM

## 2019-06-05 DIAGNOSIS — R53.0 NEOPLASTIC (MALIGNANT) RELATED FATIGUE: ICD-10-CM

## 2019-06-05 PROCEDURE — 86301 IMMUNOASSAY TUMOR CA 19-9: CPT

## 2019-06-05 PROCEDURE — 85007 BL SMEAR W/DIFF WBC COUNT: CPT

## 2019-06-05 PROCEDURE — 85025 COMPLETE CBC W/AUTO DIFF WBC: CPT

## 2019-06-05 PROCEDURE — 85027 COMPLETE CBC AUTOMATED: CPT

## 2019-06-05 PROCEDURE — 99214 OFFICE O/P EST MOD 30 MIN: CPT | Performed by: INTERNAL MEDICINE

## 2019-06-05 PROCEDURE — 36591 DRAW BLOOD OFF VENOUS DEVICE: CPT

## 2019-06-05 PROCEDURE — 80053 COMPREHEN METABOLIC PANEL: CPT

## 2019-06-05 NOTE — PROGRESS NOTES
Per MD, no treatment today - will return next week for cll, apn, chemo & genetics appt. appt made, as well as next treatment 2 weeks from there per daughter request. Patient and daughter going up to [facility] to receive appts. Port needle flushed and deaccessed.

## 2019-06-05 NOTE — PROGRESS NOTES
Outpatient Oncology Care Plan  Problem list:  loss of appetite  thrombocytopenia  fatigue  knowledge deficit  nutrition  weight loss  weakness    Problems related to:    chemotherapy  disease/disease progression    Interventions:  provided general teaching

## 2019-06-10 ENCOUNTER — TELEPHONE (OUTPATIENT)
Dept: FAMILY MEDICINE CLINIC | Facility: CLINIC | Age: 69
End: 2019-06-10

## 2019-06-12 ENCOUNTER — GENETICS ENCOUNTER (OUTPATIENT)
Dept: GENETICS | Facility: HOSPITAL | Age: 69
End: 2019-06-12
Attending: INTERNAL MEDICINE
Payer: MEDICARE

## 2019-06-12 ENCOUNTER — OFFICE VISIT (OUTPATIENT)
Dept: HEMATOLOGY/ONCOLOGY | Facility: HOSPITAL | Age: 69
End: 2019-06-12
Attending: INTERNAL MEDICINE
Payer: MEDICARE

## 2019-06-12 VITALS
WEIGHT: 183.38 LBS | OXYGEN SATURATION: 99 % | SYSTOLIC BLOOD PRESSURE: 126 MMHG | RESPIRATION RATE: 18 BRPM | TEMPERATURE: 97 F | HEART RATE: 60 BPM | DIASTOLIC BLOOD PRESSURE: 73 MMHG | BODY MASS INDEX: 27 KG/M2

## 2019-06-12 DIAGNOSIS — I26.99 BILATERAL PULMONARY EMBOLISM (HCC): ICD-10-CM

## 2019-06-12 DIAGNOSIS — R53.0 NEOPLASTIC (MALIGNANT) RELATED FATIGUE: ICD-10-CM

## 2019-06-12 DIAGNOSIS — C25.9 PANCREATIC CARCINOMA (HCC): Primary | ICD-10-CM

## 2019-06-12 DIAGNOSIS — C78.00 MALIGNANT NEOPLASM METASTATIC TO LUNG, UNSPECIFIED LATERALITY (HCC): ICD-10-CM

## 2019-06-12 DIAGNOSIS — C78.7 LIVER METASTASES (HCC): ICD-10-CM

## 2019-06-12 DIAGNOSIS — T45.1X5A CHEMOTHERAPY-INDUCED NAUSEA: ICD-10-CM

## 2019-06-12 DIAGNOSIS — Z51.11 ENCOUNTER FOR CHEMOTHERAPY MANAGEMENT: ICD-10-CM

## 2019-06-12 DIAGNOSIS — R11.0 CHEMOTHERAPY-INDUCED NAUSEA: ICD-10-CM

## 2019-06-12 PROCEDURE — 99215 OFFICE O/P EST HI 40 MIN: CPT | Performed by: CLINICAL NURSE SPECIALIST

## 2019-06-12 PROCEDURE — 96375 TX/PRO/DX INJ NEW DRUG ADDON: CPT

## 2019-06-12 PROCEDURE — 96411 CHEMO IV PUSH ADDL DRUG: CPT

## 2019-06-12 PROCEDURE — 96368 THER/DIAG CONCURRENT INF: CPT

## 2019-06-12 PROCEDURE — 85025 COMPLETE CBC W/AUTO DIFF WBC: CPT

## 2019-06-12 PROCEDURE — 80053 COMPREHEN METABOLIC PANEL: CPT

## 2019-06-12 PROCEDURE — 96415 CHEMO IV INFUSION ADDL HR: CPT

## 2019-06-12 PROCEDURE — 96413 CHEMO IV INFUSION 1 HR: CPT

## 2019-06-12 PROCEDURE — 96367 TX/PROPH/DG ADDL SEQ IV INF: CPT

## 2019-06-12 PROCEDURE — 96549 UNLISTED CHEMOTHERAPY PX: CPT

## 2019-06-12 RX ORDER — FLUOROURACIL 50 MG/ML
400 INJECTION, SOLUTION INTRAVENOUS ONCE
Status: COMPLETED | OUTPATIENT
Start: 2019-06-12 | End: 2019-06-12

## 2019-06-12 RX ORDER — LORAZEPAM 0.5 MG/1
TABLET ORAL EVERY 4 HOURS PRN
Status: CANCELLED | OUTPATIENT
Start: 2019-06-12

## 2019-06-12 RX ORDER — ATROPINE SULFATE 0.4 MG/ML
0.2 AMPUL (ML) INJECTION ONCE
Status: CANCELLED | OUTPATIENT
Start: 2019-06-12

## 2019-06-12 RX ORDER — METOCLOPRAMIDE HYDROCHLORIDE 5 MG/ML
10 INJECTION INTRAMUSCULAR; INTRAVENOUS EVERY 6 HOURS PRN
Status: CANCELLED | OUTPATIENT
Start: 2019-06-12

## 2019-06-12 RX ORDER — ONDANSETRON 2 MG/ML
8 INJECTION INTRAMUSCULAR; INTRAVENOUS EVERY 6 HOURS PRN
Status: CANCELLED | OUTPATIENT
Start: 2019-06-12

## 2019-06-12 RX ORDER — LORAZEPAM 2 MG/ML
INJECTION INTRAMUSCULAR EVERY 4 HOURS PRN
Status: CANCELLED | OUTPATIENT
Start: 2019-06-12

## 2019-06-12 RX ORDER — FLUOROURACIL 50 MG/ML
400 INJECTION, SOLUTION INTRAVENOUS ONCE
Status: CANCELLED | OUTPATIENT
Start: 2019-06-12

## 2019-06-12 RX ORDER — FLUOROURACIL 50 MG/ML
2000 INJECTION, SOLUTION INTRAVENOUS CONTINUOUS
Status: CANCELLED | OUTPATIENT
Start: 2019-06-12

## 2019-06-12 RX ORDER — FLUOROURACIL 50 MG/ML
2000 INJECTION, SOLUTION INTRAVENOUS CONTINUOUS
Status: DISCONTINUED | OUTPATIENT
Start: 2019-06-12 | End: 2019-06-12

## 2019-06-12 RX ORDER — ATROPINE SULFATE 0.4 MG/ML
0.2 AMPUL (ML) INJECTION ONCE
Status: COMPLETED | OUTPATIENT
Start: 2019-06-12 | End: 2019-06-12

## 2019-06-12 RX ORDER — PROCHLORPERAZINE MALEATE 10 MG
10 TABLET ORAL EVERY 6 HOURS PRN
Status: CANCELLED | OUTPATIENT
Start: 2019-06-12

## 2019-06-12 RX ADMIN — ATROPINE SULFATE 0.2 MG: 0.4 MG/ML AMPUL (ML) INJECTION at 11:15:00

## 2019-06-12 RX ADMIN — FLUOROURACIL 800 MG: 50 INJECTION, SOLUTION INTRAVENOUS at 13:34:00

## 2019-06-12 RX ADMIN — FLUOROURACIL 3950 MG: 50 INJECTION, SOLUTION INTRAVENOUS at 13:42:00

## 2019-06-12 NOTE — PROGRESS NOTES
Referring Provider:  Dr. Hodan Day    Reason for Referral:  Mr. Virgil Felty was referred for genetic counseling because of a personal history of pancreatic cancer. Mr. Veronica Booker is a 71year-old man of [place] and [place] descent.       Medical Histo meets NCCN guidelines for genetic testing for BRCA1/2. Genetic Testing (Panel): We discussed the option of pursuing genetic testing.    The pros, cons, and limitations of genetic testing were discussed including the potential implications of test genetic testing at a reduced cost to clarify their cancer risks. Genetic test results have implications for the entire biological family.  Thus, it is recommended that he share the genetic test results with his biological family members so that they may hav

## 2019-06-12 NOTE — PROGRESS NOTES
Pt here for C7D1.   Arrives Ambulating independently, accompanied by Family member           Modifications in dose or schedule: Dose reduction     Frequency of blood return and site check throughout administration: Prior to administration, Prior to each shobha

## 2019-06-12 NOTE — PROGRESS NOTES
Pt here for APN and C7 chemo. Per dtr, he feels tired week of chemo, poor taste and poor appetite. Using medical marijuana with good relief. See tox.

## 2019-06-12 NOTE — PROGRESS NOTES
ANP Visit Note    Patient Name: Adria Worley   YOB: 1950   Medical Record Number: [id number]   CSN: [id number]   Date of visit: 6/12/2019       Chief Complaint/Reason for Visit:  Metastatic Pancreatic Carcinoma, History bilateral PE/DVT     O Laterality Date   • OTHER SURGICAL HISTORY      minor surgery on right upper leg (? cyst)       Allergies:  No Known Allergies    Family History:  Family History   Problem Relation Age of Onset   • Other (Other) Brother         in a wheel chair for back pr file      Medications:    Current Outpatient Medications:   •  NON FORMULARY,  , Disp: , Rfl:   •  Potassium Chloride ER 20 MEQ Oral Tab CR, Take 1 tablet (20 mEq total) by mouth 2 (two) times daily. , Disp: 60 tablet, Rfl: 0  •  OLANZapine 2.5 MG Oral Tab, Lymphatics: There is no palpable lymphadenopathy throughout in the cervical,supraclavicular, axillary, or inguinal regions. Psych/Depression: mood and affect are appropriate.      Labs:        Recent Results (from the past 72 hour(s))   COMP METABOLIC PA RBC MORPHOLOGY SCAN    Collection Time: 06/12/19  8:46 AM   Result Value Ref Range    RBC Morphology Normal Normal, Slide reviewed, see previous RBC morphology. Platelet Morphology Normal Normal       Impression/Plan:  1.  Metastatic Pancreatic Carcino

## 2019-06-14 ENCOUNTER — OFFICE VISIT (OUTPATIENT)
Dept: HEMATOLOGY/ONCOLOGY | Facility: HOSPITAL | Age: 69
End: 2019-06-14
Attending: INTERNAL MEDICINE
Payer: MEDICARE

## 2019-06-14 DIAGNOSIS — C78.00 MALIGNANT NEOPLASM METASTATIC TO LUNG, UNSPECIFIED LATERALITY (HCC): ICD-10-CM

## 2019-06-14 DIAGNOSIS — C25.9 PANCREATIC CARCINOMA (HCC): Primary | ICD-10-CM

## 2019-06-14 DIAGNOSIS — C78.7 LIVER METASTASES (HCC): ICD-10-CM

## 2019-06-14 PROCEDURE — 96372 THER/PROPH/DIAG INJ SC/IM: CPT

## 2019-06-14 NOTE — PROGRESS NOTES
Patient complaining of constipation. No bowel movement today, states dis go yesterday. Instructed patient to start miralax and call office is no results. Patient states understanding.       Education Record    Learner:  Patient and Spouse    Disease / Loreto Horta

## 2019-06-19 ENCOUNTER — APPOINTMENT (OUTPATIENT)
Dept: HEMATOLOGY/ONCOLOGY | Facility: HOSPITAL | Age: 69
End: 2019-06-19
Attending: INTERNAL MEDICINE
Payer: MEDICARE

## 2019-06-26 ENCOUNTER — OFFICE VISIT (OUTPATIENT)
Dept: HEMATOLOGY/ONCOLOGY | Facility: HOSPITAL | Age: 69
End: 2019-06-26
Attending: INTERNAL MEDICINE
Payer: MEDICARE

## 2019-06-26 ENCOUNTER — GENETICS ENCOUNTER (OUTPATIENT)
Dept: HEMATOLOGY/ONCOLOGY | Facility: HOSPITAL | Age: 69
End: 2019-06-26

## 2019-06-26 VITALS
SYSTOLIC BLOOD PRESSURE: 151 MMHG | TEMPERATURE: 98 F | RESPIRATION RATE: 18 BRPM | OXYGEN SATURATION: 98 % | HEIGHT: 68.5 IN | DIASTOLIC BLOOD PRESSURE: 81 MMHG | WEIGHT: 184 LBS | BODY MASS INDEX: 27.57 KG/M2 | HEART RATE: 62 BPM

## 2019-06-26 DIAGNOSIS — T45.1X5D ADVERSE EFFECT OF CHEMOTHERAPY, SUBSEQUENT ENCOUNTER: ICD-10-CM

## 2019-06-26 DIAGNOSIS — R11.0 CHEMOTHERAPY-INDUCED NAUSEA: ICD-10-CM

## 2019-06-26 DIAGNOSIS — C78.7 LIVER METASTASES (HCC): ICD-10-CM

## 2019-06-26 DIAGNOSIS — C78.00 MALIGNANT NEOPLASM METASTATIC TO LUNG, UNSPECIFIED LATERALITY (HCC): ICD-10-CM

## 2019-06-26 DIAGNOSIS — Z51.11 ENCOUNTER FOR CHEMOTHERAPY MANAGEMENT: ICD-10-CM

## 2019-06-26 DIAGNOSIS — C25.9 PANCREATIC CARCINOMA (HCC): Primary | ICD-10-CM

## 2019-06-26 DIAGNOSIS — E87.6 HYPOKALEMIA: ICD-10-CM

## 2019-06-26 DIAGNOSIS — I26.99 BILATERAL PULMONARY EMBOLISM (HCC): ICD-10-CM

## 2019-06-26 DIAGNOSIS — T45.1X5A CHEMOTHERAPY-INDUCED NAUSEA: ICD-10-CM

## 2019-06-26 PROCEDURE — 86301 IMMUNOASSAY TUMOR CA 19-9: CPT

## 2019-06-26 PROCEDURE — 80053 COMPREHEN METABOLIC PANEL: CPT

## 2019-06-26 PROCEDURE — 96413 CHEMO IV INFUSION 1 HR: CPT

## 2019-06-26 PROCEDURE — 96375 TX/PRO/DX INJ NEW DRUG ADDON: CPT

## 2019-06-26 PROCEDURE — 99214 OFFICE O/P EST MOD 30 MIN: CPT | Performed by: INTERNAL MEDICINE

## 2019-06-26 PROCEDURE — 96367 TX/PROPH/DG ADDL SEQ IV INF: CPT

## 2019-06-26 PROCEDURE — 96411 CHEMO IV PUSH ADDL DRUG: CPT

## 2019-06-26 PROCEDURE — 96368 THER/DIAG CONCURRENT INF: CPT

## 2019-06-26 PROCEDURE — 85025 COMPLETE CBC W/AUTO DIFF WBC: CPT

## 2019-06-26 RX ORDER — METOCLOPRAMIDE HYDROCHLORIDE 5 MG/ML
10 INJECTION INTRAMUSCULAR; INTRAVENOUS EVERY 6 HOURS PRN
Status: CANCELLED | OUTPATIENT
Start: 2019-06-26

## 2019-06-26 RX ORDER — ATROPINE SULFATE 0.4 MG/ML
0.2 AMPUL (ML) INJECTION ONCE
Status: COMPLETED | OUTPATIENT
Start: 2019-06-26 | End: 2019-06-26

## 2019-06-26 RX ORDER — FLUOROURACIL 50 MG/ML
400 INJECTION, SOLUTION INTRAVENOUS ONCE
Status: CANCELLED | OUTPATIENT
Start: 2019-06-26

## 2019-06-26 RX ORDER — FLUOROURACIL 50 MG/ML
2400 INJECTION, SOLUTION INTRAVENOUS CONTINUOUS
Status: DISCONTINUED | OUTPATIENT
Start: 2019-06-26 | End: 2019-06-26

## 2019-06-26 RX ORDER — FLUOROURACIL 50 MG/ML
400 INJECTION, SOLUTION INTRAVENOUS ONCE
Status: COMPLETED | OUTPATIENT
Start: 2019-06-26 | End: 2019-06-26

## 2019-06-26 RX ORDER — LORAZEPAM 0.5 MG/1
TABLET ORAL EVERY 4 HOURS PRN
Status: CANCELLED | OUTPATIENT
Start: 2019-06-26

## 2019-06-26 RX ORDER — PROCHLORPERAZINE MALEATE 10 MG
10 TABLET ORAL EVERY 6 HOURS PRN
Status: CANCELLED | OUTPATIENT
Start: 2019-06-26

## 2019-06-26 RX ORDER — LORAZEPAM 2 MG/ML
INJECTION INTRAMUSCULAR EVERY 4 HOURS PRN
Status: CANCELLED | OUTPATIENT
Start: 2019-06-26

## 2019-06-26 RX ORDER — ONDANSETRON 2 MG/ML
8 INJECTION INTRAMUSCULAR; INTRAVENOUS EVERY 6 HOURS PRN
Status: CANCELLED | OUTPATIENT
Start: 2019-06-26

## 2019-06-26 RX ORDER — ATROPINE SULFATE 0.4 MG/ML
0.2 AMPUL (ML) INJECTION ONCE
Status: CANCELLED | OUTPATIENT
Start: 2019-06-26

## 2019-06-26 RX ORDER — FLUOROURACIL 50 MG/ML
2400 INJECTION, SOLUTION INTRAVENOUS CONTINUOUS
Status: CANCELLED | OUTPATIENT
Start: 2019-06-26

## 2019-06-26 RX ADMIN — FLUOROURACIL 4750 MG: 50 INJECTION, SOLUTION INTRAVENOUS at 13:29:00

## 2019-06-26 RX ADMIN — ATROPINE SULFATE 0.2 MG: 0.4 MG/ML AMPUL (ML) INJECTION at 11:42:00

## 2019-06-26 RX ADMIN — FLUOROURACIL 800 MG: 50 INJECTION, SOLUTION INTRAVENOUS at 13:22:00

## 2019-06-26 NOTE — PROGRESS NOTES
[facility] Progress Note      Patient Name:  Layne Armendariz  YOB: 1950  Medical Record Number:  [id number]    Date of visit: 6/26/2019    CHIEF COMPLAINT: Metastatic pancreatic carcinoma, daphne PE, L leg DVT.     HPI:     71year old t Take by mouth. Disp:  Rfl:    Cyanocobalamin (B-12 OR) Take by mouth. Disp:  Rfl:    NON FORMULARY  Disp:  Rfl:    NON FORMULARY  Disp:  Rfl:    NON FORMULARY  Disp:  Rfl:    OLANZapine 2.5 MG Oral Tab Take 1-2 tablets (2.5-5 mg total) by mouth nightly.  Breann Mccall IMAGING:        LABS:     Recent Results (from the past 24 hour(s))   COMP METABOLIC PANEL (14)    Collection Time: 06/26/19  9:17 AM   Result Value Ref Range    Glucose 164 (H) 70 - 99 mg/dL    Sodium 142 136 - 145 mmol/L    Potassium 3.7 3.5 - 5.1 mm previous RBC morphology. Platelet Morphology Normal Normal    Microcytosis 1+      Macrocytosis 1+         ASSESSMENT AND PLAN:     # Thrombocytopenia: Due to chemotherapy. Chemotherapy delayed 1 week last time. Drop Oxaliplatin for now.     # Cough: r

## 2019-06-26 NOTE — PROGRESS NOTES
Referring Provider:  Dr. Alonso Seay    Reason for Referral:  Mr. Giselle Patel had [facility] panel genetic testing performed on 06/12/2019 because of a personal history of pancreatic cancer.      Genetic Testing Result:  No mutation was found in

## 2019-06-26 NOTE — PROGRESS NOTES
Pt here for C8D1. Arrives Ambulating independently, accompanied by Family member           Modifications in dose or schedule: Yes - Oxaliplatin dropped from treatment plan. Irinotecan and 5FU pump previously dose reduced.  However, now that oxaliplatin is

## 2019-06-28 ENCOUNTER — OFFICE VISIT (OUTPATIENT)
Dept: HEMATOLOGY/ONCOLOGY | Facility: HOSPITAL | Age: 69
End: 2019-06-28
Attending: INTERNAL MEDICINE
Payer: MEDICARE

## 2019-06-28 DIAGNOSIS — R06.2 WHEEZING: ICD-10-CM

## 2019-06-28 DIAGNOSIS — C78.00 MALIGNANT NEOPLASM METASTATIC TO LUNG, UNSPECIFIED LATERALITY (HCC): ICD-10-CM

## 2019-06-28 DIAGNOSIS — C78.7 LIVER METASTASES (HCC): ICD-10-CM

## 2019-06-28 DIAGNOSIS — C25.9 PANCREATIC CARCINOMA (HCC): Primary | ICD-10-CM

## 2019-06-28 PROCEDURE — 96372 THER/PROPH/DIAG INJ SC/IM: CPT

## 2019-06-28 RX ORDER — SODIUM CHLORIDE 0.9 % (FLUSH) 0.9 %
10 SYRINGE (ML) INJECTION ONCE
Status: COMPLETED | OUTPATIENT
Start: 2019-06-28 | End: 2019-06-28

## 2019-06-28 RX ORDER — POTASSIUM CHLORIDE 20 MEQ/1
20 TABLET, EXTENDED RELEASE ORAL 2 TIMES DAILY
Qty: 60 TABLET | Refills: 0 | OUTPATIENT
Start: 2019-06-28

## 2019-06-28 RX ORDER — POTASSIUM CHLORIDE 20 MEQ/1
TABLET, EXTENDED RELEASE ORAL
Qty: 60 TABLET | Refills: 0 | OUTPATIENT
Start: 2019-06-28

## 2019-06-28 RX ORDER — SODIUM CHLORIDE 0.9 % (FLUSH) 0.9 %
10 SYRINGE (ML) INJECTION ONCE
Status: CANCELLED | OUTPATIENT
Start: 2019-06-28

## 2019-06-28 RX ADMIN — SODIUM CHLORIDE 0.9 % (FLUSH) 10 ML: 0.9 % SYRINGE (ML) INJECTION at 11:00:00

## 2019-06-28 NOTE — PROGRESS NOTES
Education Record    Learner:  Patient and Spouse    Disease / Castillo Pin disconnect/fulphila injection    Barriers / Limitations:  Communication barrier   Comments:    Method:  Discussion and  utilized   Comments:    General Topics:  Procedu

## 2019-07-10 ENCOUNTER — OFFICE VISIT (OUTPATIENT)
Dept: HEMATOLOGY/ONCOLOGY | Facility: HOSPITAL | Age: 69
End: 2019-07-10
Attending: INTERNAL MEDICINE
Payer: MEDICARE

## 2019-07-10 VITALS
WEIGHT: 188.5 LBS | RESPIRATION RATE: 18 BRPM | OXYGEN SATURATION: 98 % | HEART RATE: 58 BPM | TEMPERATURE: 97 F | SYSTOLIC BLOOD PRESSURE: 132 MMHG | DIASTOLIC BLOOD PRESSURE: 77 MMHG | BODY MASS INDEX: 28 KG/M2

## 2019-07-10 DIAGNOSIS — C78.7 LIVER METASTASES (HCC): ICD-10-CM

## 2019-07-10 DIAGNOSIS — Z51.11 ENCOUNTER FOR CHEMOTHERAPY MANAGEMENT: ICD-10-CM

## 2019-07-10 DIAGNOSIS — C78.00 MALIGNANT NEOPLASM METASTATIC TO LUNG, UNSPECIFIED LATERALITY (HCC): ICD-10-CM

## 2019-07-10 DIAGNOSIS — Z86.718 HISTORY OF DVT (DEEP VEIN THROMBOSIS): ICD-10-CM

## 2019-07-10 DIAGNOSIS — C25.9 PANCREATIC CARCINOMA (HCC): Primary | ICD-10-CM

## 2019-07-10 DIAGNOSIS — R60.0 LEG EDEMA, LEFT: Primary | ICD-10-CM

## 2019-07-10 DIAGNOSIS — C25.9 PANCREATIC CARCINOMA (HCC): ICD-10-CM

## 2019-07-10 DIAGNOSIS — T45.1X5D CHEMOTHERAPY ADVERSE REACTION, SUBSEQUENT ENCOUNTER: ICD-10-CM

## 2019-07-10 DIAGNOSIS — R53.83 FATIGUE DUE TO TREATMENT: ICD-10-CM

## 2019-07-10 LAB
ALBUMIN SERPL-MCNC: 3.6 G/DL (ref 3.4–5)
ALBUMIN/GLOB SERPL: 1.1 {RATIO} (ref 1–2)
ALP LIVER SERPL-CCNC: 298 U/L (ref 45–117)
ALT SERPL-CCNC: 42 U/L (ref 16–61)
ANION GAP SERPL CALC-SCNC: 8 MMOL/L (ref 0–18)
AST SERPL-CCNC: 32 U/L (ref 15–37)
BASOPHILS # BLD: 0 X10(3) UL (ref 0–0.2)
BASOPHILS NFR BLD: 0 %
BILIRUB SERPL-MCNC: 0.3 MG/DL (ref 0.1–2)
BUN BLD-MCNC: 14 MG/DL (ref 7–18)
BUN/CREAT SERPL: 17.5 (ref 10–20)
CALCIUM BLD-MCNC: 8.8 MG/DL (ref 8.5–10.1)
CANCER AG19-9 SERPL-ACNC: ABNORMAL U/ML (ref ?–37)
CHLORIDE SERPL-SCNC: 108 MMOL/L (ref 98–112)
CO2 SERPL-SCNC: 26 MMOL/L (ref 21–32)
CREAT BLD-MCNC: 0.8 MG/DL (ref 0.7–1.3)
DEPRECATED RDW RBC AUTO: 68 FL (ref 35.1–46.3)
EOSINOPHIL # BLD: 0.28 X10(3) UL (ref 0–0.7)
EOSINOPHIL NFR BLD: 2 %
ERYTHROCYTE [DISTWIDTH] IN BLOOD BY AUTOMATED COUNT: 18.1 % (ref 11–15)
GLOBULIN PLAS-MCNC: 3.4 G/DL (ref 2.8–4.4)
GLUCOSE BLD-MCNC: 151 MG/DL (ref 70–99)
HCT VFR BLD AUTO: 38.3 % (ref 39–53)
HGB BLD-MCNC: 12.3 G/DL (ref 13–17.5)
LYMPHOCYTES NFR BLD: 1.82 X10(3) UL (ref 1–4)
LYMPHOCYTES NFR BLD: 13 %
M PROTEIN MFR SERPL ELPH: 7 G/DL (ref 6.4–8.2)
MCH RBC QN AUTO: 32.5 PG (ref 26–34)
MCHC RBC AUTO-ENTMCNC: 32.1 G/DL (ref 31–37)
MCV RBC AUTO: 101.3 FL (ref 80–100)
METAMYELOCYTES # BLD: 0.14 X10(3) UL
METAMYELOCYTES NFR BLD: 1 %
MONOCYTES # BLD: 0.84 X10(3) UL (ref 0.1–1)
MONOCYTES NFR BLD: 6 %
NEUTROPHILS # BLD AUTO: 9.96 X10 (3) UL (ref 1.5–7.7)
NEUTROPHILS NFR BLD: 67 %
NEUTS BAND NFR BLD: 11 %
NEUTS HYPERSEG # BLD: 10.92 X10(3) UL (ref 1.5–7.7)
OSMOLALITY SERPL CALC.SUM OF ELEC: 297 MOSM/KG (ref 275–295)
PLATELET # BLD AUTO: 132 10(3)UL (ref 150–450)
PLATELET MORPHOLOGY: NORMAL
POTASSIUM SERPL-SCNC: 3.8 MMOL/L (ref 3.5–5.1)
RBC # BLD AUTO: 3.78 X10(6)UL (ref 3.8–5.8)
SODIUM SERPL-SCNC: 142 MMOL/L (ref 136–145)
TOTAL CELLS COUNTED: 100
WBC # BLD AUTO: 14 X10(3) UL (ref 4–11)

## 2019-07-10 PROCEDURE — 85027 COMPLETE CBC AUTOMATED: CPT

## 2019-07-10 PROCEDURE — 96367 TX/PROPH/DG ADDL SEQ IV INF: CPT

## 2019-07-10 PROCEDURE — 96413 CHEMO IV INFUSION 1 HR: CPT

## 2019-07-10 PROCEDURE — 86301 IMMUNOASSAY TUMOR CA 19-9: CPT

## 2019-07-10 PROCEDURE — 96368 THER/DIAG CONCURRENT INF: CPT

## 2019-07-10 PROCEDURE — 96411 CHEMO IV PUSH ADDL DRUG: CPT

## 2019-07-10 PROCEDURE — 80053 COMPREHEN METABOLIC PANEL: CPT

## 2019-07-10 PROCEDURE — 96375 TX/PRO/DX INJ NEW DRUG ADDON: CPT

## 2019-07-10 PROCEDURE — 99215 OFFICE O/P EST HI 40 MIN: CPT | Performed by: CLINICAL NURSE SPECIALIST

## 2019-07-10 PROCEDURE — 85025 COMPLETE CBC W/AUTO DIFF WBC: CPT

## 2019-07-10 PROCEDURE — 85007 BL SMEAR W/DIFF WBC COUNT: CPT

## 2019-07-10 RX ORDER — LORAZEPAM 2 MG/ML
INJECTION INTRAMUSCULAR EVERY 4 HOURS PRN
Status: CANCELLED | OUTPATIENT
Start: 2019-07-10

## 2019-07-10 RX ORDER — FLUOROURACIL 50 MG/ML
400 INJECTION, SOLUTION INTRAVENOUS ONCE
Status: CANCELLED | OUTPATIENT
Start: 2019-07-10

## 2019-07-10 RX ORDER — ATROPINE SULFATE 0.4 MG/ML
0.2 AMPUL (ML) INJECTION ONCE
Status: CANCELLED | OUTPATIENT
Start: 2019-07-10

## 2019-07-10 RX ORDER — ONDANSETRON 2 MG/ML
8 INJECTION INTRAMUSCULAR; INTRAVENOUS EVERY 6 HOURS PRN
Status: CANCELLED | OUTPATIENT
Start: 2019-07-10

## 2019-07-10 RX ORDER — FLUOROURACIL 50 MG/ML
2400 INJECTION, SOLUTION INTRAVENOUS CONTINUOUS
Status: DISCONTINUED | OUTPATIENT
Start: 2019-07-10 | End: 2019-07-10

## 2019-07-10 RX ORDER — PROCHLORPERAZINE MALEATE 10 MG
10 TABLET ORAL EVERY 6 HOURS PRN
Status: CANCELLED | OUTPATIENT
Start: 2019-07-10

## 2019-07-10 RX ORDER — LORAZEPAM 0.5 MG/1
TABLET ORAL EVERY 4 HOURS PRN
Status: CANCELLED | OUTPATIENT
Start: 2019-07-10

## 2019-07-10 RX ORDER — METOCLOPRAMIDE HYDROCHLORIDE 5 MG/ML
10 INJECTION INTRAMUSCULAR; INTRAVENOUS EVERY 6 HOURS PRN
Status: CANCELLED | OUTPATIENT
Start: 2019-07-10

## 2019-07-10 RX ORDER — ATROPINE SULFATE 0.4 MG/ML
0.2 AMPUL (ML) INJECTION ONCE
Status: COMPLETED | OUTPATIENT
Start: 2019-07-10 | End: 2019-07-10

## 2019-07-10 RX ORDER — FLUOROURACIL 50 MG/ML
2400 INJECTION, SOLUTION INTRAVENOUS CONTINUOUS
Status: CANCELLED | OUTPATIENT
Start: 2019-07-10

## 2019-07-10 RX ORDER — FLUOROURACIL 50 MG/ML
400 INJECTION, SOLUTION INTRAVENOUS ONCE
Status: COMPLETED | OUTPATIENT
Start: 2019-07-10 | End: 2019-07-10

## 2019-07-10 RX ADMIN — FLUOROURACIL 4750 MG: 50 INJECTION, SOLUTION INTRAVENOUS at 13:22:00

## 2019-07-10 RX ADMIN — FLUOROURACIL 800 MG: 50 INJECTION, SOLUTION INTRAVENOUS at 13:17:00

## 2019-07-10 RX ADMIN — ATROPINE SULFATE 0.2 MG: 0.4 MG/ML AMPUL (ML) INJECTION at 11:26:00

## 2019-07-10 NOTE — PROGRESS NOTES
Pt here for C9D1.   Arrives Ambulating independently, accompanied by Self and Family member           Modifications in dose or schedule: No     Frequency of blood return and site check throughout administration: Prior to administration   Discharged to Home,

## 2019-07-10 NOTE — PROGRESS NOTES
[facility]    Patient Name: Skyla Purvis   YOB: 1950   Medical Record Number: [id number]   CSN: [id number]   Date of visit: 7/10/2019  Provider: SWETHA Garcia  Referring Physician: No ref.  provider found    Problem Eats/drinks adequately.      Allergies:  No Known Allergies    Vital Signs:  Height: --  Weight: 85.5 kg (188 lb 8 oz) (07/10 0935)  BSA (Calculated - sq m): --  Pulse: 58 (07/10 0935)  BP: 132/77 (07/10 0935)  Temp: 97.2 °F (36.2 °C) (07/10 0935)  Do Not U to auscultation bilaterally  CV:  Regular rate and rhythm  Abdomen:  Non-distended, normoactive bowel sounds, soft,nontender, no hepatosplenomegaly.   Extremities:  LLE 1+edema, no tenderness  Neuro:  CN 2-12 intact    Labs:  Recent Results (from the past 2 Absolute Manual 0.28 0.00 - 0.70 x10(3) uL    Basophil Absolute Manual 0.00 0.00 - 0.20 x10(3) uL    Metamyelocyte Absolute Manual 0.14 (H) 0 x10(3) uL    Neutrophils % Manual 67 %    Band % 11 %    Lymphocyte % Manual 13 %    Monocyte % Manual 6 %    Eosi

## 2019-07-10 NOTE — PROGRESS NOTES
Pt here for APN f/u visit and C9 FOLFIRINOX. Pt appetite is very good this visit. He continues to use medical marijuana with good effects. Denies any nausea or vomiting. BM normal. Denies any neuropathy.

## 2019-07-12 ENCOUNTER — OFFICE VISIT (OUTPATIENT)
Dept: HEMATOLOGY/ONCOLOGY | Facility: HOSPITAL | Age: 69
End: 2019-07-12
Attending: INTERNAL MEDICINE
Payer: MEDICARE

## 2019-07-12 DIAGNOSIS — C78.00 MALIGNANT NEOPLASM METASTATIC TO LUNG, UNSPECIFIED LATERALITY (HCC): ICD-10-CM

## 2019-07-12 DIAGNOSIS — C78.7 LIVER METASTASES (HCC): ICD-10-CM

## 2019-07-12 DIAGNOSIS — C25.9 PANCREATIC CARCINOMA (HCC): Primary | ICD-10-CM

## 2019-07-12 PROCEDURE — 96372 THER/PROPH/DIAG INJ SC/IM: CPT

## 2019-07-12 NOTE — PROGRESS NOTES
Education Record    Learner:  Patient    Disease / Diagnosis: Pancreatic Carcinoma    Barriers / Limitations:  None   Comments:    Method:  Brief focused   Comments:    General Topics:  Medication, Procedure and Plan of care reviewed   Comments:    Outcome

## 2019-07-23 NOTE — PROGRESS NOTES
[facility] Progress Note      Patient Name:  Scott Bond  YOB: 1950  Medical Record Number:  [id number]    Date of visit: 7/24/2019    CHIEF COMPLAINT: Metastatic pancreatic carcinoma, daphne PE, L leg DVT.     HPI:     71year old t OR) Take by mouth. Disp:  Rfl:    Cyanocobalamin (B-12 OR) Take by mouth.  Disp:  Rfl:    NON FORMULARY  Disp:  Rfl:    NON FORMULARY  Disp:  Rfl:    NON FORMULARY  Disp:  Rfl:    OLANZapine 2.5 MG Oral Tab Take 1-2 tablets (2.5-5 mg total) by mouth nightly identified.      IMAGING:        LABS:     Recent Results (from the past 24 hour(s))   CARBOHYDRATE ANTIGEN 19-9    Collection Time: 07/24/19  9:19 AM   Result Value Ref Range    Carbohydrate Ag 19-9 25,734.9 (H) <=37.0 U/mL   COMP METABOLIC PANEL (14)    C (H) 0 x10(3) uL    Neutrophils % Manual 59 %    Band % 19 %    Lymphocyte % Manual 14 %    Monocyte % Manual 2 %    Eosinophil % Manual 2 %    Basophil % Manual 1 %    Metamyelocyte % 2 %    Myelocyte % 1 %    Total Cells Counted 100     RBC Morphology See 53260    7/24/2019

## 2019-07-24 ENCOUNTER — OFFICE VISIT (OUTPATIENT)
Dept: HEMATOLOGY/ONCOLOGY | Facility: HOSPITAL | Age: 69
End: 2019-07-24
Attending: INTERNAL MEDICINE
Payer: MEDICARE

## 2019-07-24 VITALS
TEMPERATURE: 97 F | DIASTOLIC BLOOD PRESSURE: 79 MMHG | OXYGEN SATURATION: 97 % | BODY MASS INDEX: 28.46 KG/M2 | RESPIRATION RATE: 18 BRPM | SYSTOLIC BLOOD PRESSURE: 143 MMHG | HEART RATE: 64 BPM | WEIGHT: 190 LBS | HEIGHT: 68.5 IN

## 2019-07-24 DIAGNOSIS — R79.89 ELEVATED LFTS: ICD-10-CM

## 2019-07-24 DIAGNOSIS — C78.00 MALIGNANT NEOPLASM METASTATIC TO LUNG, UNSPECIFIED LATERALITY (HCC): ICD-10-CM

## 2019-07-24 DIAGNOSIS — R29.898 WEAKNESS OF BOTH LOWER EXTREMITIES: ICD-10-CM

## 2019-07-24 DIAGNOSIS — C25.9 PANCREATIC CARCINOMA (HCC): Primary | ICD-10-CM

## 2019-07-24 DIAGNOSIS — C78.7 LIVER METASTASES (HCC): ICD-10-CM

## 2019-07-24 DIAGNOSIS — R53.83 FATIGUE DUE TO TREATMENT: ICD-10-CM

## 2019-07-24 DIAGNOSIS — T45.1X5A CHEMOTHERAPY-INDUCED NAUSEA: ICD-10-CM

## 2019-07-24 DIAGNOSIS — I26.99 BILATERAL PULMONARY EMBOLISM (HCC): ICD-10-CM

## 2019-07-24 DIAGNOSIS — R11.0 CHEMOTHERAPY-INDUCED NAUSEA: ICD-10-CM

## 2019-07-24 LAB
ALBUMIN SERPL-MCNC: 3.6 G/DL (ref 3.4–5)
ALBUMIN/GLOB SERPL: 1.1 {RATIO} (ref 1–2)
ALP LIVER SERPL-CCNC: 303 U/L (ref 45–117)
ALT SERPL-CCNC: 69 U/L (ref 16–61)
ANION GAP SERPL CALC-SCNC: 7 MMOL/L (ref 0–18)
AST SERPL-CCNC: 51 U/L (ref 15–37)
BASOPHILS # BLD: 0.19 X10(3) UL (ref 0–0.2)
BASOPHILS NFR BLD: 1 %
BILIRUB SERPL-MCNC: 0.3 MG/DL (ref 0.1–2)
BUN BLD-MCNC: 14 MG/DL (ref 7–18)
BUN/CREAT SERPL: 15.6 (ref 10–20)
CALCIUM BLD-MCNC: 9 MG/DL (ref 8.5–10.1)
CANCER AG19-9 SERPL-ACNC: ABNORMAL U/ML (ref ?–37)
CHLORIDE SERPL-SCNC: 107 MMOL/L (ref 98–112)
CO2 SERPL-SCNC: 27 MMOL/L (ref 21–32)
CREAT BLD-MCNC: 0.9 MG/DL (ref 0.7–1.3)
DEPRECATED RDW RBC AUTO: 65.4 FL (ref 35.1–46.3)
EOSINOPHIL # BLD: 0.37 X10(3) UL (ref 0–0.7)
EOSINOPHIL NFR BLD: 2 %
ERYTHROCYTE [DISTWIDTH] IN BLOOD BY AUTOMATED COUNT: 17.2 % (ref 11–15)
GLOBULIN PLAS-MCNC: 3.4 G/DL (ref 2.8–4.4)
GLUCOSE BLD-MCNC: 145 MG/DL (ref 70–99)
HCT VFR BLD AUTO: 40 % (ref 39–53)
HGB BLD-MCNC: 12.7 G/DL (ref 13–17.5)
LYMPHOCYTES NFR BLD: 14 %
LYMPHOCYTES NFR BLD: 2.59 X10(3) UL (ref 1–4)
M PROTEIN MFR SERPL ELPH: 7 G/DL (ref 6.4–8.2)
MCH RBC QN AUTO: 32.2 PG (ref 26–34)
MCHC RBC AUTO-ENTMCNC: 31.8 G/DL (ref 31–37)
MCV RBC AUTO: 101.3 FL (ref 80–100)
METAMYELOCYTES # BLD: 0.37 X10(3) UL
METAMYELOCYTES NFR BLD: 2 %
MONOCYTES # BLD: 0.37 X10(3) UL (ref 0.1–1)
MONOCYTES NFR BLD: 2 %
MYELOCYTES # BLD: 0.19 X10(3) UL
MYELOCYTES NFR BLD: 1 %
NEUTROPHILS # BLD AUTO: 13.11 X10 (3) UL (ref 1.5–7.7)
NEUTROPHILS NFR BLD: 59 %
NEUTS BAND NFR BLD: 19 %
NEUTS HYPERSEG # BLD: 14.43 X10(3) UL (ref 1.5–7.7)
OSMOLALITY SERPL CALC.SUM OF ELEC: 295 MOSM/KG (ref 275–295)
PLATELET # BLD AUTO: 137 10(3)UL (ref 150–450)
PLATELET MORPHOLOGY: NORMAL
POTASSIUM SERPL-SCNC: 3.7 MMOL/L (ref 3.5–5.1)
RBC # BLD AUTO: 3.95 X10(6)UL (ref 3.8–5.8)
SODIUM SERPL-SCNC: 141 MMOL/L (ref 136–145)
TOTAL CELLS COUNTED: 100
WBC # BLD AUTO: 18.5 X10(3) UL (ref 4–11)

## 2019-07-24 PROCEDURE — 85025 COMPLETE CBC W/AUTO DIFF WBC: CPT

## 2019-07-24 PROCEDURE — 85027 COMPLETE CBC AUTOMATED: CPT

## 2019-07-24 PROCEDURE — 99214 OFFICE O/P EST MOD 30 MIN: CPT | Performed by: INTERNAL MEDICINE

## 2019-07-24 PROCEDURE — 86301 IMMUNOASSAY TUMOR CA 19-9: CPT

## 2019-07-24 PROCEDURE — 96375 TX/PRO/DX INJ NEW DRUG ADDON: CPT

## 2019-07-24 PROCEDURE — 96368 THER/DIAG CONCURRENT INF: CPT

## 2019-07-24 PROCEDURE — 96367 TX/PROPH/DG ADDL SEQ IV INF: CPT

## 2019-07-24 PROCEDURE — 96413 CHEMO IV INFUSION 1 HR: CPT

## 2019-07-24 PROCEDURE — 85007 BL SMEAR W/DIFF WBC COUNT: CPT

## 2019-07-24 PROCEDURE — 80053 COMPREHEN METABOLIC PANEL: CPT

## 2019-07-24 PROCEDURE — 96411 CHEMO IV PUSH ADDL DRUG: CPT

## 2019-07-24 RX ORDER — PROCHLORPERAZINE MALEATE 10 MG
10 TABLET ORAL EVERY 6 HOURS PRN
Status: CANCELLED | OUTPATIENT
Start: 2019-07-24

## 2019-07-24 RX ORDER — FLUOROURACIL 50 MG/ML
2400 INJECTION, SOLUTION INTRAVENOUS CONTINUOUS
Status: DISCONTINUED | OUTPATIENT
Start: 2019-07-24 | End: 2019-07-24

## 2019-07-24 RX ORDER — FLUOROURACIL 50 MG/ML
400 INJECTION, SOLUTION INTRAVENOUS ONCE
Status: COMPLETED | OUTPATIENT
Start: 2019-07-24 | End: 2019-07-24

## 2019-07-24 RX ORDER — METOCLOPRAMIDE HYDROCHLORIDE 5 MG/ML
10 INJECTION INTRAMUSCULAR; INTRAVENOUS EVERY 6 HOURS PRN
Status: CANCELLED | OUTPATIENT
Start: 2019-07-24

## 2019-07-24 RX ORDER — FLUOROURACIL 50 MG/ML
400 INJECTION, SOLUTION INTRAVENOUS ONCE
Status: CANCELLED | OUTPATIENT
Start: 2019-07-24

## 2019-07-24 RX ORDER — LORAZEPAM 0.5 MG/1
TABLET ORAL EVERY 4 HOURS PRN
Status: CANCELLED | OUTPATIENT
Start: 2019-07-24

## 2019-07-24 RX ORDER — ONDANSETRON 2 MG/ML
8 INJECTION INTRAMUSCULAR; INTRAVENOUS EVERY 6 HOURS PRN
Status: CANCELLED | OUTPATIENT
Start: 2019-07-24

## 2019-07-24 RX ORDER — ATROPINE SULFATE 0.4 MG/ML
0.2 AMPUL (ML) INJECTION ONCE
Status: CANCELLED | OUTPATIENT
Start: 2019-07-24

## 2019-07-24 RX ORDER — LORAZEPAM 2 MG/ML
INJECTION INTRAMUSCULAR EVERY 4 HOURS PRN
Status: CANCELLED | OUTPATIENT
Start: 2019-07-24

## 2019-07-24 RX ORDER — ATROPINE SULFATE 0.4 MG/ML
0.2 AMPUL (ML) INJECTION ONCE
Status: COMPLETED | OUTPATIENT
Start: 2019-07-24 | End: 2019-07-24

## 2019-07-24 RX ORDER — FLUOROURACIL 50 MG/ML
2400 INJECTION, SOLUTION INTRAVENOUS CONTINUOUS
Status: CANCELLED | OUTPATIENT
Start: 2019-07-24

## 2019-07-24 RX ADMIN — FLUOROURACIL 4800 MG: 50 INJECTION, SOLUTION INTRAVENOUS at 13:23:00

## 2019-07-24 RX ADMIN — FLUOROURACIL 800 MG: 50 INJECTION, SOLUTION INTRAVENOUS at 13:14:00

## 2019-07-24 RX ADMIN — ATROPINE SULFATE 0.2 MG: 0.4 MG/ML AMPUL (ML) INJECTION at 11:34:00

## 2019-07-24 NOTE — PROGRESS NOTES
Pt here for C10D1 Folfiri for pancreatic CA.   Arrives Ambulating independently, accompanied by Family member           Modifications in dose or schedule: No     Frequency of blood return and site check throughout administration: Prior to administration, Ev

## 2019-07-24 NOTE — PROGRESS NOTES
Md f/u for pancreatic ca. Due for C10 FOLFIRINOX. Reports some constipation in the mornings, but is able to produce BM 1-2 times daily. Has fatigue, but is managing well.      Education Record    Learner:  Patient, family member    Barriers / Limitations:

## 2019-07-26 ENCOUNTER — OFFICE VISIT (OUTPATIENT)
Dept: HEMATOLOGY/ONCOLOGY | Facility: HOSPITAL | Age: 69
End: 2019-07-26
Attending: INTERNAL MEDICINE
Payer: MEDICARE

## 2019-07-26 DIAGNOSIS — C78.7 LIVER METASTASES (HCC): ICD-10-CM

## 2019-07-26 DIAGNOSIS — R06.2 WHEEZING: ICD-10-CM

## 2019-07-26 DIAGNOSIS — C78.00 MALIGNANT NEOPLASM METASTATIC TO LUNG, UNSPECIFIED LATERALITY (HCC): ICD-10-CM

## 2019-07-26 DIAGNOSIS — C25.9 PANCREATIC CARCINOMA (HCC): Primary | ICD-10-CM

## 2019-07-26 PROCEDURE — 96372 THER/PROPH/DIAG INJ SC/IM: CPT

## 2019-07-26 RX ORDER — SODIUM CHLORIDE 0.9 % (FLUSH) 0.9 %
10 SYRINGE (ML) INJECTION ONCE
Status: COMPLETED | OUTPATIENT
Start: 2019-07-26 | End: 2019-07-26

## 2019-07-26 RX ORDER — SODIUM CHLORIDE 0.9 % (FLUSH) 0.9 %
10 SYRINGE (ML) INJECTION ONCE
Status: CANCELLED | OUTPATIENT
Start: 2019-07-26

## 2019-07-26 RX ADMIN — SODIUM CHLORIDE 0.9 % (FLUSH) 10 ML: 0.9 % SYRINGE (ML) INJECTION at 10:58:00

## 2019-08-07 ENCOUNTER — OFFICE VISIT (OUTPATIENT)
Dept: HEMATOLOGY/ONCOLOGY | Facility: HOSPITAL | Age: 69
End: 2019-08-07
Attending: INTERNAL MEDICINE
Payer: MEDICARE

## 2019-08-07 VITALS
OXYGEN SATURATION: 95 % | TEMPERATURE: 96 F | WEIGHT: 193.19 LBS | RESPIRATION RATE: 20 BRPM | SYSTOLIC BLOOD PRESSURE: 129 MMHG | DIASTOLIC BLOOD PRESSURE: 77 MMHG | HEART RATE: 64 BPM | BODY MASS INDEX: 28.94 KG/M2 | HEIGHT: 68.5 IN

## 2019-08-07 DIAGNOSIS — C78.7 LIVER METASTASES (HCC): ICD-10-CM

## 2019-08-07 DIAGNOSIS — C25.9 PANCREATIC CARCINOMA (HCC): Primary | ICD-10-CM

## 2019-08-07 DIAGNOSIS — Z86.711 PERSONAL HISTORY OF PE (PULMONARY EMBOLISM): ICD-10-CM

## 2019-08-07 DIAGNOSIS — Z79.01 CHRONIC ANTICOAGULATION: ICD-10-CM

## 2019-08-07 DIAGNOSIS — R53.83 FATIGUE DUE TO TREATMENT: ICD-10-CM

## 2019-08-07 DIAGNOSIS — Z86.718 PERSONAL HISTORY OF DVT (DEEP VEIN THROMBOSIS): ICD-10-CM

## 2019-08-07 DIAGNOSIS — C78.00 MALIGNANT NEOPLASM METASTATIC TO LUNG, UNSPECIFIED LATERALITY (HCC): ICD-10-CM

## 2019-08-07 DIAGNOSIS — Z51.11 ENCOUNTER FOR CHEMOTHERAPY MANAGEMENT: ICD-10-CM

## 2019-08-07 LAB
ALBUMIN SERPL-MCNC: 3.7 G/DL (ref 3.4–5)
ALBUMIN/GLOB SERPL: 1.1 {RATIO} (ref 1–2)
ALP LIVER SERPL-CCNC: 286 U/L (ref 45–117)
ALT SERPL-CCNC: 49 U/L (ref 16–61)
ANION GAP SERPL CALC-SCNC: 4 MMOL/L (ref 0–18)
AST SERPL-CCNC: 40 U/L (ref 15–37)
BASOPHILS # BLD: 0 X10(3) UL (ref 0–0.2)
BASOPHILS NFR BLD: 0 %
BILIRUB SERPL-MCNC: 0.3 MG/DL (ref 0.1–2)
BUN BLD-MCNC: 12 MG/DL (ref 7–18)
BUN/CREAT SERPL: 13 (ref 10–20)
CALCIUM BLD-MCNC: 9.2 MG/DL (ref 8.5–10.1)
CANCER AG19-9 SERPL-ACNC: ABNORMAL U/ML (ref ?–37)
CHLORIDE SERPL-SCNC: 108 MMOL/L (ref 98–112)
CO2 SERPL-SCNC: 28 MMOL/L (ref 21–32)
CREAT BLD-MCNC: 0.92 MG/DL (ref 0.7–1.3)
DEPRECATED RDW RBC AUTO: 61.1 FL (ref 35.1–46.3)
EOSINOPHIL # BLD: 0 X10(3) UL (ref 0–0.7)
EOSINOPHIL NFR BLD: 0 %
ERYTHROCYTE [DISTWIDTH] IN BLOOD BY AUTOMATED COUNT: 16.3 % (ref 11–15)
GLOBULIN PLAS-MCNC: 3.3 G/DL (ref 2.8–4.4)
GLUCOSE BLD-MCNC: 156 MG/DL (ref 70–99)
HCT VFR BLD AUTO: 40.1 % (ref 39–53)
HGB BLD-MCNC: 12.8 G/DL (ref 13–17.5)
LYMPHOCYTES NFR BLD: 1.26 X10(3) UL (ref 1–4)
LYMPHOCYTES NFR BLD: 8 %
M PROTEIN MFR SERPL ELPH: 7 G/DL (ref 6.4–8.2)
MCH RBC QN AUTO: 32.2 PG (ref 26–34)
MCHC RBC AUTO-ENTMCNC: 31.9 G/DL (ref 31–37)
MCV RBC AUTO: 101 FL (ref 80–100)
METAMYELOCYTES # BLD: 0.47 X10(3) UL
METAMYELOCYTES NFR BLD: 3 %
MONOCYTES # BLD: 0.79 X10(3) UL (ref 0.1–1)
MONOCYTES NFR BLD: 5 %
MORPHOLOGY: NORMAL
MYELOCYTES # BLD: 0.16 X10(3) UL
MYELOCYTES NFR BLD: 1 %
NEUTROPHILS # BLD AUTO: 10.92 X10 (3) UL (ref 1.5–7.7)
NEUTROPHILS NFR BLD: 72 %
NEUTS BAND NFR BLD: 11 %
NEUTS HYPERSEG # BLD: 13.11 X10(3) UL (ref 1.5–7.7)
OSMOLALITY SERPL CALC.SUM OF ELEC: 293 MOSM/KG (ref 275–295)
PLATELET # BLD AUTO: 128 10(3)UL (ref 150–450)
PLATELET MORPHOLOGY: NORMAL
POTASSIUM SERPL-SCNC: 3.6 MMOL/L (ref 3.5–5.1)
RBC # BLD AUTO: 3.97 X10(6)UL (ref 3.8–5.8)
SODIUM SERPL-SCNC: 140 MMOL/L (ref 136–145)
TOTAL CELLS COUNTED: 100
WBC # BLD AUTO: 15.8 X10(3) UL (ref 4–11)

## 2019-08-07 PROCEDURE — 96368 THER/DIAG CONCURRENT INF: CPT

## 2019-08-07 PROCEDURE — 96411 CHEMO IV PUSH ADDL DRUG: CPT

## 2019-08-07 PROCEDURE — 85007 BL SMEAR W/DIFF WBC COUNT: CPT

## 2019-08-07 PROCEDURE — 85025 COMPLETE CBC W/AUTO DIFF WBC: CPT

## 2019-08-07 PROCEDURE — 96367 TX/PROPH/DG ADDL SEQ IV INF: CPT

## 2019-08-07 PROCEDURE — 96375 TX/PRO/DX INJ NEW DRUG ADDON: CPT

## 2019-08-07 PROCEDURE — 96413 CHEMO IV INFUSION 1 HR: CPT

## 2019-08-07 PROCEDURE — 86301 IMMUNOASSAY TUMOR CA 19-9: CPT

## 2019-08-07 PROCEDURE — 85027 COMPLETE CBC AUTOMATED: CPT

## 2019-08-07 PROCEDURE — 80053 COMPREHEN METABOLIC PANEL: CPT

## 2019-08-07 PROCEDURE — 99214 OFFICE O/P EST MOD 30 MIN: CPT | Performed by: CLINICAL NURSE SPECIALIST

## 2019-08-07 RX ORDER — LORAZEPAM 2 MG/ML
INJECTION INTRAMUSCULAR EVERY 4 HOURS PRN
Status: CANCELLED | OUTPATIENT
Start: 2019-08-07

## 2019-08-07 RX ORDER — ATROPINE SULFATE 0.4 MG/ML
0.2 AMPUL (ML) INJECTION ONCE
Status: COMPLETED | OUTPATIENT
Start: 2019-08-07 | End: 2019-08-07

## 2019-08-07 RX ORDER — FLUOROURACIL 50 MG/ML
2400 INJECTION, SOLUTION INTRAVENOUS CONTINUOUS
Status: CANCELLED | OUTPATIENT
Start: 2019-08-07

## 2019-08-07 RX ORDER — FLUOROURACIL 50 MG/ML
400 INJECTION, SOLUTION INTRAVENOUS ONCE
Status: CANCELLED | OUTPATIENT
Start: 2019-08-07

## 2019-08-07 RX ORDER — PROCHLORPERAZINE MALEATE 10 MG
10 TABLET ORAL EVERY 6 HOURS PRN
Status: CANCELLED | OUTPATIENT
Start: 2019-08-07

## 2019-08-07 RX ORDER — FLUOROURACIL 50 MG/ML
400 INJECTION, SOLUTION INTRAVENOUS ONCE
Status: COMPLETED | OUTPATIENT
Start: 2019-08-07 | End: 2019-08-07

## 2019-08-07 RX ORDER — LORAZEPAM 0.5 MG/1
TABLET ORAL EVERY 4 HOURS PRN
Status: CANCELLED | OUTPATIENT
Start: 2019-08-07

## 2019-08-07 RX ORDER — FLUOROURACIL 50 MG/ML
2400 INJECTION, SOLUTION INTRAVENOUS CONTINUOUS
Status: DISCONTINUED | OUTPATIENT
Start: 2019-08-07 | End: 2019-08-07

## 2019-08-07 RX ORDER — METOCLOPRAMIDE HYDROCHLORIDE 5 MG/ML
10 INJECTION INTRAMUSCULAR; INTRAVENOUS EVERY 6 HOURS PRN
Status: CANCELLED | OUTPATIENT
Start: 2019-08-07

## 2019-08-07 RX ORDER — ONDANSETRON 2 MG/ML
8 INJECTION INTRAMUSCULAR; INTRAVENOUS EVERY 6 HOURS PRN
Status: CANCELLED | OUTPATIENT
Start: 2019-08-07

## 2019-08-07 RX ORDER — ATROPINE SULFATE 0.4 MG/ML
0.2 AMPUL (ML) INJECTION ONCE
Status: CANCELLED | OUTPATIENT
Start: 2019-08-07

## 2019-08-07 RX ADMIN — FLUOROURACIL 4800 MG: 50 INJECTION, SOLUTION INTRAVENOUS at 13:21:00

## 2019-08-07 RX ADMIN — FLUOROURACIL 800 MG: 50 INJECTION, SOLUTION INTRAVENOUS at 13:14:00

## 2019-08-07 RX ADMIN — ATROPINE SULFATE 0.2 MG: 0.4 MG/ML AMPUL (ML) INJECTION at 10:49:00

## 2019-08-07 NOTE — PROGRESS NOTES
Patient presents with: Follow - Up: APN assessment prior to treatment    Pt is here for treatment; C11 D1 folfirinox is expected. Pt relates he has been doing well - he has been gardening daily.  He has been using the medical marijuana to aid with diet - d

## 2019-08-07 NOTE — PROGRESS NOTES
Pt here for C 11 D FOLFIRI.   Arrives Ambulating with cane, accompanied by Self and Family member           Modifications in dose or schedule: Yes oxaplatin removed from treatment     Frequency of blood return and site check throughout administration: Prior

## 2019-08-09 ENCOUNTER — OFFICE VISIT (OUTPATIENT)
Dept: HEMATOLOGY/ONCOLOGY | Facility: HOSPITAL | Age: 69
End: 2019-08-09
Attending: INTERNAL MEDICINE
Payer: MEDICARE

## 2019-08-09 DIAGNOSIS — C78.00 MALIGNANT NEOPLASM METASTATIC TO LUNG, UNSPECIFIED LATERALITY (HCC): ICD-10-CM

## 2019-08-09 DIAGNOSIS — R06.2 WHEEZING: ICD-10-CM

## 2019-08-09 DIAGNOSIS — C25.9 PANCREATIC CARCINOMA (HCC): Primary | ICD-10-CM

## 2019-08-09 DIAGNOSIS — C78.7 LIVER METASTASES (HCC): ICD-10-CM

## 2019-08-09 PROCEDURE — 96372 THER/PROPH/DIAG INJ SC/IM: CPT

## 2019-08-09 RX ORDER — SODIUM CHLORIDE 0.9 % (FLUSH) 0.9 %
10 SYRINGE (ML) INJECTION ONCE
Status: COMPLETED | OUTPATIENT
Start: 2019-08-09 | End: 2019-08-09

## 2019-08-09 RX ORDER — SODIUM CHLORIDE 0.9 % (FLUSH) 0.9 %
10 SYRINGE (ML) INJECTION ONCE
Status: CANCELLED | OUTPATIENT
Start: 2019-08-09

## 2019-08-09 RX ADMIN — SODIUM CHLORIDE 0.9 % (FLUSH) 10 ML: 0.9 % SYRINGE (ML) INJECTION at 11:53:00

## 2019-08-09 NOTE — PROGRESS NOTES
Education Record    Learner:  Patient    Disease / Diagnosis: pump d/c and inj    Barriers / Limitations:  None   Comments:    Method:  Brief focused   Comments:    General Topics:  Plan of care reviewed   Comments:    Outcome:  Shows understanding   Jimbo Castorena

## 2019-08-21 ENCOUNTER — OFFICE VISIT (OUTPATIENT)
Dept: HEMATOLOGY/ONCOLOGY | Facility: HOSPITAL | Age: 69
End: 2019-08-21
Attending: INTERNAL MEDICINE
Payer: MEDICARE

## 2019-08-21 VITALS
OXYGEN SATURATION: 98 % | BODY MASS INDEX: 29 KG/M2 | DIASTOLIC BLOOD PRESSURE: 75 MMHG | SYSTOLIC BLOOD PRESSURE: 124 MMHG | RESPIRATION RATE: 18 BRPM | TEMPERATURE: 96 F | WEIGHT: 191.19 LBS | HEART RATE: 76 BPM

## 2019-08-21 DIAGNOSIS — C78.7 LIVER METASTASES (HCC): ICD-10-CM

## 2019-08-21 DIAGNOSIS — R79.89 ELEVATED LFTS: ICD-10-CM

## 2019-08-21 DIAGNOSIS — R05.9 COUGH: ICD-10-CM

## 2019-08-21 DIAGNOSIS — C78.00 MALIGNANT NEOPLASM METASTATIC TO LUNG, UNSPECIFIED LATERALITY (HCC): ICD-10-CM

## 2019-08-21 DIAGNOSIS — T45.1X5D ADVERSE EFFECT OF CHEMOTHERAPY, SUBSEQUENT ENCOUNTER: ICD-10-CM

## 2019-08-21 DIAGNOSIS — I26.99 BILATERAL PULMONARY EMBOLISM (HCC): ICD-10-CM

## 2019-08-21 DIAGNOSIS — C25.9 PANCREATIC CARCINOMA (HCC): Primary | ICD-10-CM

## 2019-08-21 LAB
ALBUMIN SERPL-MCNC: 3.7 G/DL (ref 3.4–5)
ALBUMIN/GLOB SERPL: 1.1 {RATIO} (ref 1–2)
ALP LIVER SERPL-CCNC: 278 U/L (ref 45–117)
ALT SERPL-CCNC: 42 U/L (ref 16–61)
ANION GAP SERPL CALC-SCNC: 5 MMOL/L (ref 0–18)
AST SERPL-CCNC: 34 U/L (ref 15–37)
BASOPHILS # BLD: 0 X10(3) UL (ref 0–0.2)
BASOPHILS NFR BLD: 0 %
BILIRUB SERPL-MCNC: 0.4 MG/DL (ref 0.1–2)
BUN BLD-MCNC: 11 MG/DL (ref 7–18)
BUN/CREAT SERPL: 12.4 (ref 10–20)
CALCIUM BLD-MCNC: 9.1 MG/DL (ref 8.5–10.1)
CANCER AG19-9 SERPL-ACNC: ABNORMAL U/ML (ref ?–37)
CHLORIDE SERPL-SCNC: 109 MMOL/L (ref 98–112)
CO2 SERPL-SCNC: 26 MMOL/L (ref 21–32)
CREAT BLD-MCNC: 0.89 MG/DL (ref 0.7–1.3)
DEPRECATED RDW RBC AUTO: 58.3 FL (ref 35.1–46.3)
EOSINOPHIL # BLD: 0.17 X10(3) UL (ref 0–0.7)
EOSINOPHIL NFR BLD: 1 %
ERYTHROCYTE [DISTWIDTH] IN BLOOD BY AUTOMATED COUNT: 15.8 % (ref 11–15)
GLOBULIN PLAS-MCNC: 3.4 G/DL (ref 2.8–4.4)
GLUCOSE BLD-MCNC: 165 MG/DL (ref 70–99)
HCT VFR BLD AUTO: 41.4 % (ref 39–53)
HGB BLD-MCNC: 13.4 G/DL (ref 13–17.5)
LYMPHOCYTES NFR BLD: 1.39 X10(3) UL (ref 1–4)
LYMPHOCYTES NFR BLD: 8 %
M PROTEIN MFR SERPL ELPH: 7.1 G/DL (ref 6.4–8.2)
MCH RBC QN AUTO: 32.3 PG (ref 26–34)
MCHC RBC AUTO-ENTMCNC: 32.4 G/DL (ref 31–37)
MCV RBC AUTO: 99.8 FL (ref 80–100)
METAMYELOCYTES # BLD: 1.39 X10(3) UL
METAMYELOCYTES NFR BLD: 8 %
MONOCYTES # BLD: 0.35 X10(3) UL (ref 0.1–1)
MONOCYTES NFR BLD: 2 %
MORPHOLOGY: NORMAL
MYELOCYTES # BLD: 0.35 X10(3) UL
MYELOCYTES NFR BLD: 2 %
NEUTROPHILS # BLD AUTO: 12.18 X10 (3) UL (ref 1.5–7.7)
NEUTROPHILS NFR BLD: 70 %
NEUTS BAND NFR BLD: 9 %
NEUTS HYPERSEG # BLD: 13.75 X10(3) UL (ref 1.5–7.7)
OSMOLALITY SERPL CALC.SUM OF ELEC: 293 MOSM/KG (ref 275–295)
PLATELET # BLD AUTO: 150 10(3)UL (ref 150–450)
POTASSIUM SERPL-SCNC: 3.7 MMOL/L (ref 3.5–5.1)
RBC # BLD AUTO: 4.15 X10(6)UL (ref 3.8–5.8)
SODIUM SERPL-SCNC: 140 MMOL/L (ref 136–145)
TOTAL CELLS COUNTED: 100
WBC # BLD AUTO: 17.4 X10(3) UL (ref 4–11)

## 2019-08-21 PROCEDURE — 99214 OFFICE O/P EST MOD 30 MIN: CPT | Performed by: INTERNAL MEDICINE

## 2019-08-21 PROCEDURE — 96411 CHEMO IV PUSH ADDL DRUG: CPT

## 2019-08-21 PROCEDURE — 85027 COMPLETE CBC AUTOMATED: CPT

## 2019-08-21 PROCEDURE — 96367 TX/PROPH/DG ADDL SEQ IV INF: CPT

## 2019-08-21 PROCEDURE — 85007 BL SMEAR W/DIFF WBC COUNT: CPT

## 2019-08-21 PROCEDURE — 96368 THER/DIAG CONCURRENT INF: CPT

## 2019-08-21 PROCEDURE — 86301 IMMUNOASSAY TUMOR CA 19-9: CPT

## 2019-08-21 PROCEDURE — 80053 COMPREHEN METABOLIC PANEL: CPT

## 2019-08-21 PROCEDURE — 85025 COMPLETE CBC W/AUTO DIFF WBC: CPT

## 2019-08-21 PROCEDURE — 96413 CHEMO IV INFUSION 1 HR: CPT

## 2019-08-21 PROCEDURE — 96375 TX/PRO/DX INJ NEW DRUG ADDON: CPT

## 2019-08-21 RX ORDER — LORAZEPAM 2 MG/ML
INJECTION INTRAMUSCULAR EVERY 4 HOURS PRN
Status: CANCELLED | OUTPATIENT
Start: 2019-08-21

## 2019-08-21 RX ORDER — ATROPINE SULFATE 0.4 MG/ML
0.2 AMPUL (ML) INJECTION ONCE
Status: COMPLETED | OUTPATIENT
Start: 2019-08-21 | End: 2019-08-21

## 2019-08-21 RX ORDER — PROCHLORPERAZINE MALEATE 10 MG
10 TABLET ORAL EVERY 6 HOURS PRN
Status: CANCELLED | OUTPATIENT
Start: 2019-08-21

## 2019-08-21 RX ORDER — ATROPINE SULFATE 0.4 MG/ML
0.2 AMPUL (ML) INJECTION ONCE
Status: CANCELLED | OUTPATIENT
Start: 2019-08-21

## 2019-08-21 RX ORDER — FLUOROURACIL 50 MG/ML
400 INJECTION, SOLUTION INTRAVENOUS ONCE
Status: COMPLETED | OUTPATIENT
Start: 2019-08-21 | End: 2019-08-21

## 2019-08-21 RX ORDER — LORAZEPAM 0.5 MG/1
TABLET ORAL EVERY 4 HOURS PRN
Status: CANCELLED | OUTPATIENT
Start: 2019-08-21

## 2019-08-21 RX ORDER — FLUOROURACIL 50 MG/ML
400 INJECTION, SOLUTION INTRAVENOUS ONCE
Status: CANCELLED | OUTPATIENT
Start: 2019-08-21

## 2019-08-21 RX ORDER — FLUOROURACIL 50 MG/ML
2400 INJECTION, SOLUTION INTRAVENOUS CONTINUOUS
Status: CANCELLED | OUTPATIENT
Start: 2019-08-21

## 2019-08-21 RX ORDER — ONDANSETRON 2 MG/ML
8 INJECTION INTRAMUSCULAR; INTRAVENOUS EVERY 6 HOURS PRN
Status: CANCELLED | OUTPATIENT
Start: 2019-08-21

## 2019-08-21 RX ORDER — METOCLOPRAMIDE HYDROCHLORIDE 5 MG/ML
10 INJECTION INTRAMUSCULAR; INTRAVENOUS EVERY 6 HOURS PRN
Status: CANCELLED | OUTPATIENT
Start: 2019-08-21

## 2019-08-21 RX ORDER — FLUOROURACIL 50 MG/ML
2400 INJECTION, SOLUTION INTRAVENOUS CONTINUOUS
Status: DISCONTINUED | OUTPATIENT
Start: 2019-08-21 | End: 2019-08-21

## 2019-08-21 RX ADMIN — ATROPINE SULFATE 0.2 MG: 0.4 MG/ML AMPUL (ML) INJECTION at 10:56:00

## 2019-08-21 RX ADMIN — FLUOROURACIL 800 MG: 50 INJECTION, SOLUTION INTRAVENOUS at 12:31:00

## 2019-08-21 RX ADMIN — FLUOROURACIL 4800 MG: 50 INJECTION, SOLUTION INTRAVENOUS at 12:42:00

## 2019-08-21 NOTE — PROGRESS NOTES
Pt here for C12D1.   Arrives Ambulating independently, accompanied by Family member           Patient denies possible pregnancy since last therapy cycle: {NOT APPLICABLE    Modifications in dose or schedule: No     Frequency of blood return and site check t

## 2019-08-21 NOTE — PROGRESS NOTES
[facility] Progress Note      Patient Name:  Layne Armendariz  YOB: 1950  Medical Record Number:  [id number]    Date of visit: 8/21/2019    CHIEF COMPLAINT: Metastatic pancreatic carcinoma, daphne PE, L leg DVT.     HPI:     71year old t OLANZapine 2.5 MG Oral Tab Take 1-2 tablets (2.5-5 mg total) by mouth nightly. Disp: 60 tablet Rfl: 3   Ascorbic Acid (VITAMIN C) 1000 MG Oral Tab Take 1,000 mg by mouth daily.  Disp:  Rfl:    pancrelipase, Lip-Prot-Amyl, 5000-71258 units Oral Cap DR Part mmol/L    Potassium 3.7 3.5 - 5.1 mmol/L    Chloride 109 98 - 112 mmol/L    CO2 26.0 21.0 - 32.0 mmol/L    Anion Gap 5 0 - 18 mmol/L    BUN 11 7 - 18 mg/dL    Creatinine 0.89 0.70 - 1.30 mg/dL    BUN/CREA Ratio 12.4 10.0 - 20.0    Calcium, Total 9.1 8.5 - ASSESSMENT AND PLAN:     # Thrombocytopenia: Due to chemotherapy. Much improved since oxaliplatin discontinued. # Cough: resolved. # Metastatic pancreatic carcinoma (MS-stable): 71year old was metastatic pancreatic carcinoma diagnosed 2/19.

## 2019-08-21 NOTE — PROGRESS NOTES
MD f/u for pancreatic ca. Due for C12 today. States energy level and appetite are good. He completed 1 week of PT and is using stationary bicycle at home. He also started working in the last couple of weeks because his energy level has improved so much.  Terril Dance

## 2019-08-23 ENCOUNTER — OFFICE VISIT (OUTPATIENT)
Dept: HEMATOLOGY/ONCOLOGY | Facility: HOSPITAL | Age: 69
End: 2019-08-23
Attending: INTERNAL MEDICINE
Payer: MEDICARE

## 2019-08-23 DIAGNOSIS — C78.7 LIVER METASTASES (HCC): ICD-10-CM

## 2019-08-23 DIAGNOSIS — C78.00 MALIGNANT NEOPLASM METASTATIC TO LUNG, UNSPECIFIED LATERALITY (HCC): ICD-10-CM

## 2019-08-23 DIAGNOSIS — C25.9 PANCREATIC CARCINOMA (HCC): Primary | ICD-10-CM

## 2019-08-23 PROCEDURE — 96372 THER/PROPH/DIAG INJ SC/IM: CPT

## 2019-08-23 PROCEDURE — 96523 IRRIG DRUG DELIVERY DEVICE: CPT

## 2019-08-30 ENCOUNTER — HOSPITAL ENCOUNTER (OUTPATIENT)
Dept: CT IMAGING | Facility: HOSPITAL | Age: 69
Discharge: HOME | End: 2019-08-30
Attending: INTERNAL MEDICINE
Payer: MEDICARE

## 2019-08-30 DIAGNOSIS — C78.7 LIVER METASTASES (HCC): ICD-10-CM

## 2019-08-30 DIAGNOSIS — C25.9 PANCREATIC CARCINOMA (HCC): ICD-10-CM

## 2019-08-30 DIAGNOSIS — R05.9 COUGH: ICD-10-CM

## 2019-08-30 PROCEDURE — 71260 CT THORAX DX C+: CPT | Performed by: INTERNAL MEDICINE

## 2019-08-30 PROCEDURE — 74177 CT ABD & PELVIS W/CONTRAST: CPT | Performed by: INTERNAL MEDICINE

## 2019-09-03 NOTE — PROGRESS NOTES
[facility] Progress Note      Patient Name:  Vickey Dunne  YOB: 1950  Medical Record Number:  [id number]    Date of visit: 9/4/2019    CHIEF COMPLAINT: Metastatic pancreatic carcinoma, daphne PE, L leg DVT.     HPI:     71year old th Rfl: 3   Ascorbic Acid (VITAMIN C) 1000 MG Oral Tab Take 1,000 mg by mouth daily. Disp:  Rfl:    pancrelipase, Lip-Prot-Amyl, 5000-37066 units Oral Cap DR Particles Take 1 capsule (5,000 Units total) by mouth 3 (three) times daily with meals.  Disp: 90 caps hemidiaphragm. 4 vs 9 mm left lower lobe nodule adjacent to the hemidiaphragm. Right cardio phrenic node measures 1.5 x 1.8 vs 1.6 x 2.1 cm. No new nodules appreciated. MEDIASTINUM:  Few small scattered nodes are stable. DEYVI:  No mass or adenopathy. 5/29/2019, bilateral pulmonary nodules and intrahepatic metastases have decreased in size.   Compared to most recent CT of the abdomen and pelvis  performed 5/6/2019, continued decrease in size of mass within the pancreatic tail as well as portal caval node uL    Metamyelocyte Absolute Manual 0.25 (H) 0 x10(3) uL    Neutrophils % Manual 75 %    Band % 1 %    Lymphocyte % Manual 14 %    Monocyte % Manual 5 %    Eosinophil % Manual 3 %    Basophil % Manual 0 %    Metamyelocyte % 2 %    Total Cells Counted 100

## 2019-09-04 ENCOUNTER — OFFICE VISIT (OUTPATIENT)
Dept: HEMATOLOGY/ONCOLOGY | Facility: HOSPITAL | Age: 69
End: 2019-09-04
Attending: INTERNAL MEDICINE
Payer: MEDICARE

## 2019-09-04 VITALS
BODY MASS INDEX: 29 KG/M2 | OXYGEN SATURATION: 98 % | DIASTOLIC BLOOD PRESSURE: 79 MMHG | SYSTOLIC BLOOD PRESSURE: 127 MMHG | TEMPERATURE: 98 F | HEART RATE: 64 BPM | WEIGHT: 192 LBS | RESPIRATION RATE: 18 BRPM

## 2019-09-04 DIAGNOSIS — C78.00 MALIGNANT NEOPLASM METASTATIC TO LUNG, UNSPECIFIED LATERALITY (HCC): ICD-10-CM

## 2019-09-04 DIAGNOSIS — C25.9 PANCREATIC CARCINOMA (HCC): Primary | ICD-10-CM

## 2019-09-04 DIAGNOSIS — C78.7 LIVER METASTASES (HCC): ICD-10-CM

## 2019-09-04 DIAGNOSIS — T45.1X5D ADVERSE EFFECT OF CHEMOTHERAPY, SUBSEQUENT ENCOUNTER: ICD-10-CM

## 2019-09-04 DIAGNOSIS — R53.0 NEOPLASTIC (MALIGNANT) RELATED FATIGUE: ICD-10-CM

## 2019-09-04 DIAGNOSIS — R63.0 ANOREXIA: ICD-10-CM

## 2019-09-04 LAB
ALBUMIN SERPL-MCNC: 3.5 G/DL (ref 3.4–5)
ALBUMIN/GLOB SERPL: 1.1 {RATIO} (ref 1–2)
ALP LIVER SERPL-CCNC: 243 U/L (ref 45–117)
ALT SERPL-CCNC: 33 U/L (ref 16–61)
ANION GAP SERPL CALC-SCNC: 6 MMOL/L (ref 0–18)
AST SERPL-CCNC: 28 U/L (ref 15–37)
BASOPHILS # BLD: 0 X10(3) UL (ref 0–0.2)
BASOPHILS NFR BLD: 0 %
BILIRUB SERPL-MCNC: 0.3 MG/DL (ref 0.1–2)
BUN BLD-MCNC: 13 MG/DL (ref 7–18)
BUN/CREAT SERPL: 16 (ref 10–20)
CALCIUM BLD-MCNC: 8.7 MG/DL (ref 8.5–10.1)
CANCER AG19-9 SERPL-ACNC: ABNORMAL U/ML (ref ?–37)
CHLORIDE SERPL-SCNC: 110 MMOL/L (ref 98–112)
CO2 SERPL-SCNC: 26 MMOL/L (ref 21–32)
CREAT BLD-MCNC: 0.81 MG/DL (ref 0.7–1.3)
DEPRECATED RDW RBC AUTO: 57 FL (ref 35.1–46.3)
EOSINOPHIL # BLD: 0.38 X10(3) UL (ref 0–0.7)
EOSINOPHIL NFR BLD: 3 %
ERYTHROCYTE [DISTWIDTH] IN BLOOD BY AUTOMATED COUNT: 15.6 % (ref 11–15)
GLOBULIN PLAS-MCNC: 3.2 G/DL (ref 2.8–4.4)
GLUCOSE BLD-MCNC: 178 MG/DL (ref 70–99)
HCT VFR BLD AUTO: 38.4 % (ref 39–53)
HGB BLD-MCNC: 12.5 G/DL (ref 13–17.5)
LYMPHOCYTES NFR BLD: 1.75 X10(3) UL (ref 1–4)
LYMPHOCYTES NFR BLD: 14 %
M PROTEIN MFR SERPL ELPH: 6.7 G/DL (ref 6.4–8.2)
MCH RBC QN AUTO: 32.6 PG (ref 26–34)
MCHC RBC AUTO-ENTMCNC: 32.6 G/DL (ref 31–37)
MCV RBC AUTO: 100.3 FL (ref 80–100)
METAMYELOCYTES # BLD: 0.25 X10(3) UL
METAMYELOCYTES NFR BLD: 2 %
MONOCYTES # BLD: 0.63 X10(3) UL (ref 0.1–1)
MONOCYTES NFR BLD: 5 %
MORPHOLOGY: NORMAL
NEUTROPHILS # BLD AUTO: 8.52 X10 (3) UL (ref 1.5–7.7)
NEUTROPHILS NFR BLD: 75 %
NEUTS BAND NFR BLD: 1 %
NEUTS HYPERSEG # BLD: 9.5 X10(3) UL (ref 1.5–7.7)
OSMOLALITY SERPL CALC.SUM OF ELEC: 299 MOSM/KG (ref 275–295)
PLATELET # BLD AUTO: 131 10(3)UL (ref 150–450)
PLATELET MORPHOLOGY: NORMAL
POTASSIUM SERPL-SCNC: 3.8 MMOL/L (ref 3.5–5.1)
RBC # BLD AUTO: 3.83 X10(6)UL (ref 3.8–5.8)
SODIUM SERPL-SCNC: 142 MMOL/L (ref 136–145)
TOTAL CELLS COUNTED: 100
WBC # BLD AUTO: 12.5 X10(3) UL (ref 4–11)

## 2019-09-04 PROCEDURE — 86301 IMMUNOASSAY TUMOR CA 19-9: CPT

## 2019-09-04 PROCEDURE — 85025 COMPLETE CBC W/AUTO DIFF WBC: CPT

## 2019-09-04 PROCEDURE — 96413 CHEMO IV INFUSION 1 HR: CPT

## 2019-09-04 PROCEDURE — 96411 CHEMO IV PUSH ADDL DRUG: CPT

## 2019-09-04 PROCEDURE — 85007 BL SMEAR W/DIFF WBC COUNT: CPT

## 2019-09-04 PROCEDURE — 80053 COMPREHEN METABOLIC PANEL: CPT

## 2019-09-04 PROCEDURE — 96375 TX/PRO/DX INJ NEW DRUG ADDON: CPT

## 2019-09-04 PROCEDURE — 96367 TX/PROPH/DG ADDL SEQ IV INF: CPT

## 2019-09-04 PROCEDURE — 96368 THER/DIAG CONCURRENT INF: CPT

## 2019-09-04 PROCEDURE — 99214 OFFICE O/P EST MOD 30 MIN: CPT | Performed by: INTERNAL MEDICINE

## 2019-09-04 PROCEDURE — 85027 COMPLETE CBC AUTOMATED: CPT

## 2019-09-04 RX ORDER — FLUOROURACIL 50 MG/ML
2400 INJECTION, SOLUTION INTRAVENOUS CONTINUOUS
Status: CANCELLED | OUTPATIENT
Start: 2019-09-04

## 2019-09-04 RX ORDER — PROCHLORPERAZINE MALEATE 10 MG
10 TABLET ORAL EVERY 6 HOURS PRN
Status: CANCELLED | OUTPATIENT
Start: 2019-09-04

## 2019-09-04 RX ORDER — LORAZEPAM 2 MG/ML
INJECTION INTRAMUSCULAR EVERY 4 HOURS PRN
Status: CANCELLED | OUTPATIENT
Start: 2019-09-04

## 2019-09-04 RX ORDER — ATROPINE SULFATE 0.4 MG/ML
0.2 AMPUL (ML) INJECTION ONCE
Status: CANCELLED | OUTPATIENT
Start: 2019-09-04

## 2019-09-04 RX ORDER — LORAZEPAM 0.5 MG/1
TABLET ORAL EVERY 4 HOURS PRN
Status: CANCELLED | OUTPATIENT
Start: 2019-09-04

## 2019-09-04 RX ORDER — FLUOROURACIL 50 MG/ML
2400 INJECTION, SOLUTION INTRAVENOUS CONTINUOUS
Status: DISCONTINUED | OUTPATIENT
Start: 2019-09-04 | End: 2019-09-04

## 2019-09-04 RX ORDER — FLUOROURACIL 50 MG/ML
400 INJECTION, SOLUTION INTRAVENOUS ONCE
Status: COMPLETED | OUTPATIENT
Start: 2019-09-04 | End: 2019-09-04

## 2019-09-04 RX ORDER — PANCRELIPASE LIPASE, PANCRELIPASE PROTEASE, PANCRELIPASE AMYLASE 5000; 17000; 24000 [USP'U]/1; [USP'U]/1; [USP'U]/1
CAPSULE, DELAYED RELEASE ORAL
Qty: 90 CAPSULE | Refills: 3 | OUTPATIENT
Start: 2019-09-04

## 2019-09-04 RX ORDER — FLUOROURACIL 50 MG/ML
400 INJECTION, SOLUTION INTRAVENOUS ONCE
Status: CANCELLED | OUTPATIENT
Start: 2019-09-04

## 2019-09-04 RX ORDER — ATROPINE SULFATE 0.4 MG/ML
0.2 AMPUL (ML) INJECTION ONCE
Status: COMPLETED | OUTPATIENT
Start: 2019-09-04 | End: 2019-09-04

## 2019-09-04 RX ORDER — METOCLOPRAMIDE HYDROCHLORIDE 5 MG/ML
10 INJECTION INTRAMUSCULAR; INTRAVENOUS EVERY 6 HOURS PRN
Status: CANCELLED | OUTPATIENT
Start: 2019-09-04

## 2019-09-04 RX ORDER — ONDANSETRON 2 MG/ML
8 INJECTION INTRAMUSCULAR; INTRAVENOUS EVERY 6 HOURS PRN
Status: CANCELLED | OUTPATIENT
Start: 2019-09-04

## 2019-09-04 RX ADMIN — FLUOROURACIL 800 MG: 50 INJECTION, SOLUTION INTRAVENOUS at 12:45:00

## 2019-09-04 RX ADMIN — ATROPINE SULFATE 0.2 MG: 0.4 MG/ML AMPUL (ML) INJECTION at 11:07:00

## 2019-09-04 RX ADMIN — FLUOROURACIL 4800 MG: 50 INJECTION, SOLUTION INTRAVENOUS at 12:58:00

## 2019-09-04 NOTE — PROGRESS NOTES
Pt here for C13D1.   Arrives Ambulating independently, accompanied by Family member           Patient denies possible pregnancy since last therapy cycle: {NOT APPLICABLE    Modifications in dose or schedule: No     Frequency of blood return and site check t

## 2019-09-04 NOTE — PROGRESS NOTES
2 week MD f/u for pancreatic ca. Due for C13 FOLFIRI. Reports decreased appetite d/t taste changes, but has been able to maintain weight. Has occasional constipation, but is manageable. Reports occasional MINA and when bending over.  Bilat ankle swelling has

## 2019-09-06 ENCOUNTER — OFFICE VISIT (OUTPATIENT)
Dept: HEMATOLOGY/ONCOLOGY | Facility: HOSPITAL | Age: 69
End: 2019-09-06
Attending: INTERNAL MEDICINE
Payer: MEDICARE

## 2019-09-06 DIAGNOSIS — C25.9 PANCREATIC CARCINOMA (HCC): Primary | ICD-10-CM

## 2019-09-06 DIAGNOSIS — C78.00 MALIGNANT NEOPLASM METASTATIC TO LUNG, UNSPECIFIED LATERALITY (HCC): ICD-10-CM

## 2019-09-06 DIAGNOSIS — C78.7 LIVER METASTASES (HCC): ICD-10-CM

## 2019-09-06 PROCEDURE — 96372 THER/PROPH/DIAG INJ SC/IM: CPT

## 2019-09-09 NOTE — TELEPHONE ENCOUNTER
Last refill on Atorvastatin #90 with 3 refills on 12 10 2018  Last OV on 2 25 2019   No future appointments

## 2019-09-09 NOTE — TELEPHONE ENCOUNTER
----- Message from Lise Jalloh sent at 9/9/2019  4:51 PM CDT -----  Regarding: Prescription Question  Contact: [phone]  Good afternoon,  Dr. Carrie Santiago    My dad needs refill for Lipitor    Pharmacy has requested refills also    Help    - Fab Adams

## 2019-09-10 RX ORDER — ATORVASTATIN CALCIUM 20 MG/1
TABLET, FILM COATED ORAL
Qty: 90 TABLET | Refills: 3 | OUTPATIENT
Start: 2019-09-10

## 2019-09-10 RX ORDER — ATORVASTATIN CALCIUM 20 MG/1
TABLET, FILM COATED ORAL
Qty: 90 TABLET | Refills: 3 | Status: SHIPPED | OUTPATIENT
Start: 2019-09-10 | End: 2020-01-01

## 2019-09-17 NOTE — PROGRESS NOTES
[facility] Progress Note      Patient Name:  Jaime German  YOB: 1950  Medical Record Number:  [id number]    Date of visit: 9/18/2019    CHIEF COMPLAINT: Metastatic pancreatic carcinoma, daphne PE, L leg DVT.     HPI:     71year old t by mouth 3 (three) times daily with meals. Disp: 90 capsule Rfl: 3   Cholecalciferol (VITAMIN D3 OR) Take by mouth. Disp:  Rfl:    Cyanocobalamin (B-12 OR) Take by mouth.  Disp:  Rfl:    NON FORMULARY  Disp:  Rfl:    NON FORMULARY  Disp:  Rfl:    NON FORMUL 150.0 - 450.0 10(3)uL    .4 (H) 80.0 - 100.0 fL    MCH 32.2 26.0 - 34.0 pg    MCHC 31.7 31.0 - 37.0 g/dL    RDW 15.9 (H) 11.0 - 15.0 %    RDW-SD 58.8 (H) 35.1 - 46.3 fL    Neutrophil Absolute Prelim 11.28 (H) 1.50 - 7.70 x10 (3) uL   MANUAL DIFFEREN marijuana. # Hypokalemia: normal, may stay off K    # Leg weakness: better with physical therapy, he is working a bit to keep himself occupied. # Elevated liver enzymes: resolved. # Lower ext edema: dependent, better with compression socks.      Go

## 2019-09-18 ENCOUNTER — OFFICE VISIT (OUTPATIENT)
Dept: HEMATOLOGY/ONCOLOGY | Facility: HOSPITAL | Age: 69
End: 2019-09-18
Attending: INTERNAL MEDICINE
Payer: MEDICARE

## 2019-09-18 VITALS
BODY MASS INDEX: 29.1 KG/M2 | RESPIRATION RATE: 16 BRPM | SYSTOLIC BLOOD PRESSURE: 119 MMHG | TEMPERATURE: 98 F | HEIGHT: 68 IN | WEIGHT: 192 LBS | HEART RATE: 57 BPM | DIASTOLIC BLOOD PRESSURE: 72 MMHG

## 2019-09-18 DIAGNOSIS — T45.1X5D ADVERSE EFFECT OF CHEMOTHERAPY, SUBSEQUENT ENCOUNTER: ICD-10-CM

## 2019-09-18 DIAGNOSIS — C25.9 PANCREATIC CARCINOMA (HCC): Primary | ICD-10-CM

## 2019-09-18 DIAGNOSIS — I26.99 BILATERAL PULMONARY EMBOLISM (HCC): ICD-10-CM

## 2019-09-18 DIAGNOSIS — C78.7 LIVER METASTASES (HCC): ICD-10-CM

## 2019-09-18 DIAGNOSIS — C78.00 MALIGNANT NEOPLASM METASTATIC TO LUNG, UNSPECIFIED LATERALITY (HCC): ICD-10-CM

## 2019-09-18 DIAGNOSIS — R05.9 COUGH: ICD-10-CM

## 2019-09-18 DIAGNOSIS — R29.898 WEAKNESS OF BOTH LOWER EXTREMITIES: ICD-10-CM

## 2019-09-18 LAB
ALBUMIN SERPL-MCNC: 3.7 G/DL (ref 3.4–5)
ALBUMIN/GLOB SERPL: 1.2 {RATIO} (ref 1–2)
ALP LIVER SERPL-CCNC: 236 U/L (ref 45–117)
ALT SERPL-CCNC: 42 U/L (ref 16–61)
ANION GAP SERPL CALC-SCNC: 4 MMOL/L (ref 0–18)
AST SERPL-CCNC: 36 U/L (ref 15–37)
BASOPHILS # BLD: 0 X10(3) UL (ref 0–0.2)
BASOPHILS NFR BLD: 0 %
BILIRUB SERPL-MCNC: 0.3 MG/DL (ref 0.1–2)
BUN BLD-MCNC: 11 MG/DL (ref 7–18)
BUN/CREAT SERPL: 12 (ref 10–20)
CALCIUM BLD-MCNC: 9.1 MG/DL (ref 8.5–10.1)
CANCER AG19-9 SERPL-ACNC: 9746.1 U/ML (ref ?–37)
CHLORIDE SERPL-SCNC: 108 MMOL/L (ref 98–112)
CO2 SERPL-SCNC: 29 MMOL/L (ref 21–32)
CREAT BLD-MCNC: 0.92 MG/DL (ref 0.7–1.3)
DEPRECATED RDW RBC AUTO: 58.8 FL (ref 35.1–46.3)
EOSINOPHIL # BLD: 0.17 X10(3) UL (ref 0–0.7)
EOSINOPHIL NFR BLD: 1 %
ERYTHROCYTE [DISTWIDTH] IN BLOOD BY AUTOMATED COUNT: 15.9 % (ref 11–15)
GLOBULIN PLAS-MCNC: 3 G/DL (ref 2.8–4.4)
GLUCOSE BLD-MCNC: 118 MG/DL (ref 70–99)
HCT VFR BLD AUTO: 37.2 % (ref 39–53)
HGB BLD-MCNC: 11.8 G/DL (ref 13–17.5)
LYMPHOCYTES NFR BLD: 12 %
LYMPHOCYTES NFR BLD: 2.08 X10(3) UL (ref 1–4)
M PROTEIN MFR SERPL ELPH: 6.7 G/DL (ref 6.4–8.2)
MCH RBC QN AUTO: 32.2 PG (ref 26–34)
MCHC RBC AUTO-ENTMCNC: 31.7 G/DL (ref 31–37)
MCV RBC AUTO: 101.4 FL (ref 80–100)
METAMYELOCYTES # BLD: 1.04 X10(3) UL
METAMYELOCYTES NFR BLD: 6 %
MONOCYTES # BLD: 0.69 X10(3) UL (ref 0.1–1)
MONOCYTES NFR BLD: 4 %
MORPHOLOGY: NORMAL
MYELOCYTES # BLD: 0.17 X10(3) UL
MYELOCYTES NFR BLD: 1 %
NEUTROPHILS # BLD AUTO: 11.28 X10 (3) UL (ref 1.5–7.7)
NEUTROPHILS NFR BLD: 59 %
NEUTS BAND NFR BLD: 17 %
NEUTS HYPERSEG # BLD: 13.15 X10(3) UL (ref 1.5–7.7)
OSMOLALITY SERPL CALC.SUM OF ELEC: 292 MOSM/KG (ref 275–295)
PLATELET # BLD AUTO: 102 10(3)UL (ref 150–450)
PLATELET MORPHOLOGY: NORMAL
POTASSIUM SERPL-SCNC: 3.8 MMOL/L (ref 3.5–5.1)
RBC # BLD AUTO: 3.67 X10(6)UL (ref 3.8–5.8)
SODIUM SERPL-SCNC: 141 MMOL/L (ref 136–145)
TOTAL CELLS COUNTED: 100
WBC # BLD AUTO: 17.3 X10(3) UL (ref 4–11)

## 2019-09-18 PROCEDURE — 86301 IMMUNOASSAY TUMOR CA 19-9: CPT

## 2019-09-18 PROCEDURE — 80053 COMPREHEN METABOLIC PANEL: CPT

## 2019-09-18 PROCEDURE — 85007 BL SMEAR W/DIFF WBC COUNT: CPT

## 2019-09-18 PROCEDURE — 85027 COMPLETE CBC AUTOMATED: CPT

## 2019-09-18 PROCEDURE — 96375 TX/PRO/DX INJ NEW DRUG ADDON: CPT

## 2019-09-18 PROCEDURE — 99214 OFFICE O/P EST MOD 30 MIN: CPT | Performed by: INTERNAL MEDICINE

## 2019-09-18 PROCEDURE — 96368 THER/DIAG CONCURRENT INF: CPT

## 2019-09-18 PROCEDURE — 96411 CHEMO IV PUSH ADDL DRUG: CPT

## 2019-09-18 PROCEDURE — 96413 CHEMO IV INFUSION 1 HR: CPT

## 2019-09-18 PROCEDURE — 96367 TX/PROPH/DG ADDL SEQ IV INF: CPT

## 2019-09-18 PROCEDURE — 85025 COMPLETE CBC W/AUTO DIFF WBC: CPT

## 2019-09-18 PROCEDURE — 96366 THER/PROPH/DIAG IV INF ADDON: CPT

## 2019-09-18 RX ORDER — ONDANSETRON 2 MG/ML
8 INJECTION INTRAMUSCULAR; INTRAVENOUS EVERY 6 HOURS PRN
Status: CANCELLED | OUTPATIENT
Start: 2019-09-18

## 2019-09-18 RX ORDER — FLUOROURACIL 50 MG/ML
2400 INJECTION, SOLUTION INTRAVENOUS CONTINUOUS
Status: DISCONTINUED | OUTPATIENT
Start: 2019-09-18 | End: 2019-09-18

## 2019-09-18 RX ORDER — ATROPINE SULFATE 0.4 MG/ML
0.2 AMPUL (ML) INJECTION ONCE
Status: CANCELLED | OUTPATIENT
Start: 2019-09-18

## 2019-09-18 RX ORDER — FLUOROURACIL 50 MG/ML
400 INJECTION, SOLUTION INTRAVENOUS ONCE
Status: COMPLETED | OUTPATIENT
Start: 2019-09-18 | End: 2019-09-18

## 2019-09-18 RX ORDER — ATROPINE SULFATE 0.4 MG/ML
0.2 AMPUL (ML) INJECTION ONCE
Status: COMPLETED | OUTPATIENT
Start: 2019-09-18 | End: 2019-09-18

## 2019-09-18 RX ORDER — FLUOROURACIL 50 MG/ML
400 INJECTION, SOLUTION INTRAVENOUS ONCE
Status: CANCELLED | OUTPATIENT
Start: 2019-09-18

## 2019-09-18 RX ORDER — PROCHLORPERAZINE MALEATE 10 MG
10 TABLET ORAL EVERY 6 HOURS PRN
Status: CANCELLED | OUTPATIENT
Start: 2019-09-18

## 2019-09-18 RX ORDER — LORAZEPAM 0.5 MG/1
TABLET ORAL EVERY 4 HOURS PRN
Status: CANCELLED | OUTPATIENT
Start: 2019-09-18

## 2019-09-18 RX ORDER — METOCLOPRAMIDE HYDROCHLORIDE 5 MG/ML
10 INJECTION INTRAMUSCULAR; INTRAVENOUS EVERY 6 HOURS PRN
Status: CANCELLED | OUTPATIENT
Start: 2019-09-18

## 2019-09-18 RX ORDER — FLUOROURACIL 50 MG/ML
2400 INJECTION, SOLUTION INTRAVENOUS CONTINUOUS
Status: CANCELLED | OUTPATIENT
Start: 2019-09-18

## 2019-09-18 RX ORDER — LORAZEPAM 2 MG/ML
INJECTION INTRAMUSCULAR EVERY 4 HOURS PRN
Status: CANCELLED | OUTPATIENT
Start: 2019-09-18

## 2019-09-18 RX ADMIN — FLUOROURACIL 4800 MG: 50 INJECTION, SOLUTION INTRAVENOUS at 13:42:00

## 2019-09-18 RX ADMIN — FLUOROURACIL 800 MG: 50 INJECTION, SOLUTION INTRAVENOUS at 13:35:00

## 2019-09-18 RX ADMIN — ATROPINE SULFATE 0.2 MG: 0.4 MG/ML AMPUL (ML) INJECTION at 11:30:00

## 2019-09-18 NOTE — PROGRESS NOTES
Pt here for C14D1 FOLFIRI.   Arrives Ambulating independently, accompanied by Family member           Patient denies possible pregnancy since last therapy cycle: Not Applicable    Modifications in dose or schedule: No     Frequency of blood return and site

## 2019-09-18 NOTE — PROGRESS NOTES
MD f/u for pancreatic ca. Due for C14 FOLFIRI today. Reports that he is feeling well. He is working a lot and states that is when he feels his best. Energy level and appetite has improved, but does experience lows when he is at home.  Still has occasional c

## 2019-09-20 ENCOUNTER — OFFICE VISIT (OUTPATIENT)
Dept: HEMATOLOGY/ONCOLOGY | Facility: HOSPITAL | Age: 69
End: 2019-09-20
Attending: INTERNAL MEDICINE
Payer: MEDICARE

## 2019-09-20 DIAGNOSIS — C78.7 LIVER METASTASES (HCC): ICD-10-CM

## 2019-09-20 DIAGNOSIS — C78.00 MALIGNANT NEOPLASM METASTATIC TO LUNG, UNSPECIFIED LATERALITY (HCC): ICD-10-CM

## 2019-09-20 DIAGNOSIS — C25.9 PANCREATIC CARCINOMA (HCC): Primary | ICD-10-CM

## 2019-09-20 PROCEDURE — 96372 THER/PROPH/DIAG INJ SC/IM: CPT

## 2019-10-01 NOTE — PROGRESS NOTES
[facility] Progress Note      Patient Name:  Yolanda Curran  YOB: 1950  Medical Record Number:  [id number]    Date of visit: 10/2/2019    CHIEF COMPLAINT: Metastatic pancreatic carcinoma, daphne PE, L leg DVT.     HPI:     71year old t by mouth 3 (three) times daily with meals. Disp: 90 capsule Rfl: 3   Cholecalciferol (VITAMIN D3 OR) Take by mouth. Disp:  Rfl:    Cyanocobalamin (B-12 OR) Take by mouth.  Disp:  Rfl:    NON FORMULARY  Disp:  Rfl:    NON FORMULARY  Disp:  Rfl:    NON FORMUL FOLFIRINOX on 3/12/19. Restaging after 12 cycles-continued response. Proceed with cycle # 15. Given good response, low plt and for qol, Oxaliplatin held after cycle # 7. He is aware that disease is incurable. Genetic testing negative.      # L leg DVT/b

## 2019-10-02 ENCOUNTER — APPOINTMENT (OUTPATIENT)
Dept: HEMATOLOGY/ONCOLOGY | Facility: HOSPITAL | Age: 69
End: 2019-10-02
Attending: INTERNAL MEDICINE
Payer: MEDICARE

## 2019-10-04 RX ORDER — APIXABAN 5 MG/1
TABLET, FILM COATED ORAL
Qty: 60 TABLET | Refills: 6 | OUTPATIENT
Start: 2019-10-04

## 2019-10-08 NOTE — PROGRESS NOTES
[facility] Progress Note      Patient Name:  Paz Lincoln  YOB: 1950  Medical Record Number:  [id number]    Date of visit: 10/9/2019    CHIEF COMPLAINT: Metastatic pancreatic carcinoma, daphne PE, L leg DVT.     HPI:     71year old t 60 tablet Rfl: 6   ATORVASTATIN 20 MG Oral Tab TAKE 1 TABLET BY MOUTH EVERY DAY AT BEDTIME Disp: 90 tablet Rfl: 3   pancrelipase, Lip-Prot-Amyl, 5000-81354 units Oral Cap DR Particles Take 1 capsule (5,000 Units total) by mouth 3 (three) times daily with m Value Ref Range    Glucose 131 (H) 70 - 99 mg/dL    Sodium 140 136 - 145 mmol/L    Potassium 3.7 3.5 - 5.1 mmol/L    Chloride 106 98 - 112 mmol/L    CO2 27.0 21.0 - 32.0 mmol/L    Anion Gap 7 0 - 18 mmol/L    BUN 14 7 - 18 mg/dL    Creatinine 0.87 0.70 - 1 with cycle # 15. Given good response, low plt and for qol, Oxaliplatin held after cycle # 7. He is aware that disease is incurable. Genetic testing negative.      # L leg DVT/daphne PE: jonel provoked due to bus trip to [facility].  However given metastatic pa

## 2019-10-09 ENCOUNTER — OFFICE VISIT (OUTPATIENT)
Dept: HEMATOLOGY/ONCOLOGY | Facility: HOSPITAL | Age: 69
End: 2019-10-09
Attending: INTERNAL MEDICINE
Payer: MEDICARE

## 2019-10-09 VITALS
TEMPERATURE: 98 F | SYSTOLIC BLOOD PRESSURE: 124 MMHG | BODY MASS INDEX: 30.34 KG/M2 | HEIGHT: 67.99 IN | DIASTOLIC BLOOD PRESSURE: 73 MMHG | WEIGHT: 200.19 LBS | OXYGEN SATURATION: 97 % | HEART RATE: 60 BPM | RESPIRATION RATE: 16 BRPM

## 2019-10-09 DIAGNOSIS — I26.99 BILATERAL PULMONARY EMBOLISM (HCC): ICD-10-CM

## 2019-10-09 DIAGNOSIS — C78.7 LIVER METASTASES (HCC): ICD-10-CM

## 2019-10-09 DIAGNOSIS — C78.00 MALIGNANT NEOPLASM METASTATIC TO LUNG, UNSPECIFIED LATERALITY (HCC): ICD-10-CM

## 2019-10-09 DIAGNOSIS — R53.0 NEOPLASTIC (MALIGNANT) RELATED FATIGUE: ICD-10-CM

## 2019-10-09 DIAGNOSIS — C25.9 PANCREATIC CARCINOMA (HCC): Primary | ICD-10-CM

## 2019-10-09 DIAGNOSIS — I82.412 ACUTE DEEP VEIN THROMBOSIS (DVT) OF FEMORAL VEIN OF LEFT LOWER EXTREMITY (HCC): ICD-10-CM

## 2019-10-09 DIAGNOSIS — C78.00 MALIGNANT NEOPLASM METASTATIC TO LUNG, UNSPECIFIED LATERALITY (HCC): Primary | ICD-10-CM

## 2019-10-09 PROCEDURE — 96415 CHEMO IV INFUSION ADDL HR: CPT

## 2019-10-09 PROCEDURE — 96375 TX/PRO/DX INJ NEW DRUG ADDON: CPT

## 2019-10-09 PROCEDURE — 86301 IMMUNOASSAY TUMOR CA 19-9: CPT

## 2019-10-09 PROCEDURE — 96367 TX/PROPH/DG ADDL SEQ IV INF: CPT

## 2019-10-09 PROCEDURE — 96413 CHEMO IV INFUSION 1 HR: CPT

## 2019-10-09 PROCEDURE — 99214 OFFICE O/P EST MOD 30 MIN: CPT | Performed by: INTERNAL MEDICINE

## 2019-10-09 PROCEDURE — 96411 CHEMO IV PUSH ADDL DRUG: CPT

## 2019-10-09 PROCEDURE — 96368 THER/DIAG CONCURRENT INF: CPT

## 2019-10-09 PROCEDURE — 80053 COMPREHEN METABOLIC PANEL: CPT

## 2019-10-09 PROCEDURE — 85025 COMPLETE CBC W/AUTO DIFF WBC: CPT

## 2019-10-09 RX ORDER — ATROPINE SULFATE 0.4 MG/ML
0.2 AMPUL (ML) INJECTION ONCE
Status: COMPLETED | OUTPATIENT
Start: 2019-10-09 | End: 2019-10-09

## 2019-10-09 RX ORDER — FLUOROURACIL 50 MG/ML
400 INJECTION, SOLUTION INTRAVENOUS ONCE
Status: COMPLETED | OUTPATIENT
Start: 2019-10-09 | End: 2019-10-09

## 2019-10-09 RX ORDER — LORAZEPAM 0.5 MG/1
TABLET ORAL EVERY 4 HOURS PRN
Status: CANCELLED | OUTPATIENT
Start: 2019-10-09

## 2019-10-09 RX ORDER — FLUOROURACIL 50 MG/ML
2400 INJECTION, SOLUTION INTRAVENOUS CONTINUOUS
Status: CANCELLED | OUTPATIENT
Start: 2019-10-09

## 2019-10-09 RX ORDER — ATROPINE SULFATE 0.4 MG/ML
0.2 AMPUL (ML) INJECTION ONCE
Status: CANCELLED | OUTPATIENT
Start: 2019-10-09

## 2019-10-09 RX ORDER — OLANZAPINE 2.5 MG/1
TABLET ORAL NIGHTLY
Qty: 60 TABLET | Refills: 3 | Status: SHIPPED | OUTPATIENT
Start: 2019-10-09 | End: 2020-01-01

## 2019-10-09 RX ORDER — FLUOROURACIL 50 MG/ML
400 INJECTION, SOLUTION INTRAVENOUS ONCE
Status: CANCELLED | OUTPATIENT
Start: 2019-10-09

## 2019-10-09 RX ORDER — ONDANSETRON 2 MG/ML
8 INJECTION INTRAMUSCULAR; INTRAVENOUS EVERY 6 HOURS PRN
Status: CANCELLED | OUTPATIENT
Start: 2019-10-09

## 2019-10-09 RX ORDER — LORAZEPAM 2 MG/ML
INJECTION INTRAMUSCULAR EVERY 4 HOURS PRN
Status: CANCELLED | OUTPATIENT
Start: 2019-10-09

## 2019-10-09 RX ORDER — FLUOROURACIL 50 MG/ML
2400 INJECTION, SOLUTION INTRAVENOUS CONTINUOUS
Status: DISCONTINUED | OUTPATIENT
Start: 2019-10-09 | End: 2019-10-09

## 2019-10-09 RX ORDER — METOCLOPRAMIDE HYDROCHLORIDE 5 MG/ML
10 INJECTION INTRAMUSCULAR; INTRAVENOUS EVERY 6 HOURS PRN
Status: CANCELLED | OUTPATIENT
Start: 2019-10-09

## 2019-10-09 RX ORDER — PROCHLORPERAZINE MALEATE 10 MG
10 TABLET ORAL EVERY 6 HOURS PRN
Status: CANCELLED | OUTPATIENT
Start: 2019-10-09

## 2019-10-09 RX ADMIN — ATROPINE SULFATE 0.2 MG: 0.4 MG/ML AMPUL (ML) INJECTION at 11:47:00

## 2019-10-09 RX ADMIN — FLUOROURACIL 800 MG: 50 INJECTION, SOLUTION INTRAVENOUS at 14:30:00

## 2019-10-09 RX ADMIN — FLUOROURACIL 4800 MG: 50 INJECTION, SOLUTION INTRAVENOUS at 14:37:00

## 2019-10-09 NOTE — PROGRESS NOTES
Pt here for C15D1.   Arrives Ambulating independently, accompanied by Family member           Patient denies possible pregnancy since last therapy cycle: Not Applicable    Modifications in dose or schedule: No     Frequency of blood return and site check th

## 2019-10-09 NOTE — PROGRESS NOTES
MD f/u for pancreatic ca. Due for C15 FOLFIRI today; delayed x1 week for trip to [facility]. Reports new pain to RLE after 10+ hour car ride. States he has not missed any doses of eliquis. Denies any SOB.  Pt does have some constipation and daughter states he w

## 2019-10-11 ENCOUNTER — OFFICE VISIT (OUTPATIENT)
Dept: HEMATOLOGY/ONCOLOGY | Facility: HOSPITAL | Age: 69
End: 2019-10-11
Attending: INTERNAL MEDICINE
Payer: MEDICARE

## 2019-10-11 DIAGNOSIS — C78.7 LIVER METASTASES (HCC): ICD-10-CM

## 2019-10-11 DIAGNOSIS — C78.00 MALIGNANT NEOPLASM METASTATIC TO LUNG, UNSPECIFIED LATERALITY (HCC): ICD-10-CM

## 2019-10-11 DIAGNOSIS — C25.9 PANCREATIC CARCINOMA (HCC): Primary | ICD-10-CM

## 2019-10-11 PROCEDURE — 96372 THER/PROPH/DIAG INJ SC/IM: CPT

## 2019-10-23 ENCOUNTER — OFFICE VISIT (OUTPATIENT)
Dept: HEMATOLOGY/ONCOLOGY | Facility: HOSPITAL | Age: 69
End: 2019-10-23
Attending: INTERNAL MEDICINE
Payer: MEDICARE

## 2019-10-23 VITALS
WEIGHT: 198 LBS | SYSTOLIC BLOOD PRESSURE: 133 MMHG | RESPIRATION RATE: 16 BRPM | DIASTOLIC BLOOD PRESSURE: 73 MMHG | HEART RATE: 66 BPM | TEMPERATURE: 96 F | HEIGHT: 67.99 IN | OXYGEN SATURATION: 97 % | BODY MASS INDEX: 30.01 KG/M2

## 2019-10-23 DIAGNOSIS — C25.9 PANCREATIC CARCINOMA (HCC): Primary | ICD-10-CM

## 2019-10-23 DIAGNOSIS — R05.9 COUGH: ICD-10-CM

## 2019-10-23 DIAGNOSIS — I26.99 BILATERAL PULMONARY EMBOLISM (HCC): ICD-10-CM

## 2019-10-23 DIAGNOSIS — C78.00 MALIGNANT NEOPLASM METASTATIC TO LUNG, UNSPECIFIED LATERALITY (HCC): ICD-10-CM

## 2019-10-23 DIAGNOSIS — R53.0 NEOPLASTIC (MALIGNANT) RELATED FATIGUE: ICD-10-CM

## 2019-10-23 DIAGNOSIS — T45.1X5D ADVERSE EFFECT OF CHEMOTHERAPY, SUBSEQUENT ENCOUNTER: ICD-10-CM

## 2019-10-23 DIAGNOSIS — C78.7 LIVER METASTASES (HCC): ICD-10-CM

## 2019-10-23 PROCEDURE — 99214 OFFICE O/P EST MOD 30 MIN: CPT | Performed by: INTERNAL MEDICINE

## 2019-10-23 PROCEDURE — 80053 COMPREHEN METABOLIC PANEL: CPT

## 2019-10-23 PROCEDURE — 85025 COMPLETE CBC W/AUTO DIFF WBC: CPT

## 2019-10-23 PROCEDURE — 96411 CHEMO IV PUSH ADDL DRUG: CPT

## 2019-10-23 PROCEDURE — 96375 TX/PRO/DX INJ NEW DRUG ADDON: CPT

## 2019-10-23 PROCEDURE — 96413 CHEMO IV INFUSION 1 HR: CPT

## 2019-10-23 PROCEDURE — 96368 THER/DIAG CONCURRENT INF: CPT

## 2019-10-23 PROCEDURE — 96367 TX/PROPH/DG ADDL SEQ IV INF: CPT

## 2019-10-23 PROCEDURE — 86301 IMMUNOASSAY TUMOR CA 19-9: CPT

## 2019-10-23 RX ORDER — ATROPINE SULFATE 0.4 MG/ML
0.2 AMPUL (ML) INJECTION ONCE
Status: COMPLETED | OUTPATIENT
Start: 2019-10-23 | End: 2019-10-23

## 2019-10-23 RX ORDER — FLUOROURACIL 50 MG/ML
2400 INJECTION, SOLUTION INTRAVENOUS CONTINUOUS
Status: CANCELLED | OUTPATIENT
Start: 2019-10-23

## 2019-10-23 RX ORDER — FLUOROURACIL 50 MG/ML
2400 INJECTION, SOLUTION INTRAVENOUS CONTINUOUS
Status: DISCONTINUED | OUTPATIENT
Start: 2019-10-23 | End: 2019-10-23

## 2019-10-23 RX ORDER — FLUOROURACIL 50 MG/ML
400 INJECTION, SOLUTION INTRAVENOUS ONCE
Status: CANCELLED | OUTPATIENT
Start: 2019-10-23

## 2019-10-23 RX ORDER — LORAZEPAM 0.5 MG/1
TABLET ORAL EVERY 4 HOURS PRN
Status: CANCELLED | OUTPATIENT
Start: 2019-10-23

## 2019-10-23 RX ORDER — ATROPINE SULFATE 0.4 MG/ML
0.2 AMPUL (ML) INJECTION ONCE
Status: CANCELLED | OUTPATIENT
Start: 2019-10-23

## 2019-10-23 RX ORDER — FLUOROURACIL 50 MG/ML
400 INJECTION, SOLUTION INTRAVENOUS ONCE
Status: COMPLETED | OUTPATIENT
Start: 2019-10-23 | End: 2019-10-23

## 2019-10-23 RX ORDER — ONDANSETRON 2 MG/ML
8 INJECTION INTRAMUSCULAR; INTRAVENOUS EVERY 6 HOURS PRN
Status: CANCELLED | OUTPATIENT
Start: 2019-10-23

## 2019-10-23 RX ORDER — PROCHLORPERAZINE MALEATE 10 MG
10 TABLET ORAL EVERY 6 HOURS PRN
Status: CANCELLED | OUTPATIENT
Start: 2019-10-23

## 2019-10-23 RX ORDER — LORAZEPAM 2 MG/ML
INJECTION INTRAMUSCULAR EVERY 4 HOURS PRN
Status: CANCELLED | OUTPATIENT
Start: 2019-10-23

## 2019-10-23 RX ORDER — METOCLOPRAMIDE HYDROCHLORIDE 5 MG/ML
10 INJECTION INTRAMUSCULAR; INTRAVENOUS EVERY 6 HOURS PRN
Status: CANCELLED | OUTPATIENT
Start: 2019-10-23

## 2019-10-23 RX ADMIN — FLUOROURACIL 4800 MG: 50 INJECTION, SOLUTION INTRAVENOUS at 13:53:00

## 2019-10-23 RX ADMIN — ATROPINE SULFATE 0.2 MG: 0.4 MG/ML AMPUL (ML) INJECTION at 11:55:00

## 2019-10-23 RX ADMIN — FLUOROURACIL 800 MG: 50 INJECTION, SOLUTION INTRAVENOUS at 13:45:00

## 2019-10-23 NOTE — PROGRESS NOTES
[facility] Progress Note      Patient Name:  Yolanda Curran  YOB: 1950  Medical Record Number:  [id number]    Date of visit: 10/23/2019    CHIEF COMPLAINT: Metastatic pancreatic carcinoma, daphne PE, L leg DVT.     HPI:     71year old Take 1 tablet (5 mg total) by mouth 2 (two) times daily. , Disp: 60 tablet, Rfl: 6  ATORVASTATIN 20 MG Oral Tab, TAKE 1 TABLET BY MOUTH EVERY DAY AT BEDTIME, Disp: 90 tablet, Rfl: 3  pancrelipase, Lip-Prot-Amyl, 5000-35441 units Oral Cap DR Particles, Take <=37.0 U/mL   COMP METABOLIC PANEL (14)    Collection Time: 10/23/19  9:38 AM   Result Value Ref Range    Glucose 110 (H) 70 - 99 mg/dL    Sodium 141 136 - 145 mmol/L    Potassium 3.7 3.5 - 5.1 mmol/L    Chloride 107 98 - 112 mmol/L    CO2 27.0 21.0 - 32. 0 with FOLFIRINOX on 3/12/19. Restaging after 12 cycles-continued response. Proceed with cycle # 16. Given good response, low plt and for qol, Oxaliplatin held after cycle # 7. He is aware that disease is incurable. Genetic testing negative.      # L leg

## 2019-10-23 NOTE — PROGRESS NOTES
Pt here for C16D1 FOLFIRI.   Arrives Ambulating independently, accompanied by Family member           Patient denies possible pregnancy since last therapy cycle: Not Applicable    Modifications in dose or schedule: No     Frequency of blood return and site

## 2019-10-23 NOTE — PROGRESS NOTES
MD f/u for pancreatic ca;C16 FOLFIRI expected. Reports that he is feeling well. Has some lingering fatigue, but continues to work and exercise. Has occasional constipation that he is able to manage with stool softeners.  Still experiencing neuropathy to fin

## 2019-10-25 ENCOUNTER — NURSE ONLY (OUTPATIENT)
Dept: HEMATOLOGY/ONCOLOGY | Facility: HOSPITAL | Age: 69
End: 2019-10-25
Attending: INTERNAL MEDICINE
Payer: MEDICARE

## 2019-10-25 DIAGNOSIS — R06.2 WHEEZING: ICD-10-CM

## 2019-10-25 DIAGNOSIS — C25.9 PANCREATIC CARCINOMA (HCC): Primary | ICD-10-CM

## 2019-10-25 DIAGNOSIS — C78.7 LIVER METASTASES (HCC): ICD-10-CM

## 2019-10-25 DIAGNOSIS — C78.00 MALIGNANT NEOPLASM METASTATIC TO LUNG, UNSPECIFIED LATERALITY (HCC): ICD-10-CM

## 2019-10-25 PROCEDURE — 96372 THER/PROPH/DIAG INJ SC/IM: CPT

## 2019-10-25 RX ORDER — SODIUM CHLORIDE 0.9 % (FLUSH) 0.9 %
10 SYRINGE (ML) INJECTION ONCE
Status: CANCELLED | OUTPATIENT
Start: 2019-10-25

## 2019-10-25 RX ORDER — SODIUM CHLORIDE 0.9 % (FLUSH) 0.9 %
10 SYRINGE (ML) INJECTION ONCE
Status: COMPLETED | OUTPATIENT
Start: 2019-10-25 | End: 2019-10-25

## 2019-10-25 RX ADMIN — SODIUM CHLORIDE 0.9 % (FLUSH) 10 ML: 0.9 % SYRINGE (ML) INJECTION at 12:12:00

## 2019-11-05 NOTE — PROGRESS NOTES
[facility] Progress Note      Patient Name:  Leida Brown  YOB: 1950  Medical Record Number:  [id number]    Date of visit: 11/6/2019    CHIEF COMPLAINT: Metastatic pancreatic carcinoma, daphne PE, L leg DVT.     HPI:     71year old t • apixaban (ELIQUIS) 5 MG Oral Tab Take 1 tablet (5 mg total) by mouth 2 (two) times daily.  60 tablet 6   • ATORVASTATIN 20 MG Oral Tab TAKE 1 TABLET BY MOUTH EVERY DAY AT BEDTIME 90 tablet 3   • pancrelipase, Lip-Prot-Amyl, 5000-04944 units Oral Cap  low plt and for qol, Oxaliplatin held after cycle # 7. He is aware that disease is incurable. Genetic testing negative. Restaging this week. Going to [facility] for 4-6 weeks.     # L leg DVT/daphne PE: jonel provoked due to bus trip to [facility].  However given

## 2019-11-06 ENCOUNTER — OFFICE VISIT (OUTPATIENT)
Dept: HEMATOLOGY/ONCOLOGY | Facility: HOSPITAL | Age: 69
End: 2019-11-06
Attending: INTERNAL MEDICINE
Payer: MEDICARE

## 2019-11-06 VITALS
WEIGHT: 200 LBS | OXYGEN SATURATION: 99 % | HEART RATE: 60 BPM | BODY MASS INDEX: 30.31 KG/M2 | DIASTOLIC BLOOD PRESSURE: 73 MMHG | HEIGHT: 67.99 IN | TEMPERATURE: 97 F | RESPIRATION RATE: 16 BRPM | SYSTOLIC BLOOD PRESSURE: 133 MMHG

## 2019-11-06 DIAGNOSIS — I82.412 ACUTE DEEP VEIN THROMBOSIS (DVT) OF FEMORAL VEIN OF LEFT LOWER EXTREMITY (HCC): ICD-10-CM

## 2019-11-06 DIAGNOSIS — C25.9 PANCREATIC CARCINOMA (HCC): Primary | ICD-10-CM

## 2019-11-06 DIAGNOSIS — C78.00 MALIGNANT NEOPLASM METASTATIC TO LUNG, UNSPECIFIED LATERALITY (HCC): ICD-10-CM

## 2019-11-06 DIAGNOSIS — C78.7 LIVER METASTASES (HCC): ICD-10-CM

## 2019-11-06 DIAGNOSIS — R53.0 NEOPLASTIC (MALIGNANT) RELATED FATIGUE: ICD-10-CM

## 2019-11-06 DIAGNOSIS — I26.99 BILATERAL PULMONARY EMBOLISM (HCC): ICD-10-CM

## 2019-11-06 PROCEDURE — 96411 CHEMO IV PUSH ADDL DRUG: CPT

## 2019-11-06 PROCEDURE — 99214 OFFICE O/P EST MOD 30 MIN: CPT | Performed by: INTERNAL MEDICINE

## 2019-11-06 PROCEDURE — 85007 BL SMEAR W/DIFF WBC COUNT: CPT

## 2019-11-06 PROCEDURE — 96549 UNLISTED CHEMOTHERAPY PX: CPT

## 2019-11-06 PROCEDURE — 96413 CHEMO IV INFUSION 1 HR: CPT

## 2019-11-06 PROCEDURE — 96375 TX/PRO/DX INJ NEW DRUG ADDON: CPT

## 2019-11-06 PROCEDURE — 85025 COMPLETE CBC W/AUTO DIFF WBC: CPT

## 2019-11-06 PROCEDURE — 86301 IMMUNOASSAY TUMOR CA 19-9: CPT

## 2019-11-06 PROCEDURE — 85027 COMPLETE CBC AUTOMATED: CPT

## 2019-11-06 PROCEDURE — 96367 TX/PROPH/DG ADDL SEQ IV INF: CPT

## 2019-11-06 PROCEDURE — 80053 COMPREHEN METABOLIC PANEL: CPT

## 2019-11-06 RX ORDER — METOCLOPRAMIDE HYDROCHLORIDE 5 MG/ML
10 INJECTION INTRAMUSCULAR; INTRAVENOUS EVERY 6 HOURS PRN
Status: CANCELLED | OUTPATIENT
Start: 2019-11-06

## 2019-11-06 RX ORDER — ATROPINE SULFATE 0.4 MG/ML
0.2 AMPUL (ML) INJECTION ONCE
Status: COMPLETED | OUTPATIENT
Start: 2019-11-06 | End: 2019-11-06

## 2019-11-06 RX ORDER — FLUOROURACIL 50 MG/ML
400 INJECTION, SOLUTION INTRAVENOUS ONCE
Status: COMPLETED | OUTPATIENT
Start: 2019-11-06 | End: 2019-11-06

## 2019-11-06 RX ORDER — ONDANSETRON 2 MG/ML
8 INJECTION INTRAMUSCULAR; INTRAVENOUS EVERY 6 HOURS PRN
Status: CANCELLED | OUTPATIENT
Start: 2019-11-06

## 2019-11-06 RX ORDER — PROCHLORPERAZINE MALEATE 10 MG
10 TABLET ORAL EVERY 6 HOURS PRN
Status: CANCELLED | OUTPATIENT
Start: 2019-11-06

## 2019-11-06 RX ORDER — FLUOROURACIL 50 MG/ML
2400 INJECTION, SOLUTION INTRAVENOUS CONTINUOUS
Status: CANCELLED | OUTPATIENT
Start: 2019-11-06

## 2019-11-06 RX ORDER — FLUOROURACIL 50 MG/ML
400 INJECTION, SOLUTION INTRAVENOUS ONCE
Status: CANCELLED | OUTPATIENT
Start: 2019-11-06

## 2019-11-06 RX ORDER — FLUOROURACIL 50 MG/ML
2400 INJECTION, SOLUTION INTRAVENOUS CONTINUOUS
Status: DISCONTINUED | OUTPATIENT
Start: 2019-11-06 | End: 2019-11-06

## 2019-11-06 RX ORDER — LORAZEPAM 2 MG/ML
INJECTION INTRAMUSCULAR EVERY 4 HOURS PRN
Status: CANCELLED | OUTPATIENT
Start: 2019-11-06

## 2019-11-06 RX ORDER — LORAZEPAM 0.5 MG/1
TABLET ORAL EVERY 4 HOURS PRN
Status: CANCELLED | OUTPATIENT
Start: 2019-11-06

## 2019-11-06 RX ADMIN — FLUOROURACIL 4800 MG: 50 INJECTION, SOLUTION INTRAVENOUS at 12:47:00

## 2019-11-06 RX ADMIN — FLUOROURACIL 800 MG: 50 INJECTION, SOLUTION INTRAVENOUS at 12:39:00

## 2019-11-06 RX ADMIN — ATROPINE SULFATE 0.2 MG: 0.4 MG/ML AMPUL (ML) INJECTION at 10:51:00

## 2019-11-06 NOTE — PROGRESS NOTES
MD f/u for pancreatic ca. Due for C17 FOLFIRI today. Here with DIL. Reports that he is feeling good. Has intermittent fatigue and MINA. Will be leaving for [facility] next Tues and plan to be there x1 month. Eliquis samples given today.      Education Record

## 2019-11-08 ENCOUNTER — OFFICE VISIT (OUTPATIENT)
Dept: HEMATOLOGY/ONCOLOGY | Facility: HOSPITAL | Age: 69
End: 2019-11-08
Attending: INTERNAL MEDICINE
Payer: MEDICARE

## 2019-11-08 ENCOUNTER — HOSPITAL ENCOUNTER (OUTPATIENT)
Dept: CT IMAGING | Facility: HOSPITAL | Age: 69
Discharge: HOME | End: 2019-11-08
Attending: INTERNAL MEDICINE
Payer: MEDICARE

## 2019-11-08 DIAGNOSIS — C78.00 MALIGNANT NEOPLASM METASTATIC TO LUNG, UNSPECIFIED LATERALITY (HCC): ICD-10-CM

## 2019-11-08 DIAGNOSIS — C78.7 LIVER METASTASES (HCC): ICD-10-CM

## 2019-11-08 DIAGNOSIS — C25.9 PANCREATIC CARCINOMA (HCC): Primary | ICD-10-CM

## 2019-11-08 DIAGNOSIS — C25.9 PANCREATIC CARCINOMA (HCC): ICD-10-CM

## 2019-11-08 DIAGNOSIS — R05.9 COUGH: ICD-10-CM

## 2019-11-08 PROCEDURE — 74177 CT ABD & PELVIS W/CONTRAST: CPT | Performed by: INTERNAL MEDICINE

## 2019-11-08 PROCEDURE — 96372 THER/PROPH/DIAG INJ SC/IM: CPT

## 2019-11-08 PROCEDURE — 71260 CT THORAX DX C+: CPT | Performed by: INTERNAL MEDICINE

## 2019-11-12 ENCOUNTER — TELEPHONE (OUTPATIENT)
Dept: HEMATOLOGY/ONCOLOGY | Facility: HOSPITAL | Age: 69
End: 2019-11-12

## 2019-11-12 NOTE — TELEPHONE ENCOUNTER
----- Message from Jelani Colbert MD sent at 11/12/2019  8:15 AM CST -----  Called on call MD with fever last night and ER macy. Can you contact one of the daughters to see what happened?  He should contact PCP or go to urgent care

## 2019-11-12 NOTE — TELEPHONE ENCOUNTER
Spoke with daughter, Simi Tristan. States yesterday pt was very fatigued and was c/o of chills. Highest temp was 102. 3. Pt refused to go to ED after talking to MD on call. States this morning, pt is feeling better and no longer febrile.  Denies any sore throat, c

## 2019-11-16 ENCOUNTER — PATIENT MESSAGE (OUTPATIENT)
Dept: HEMATOLOGY/ONCOLOGY | Facility: HOSPITAL | Age: 69
End: 2019-11-16

## 2019-11-18 NOTE — TELEPHONE ENCOUNTER
From: Carrie Aguilar  To: Sav Villeda MD  Sent: 11/16/2019 10:23 AM CST  Subject: Other    Hi     Just an update that my dad did end up going to [facility] on November 12th.  He did not feel it necessary to go get checked out at immediate care since he

## 2019-12-17 NOTE — PROGRESS NOTES
[facility] Progress Note      Patient Name:  Ralf Austin  YOB: 1950  Medical Record Number:  [id number]    Date of visit: 12/18/2019    CHIEF COMPLAINT: Metastatic pancreatic carcinoma, daphne PE, L leg DVT.     HPI:     71year old BEDTIME 90 tablet 3   • pancrelipase, Lip-Prot-Amyl, 5000-16789 units Oral Cap DR Particles Take 1 capsule (5,000 Units total) by mouth 3 (three) times daily with meals. 90 capsule 3   • Cholecalciferol (VITAMIN D3 OR) Take by mouth.      • Cyanocobalamin ( 17.5 g/dL    HCT 40.5 39.0 - 53.0 %    .0 150.0 - 450.0 10(3)uL    .5 (H) 80.0 - 100.0 fL    MCH 32.4 26.0 - 34.0 pg    MCHC 31.6 31.0 - 37.0 g/dL    RDW 14.7 11.0 - 15.0 %    RDW-SD 56.0 (H) 35.1 - 46.3 fL    Neutrophil Absolute Prelim 4.13 opportunity to ask questions. Images reviewed with pt and daughter. ORDERS PLACED:    Return in about 2 weeks (around 1/1/2020) for Labs, APN eval, Chemo. Pump disconnect, inj 2 days. Zainab Givens M.D.     [facility]    [place]

## 2019-12-18 NOTE — PROGRESS NOTES
Education Record    Learner:  Patient    Disease / Diagnosis:  Pancreatic cancer    Barriers / Limitations:  None   Comments:    Method:  Brief focused   Comments:    General Topics:  Medication, Procedure and Plan of care reviewed   Comments:    Outcome: External genitalia is normal.

## 2019-12-18 NOTE — PROGRESS NOTES
MD f/u for pancreatic ca. Due for C18 today. Recently back from a trip to [facility]. Has gained 9lbs since last OV, stating his appetite has been big. Denies any N/V/C/D. Has ongoing PN to feet and intermittent calf numbness. Denies any pain.  Has occasional D

## 2020-01-01 ENCOUNTER — TELEPHONE (OUTPATIENT)
Dept: HEMATOLOGY/ONCOLOGY | Facility: HOSPITAL | Age: 70
End: 2020-01-01

## 2020-01-01 ENCOUNTER — SOCIAL WORK SERVICES (OUTPATIENT)
Dept: HEMATOLOGY/ONCOLOGY | Facility: HOSPITAL | Age: 70
End: 2020-01-01

## 2020-01-01 ENCOUNTER — PATIENT MESSAGE (OUTPATIENT)
Dept: HEMATOLOGY/ONCOLOGY | Facility: HOSPITAL | Age: 70
End: 2020-01-01

## 2020-01-01 ENCOUNTER — TELEPHONE (OUTPATIENT)
Dept: FAMILY MEDICINE CLINIC | Facility: CLINIC | Age: 70
End: 2020-01-01

## 2020-01-01 ENCOUNTER — OFFICE VISIT (OUTPATIENT)
Dept: HEMATOLOGY/ONCOLOGY | Facility: HOSPITAL | Age: 70
End: 2020-01-01
Attending: INTERNAL MEDICINE
Payer: MEDICARE

## 2020-01-01 ENCOUNTER — HOSPITAL ENCOUNTER (OUTPATIENT)
Dept: CT IMAGING | Facility: HOSPITAL | Age: 70
Discharge: HOME | End: 2020-01-01
Attending: INTERNAL MEDICINE
Payer: MEDICARE

## 2020-01-01 ENCOUNTER — TELEPHONE (OUTPATIENT)
Dept: HEMATOLOGY/ONCOLOGY | Age: 70
End: 2020-01-01

## 2020-01-01 ENCOUNTER — OFFICE VISIT (OUTPATIENT)
Dept: HEMATOLOGY/ONCOLOGY | Facility: HOSPITAL | Age: 70
End: 2020-01-01
Attending: FAMILY MEDICINE
Payer: MEDICARE

## 2020-01-01 ENCOUNTER — DIETICIAN VISIT (OUTPATIENT)
Dept: HEMATOLOGY/ONCOLOGY | Facility: HOSPITAL | Age: 70
End: 2020-01-01

## 2020-01-01 ENCOUNTER — TELEMEDICINE (OUTPATIENT)
Dept: FAMILY MEDICINE CLINIC | Facility: CLINIC | Age: 70
End: 2020-01-01

## 2020-01-01 ENCOUNTER — PATIENT MESSAGE (OUTPATIENT)
Dept: FAMILY MEDICINE CLINIC | Facility: CLINIC | Age: 70
End: 2020-01-01

## 2020-01-01 ENCOUNTER — PATIENT OUTREACH (OUTPATIENT)
Dept: FAMILY MEDICINE CLINIC | Facility: CLINIC | Age: 70
End: 2020-01-01

## 2020-01-01 VITALS
SYSTOLIC BLOOD PRESSURE: 150 MMHG | BODY MASS INDEX: 31.67 KG/M2 | DIASTOLIC BLOOD PRESSURE: 91 MMHG | RESPIRATION RATE: 16 BRPM | TEMPERATURE: 96 F | HEIGHT: 67.99 IN | HEART RATE: 67 BPM | WEIGHT: 209 LBS | OXYGEN SATURATION: 97 %

## 2020-01-01 VITALS
DIASTOLIC BLOOD PRESSURE: 69 MMHG | OXYGEN SATURATION: 98 % | TEMPERATURE: 97 F | HEART RATE: 85 BPM | HEIGHT: 67.99 IN | BODY MASS INDEX: 30.28 KG/M2 | TEMPERATURE: 97 F | WEIGHT: 199.81 LBS | RESPIRATION RATE: 18 BRPM | SYSTOLIC BLOOD PRESSURE: 137 MMHG

## 2020-01-01 VITALS
WEIGHT: 203.81 LBS | DIASTOLIC BLOOD PRESSURE: 84 MMHG | HEIGHT: 67.99 IN | HEART RATE: 72 BPM | BODY MASS INDEX: 30.89 KG/M2 | TEMPERATURE: 97 F | SYSTOLIC BLOOD PRESSURE: 130 MMHG | OXYGEN SATURATION: 96 % | RESPIRATION RATE: 16 BRPM

## 2020-01-01 VITALS
SYSTOLIC BLOOD PRESSURE: 128 MMHG | RESPIRATION RATE: 18 BRPM | TEMPERATURE: 98 F | HEIGHT: 67.99 IN | HEART RATE: 69 BPM | WEIGHT: 199 LBS | BODY MASS INDEX: 30.16 KG/M2 | DIASTOLIC BLOOD PRESSURE: 85 MMHG | OXYGEN SATURATION: 96 %

## 2020-01-01 VITALS
RESPIRATION RATE: 18 BRPM | HEART RATE: 66 BPM | DIASTOLIC BLOOD PRESSURE: 79 MMHG | BODY MASS INDEX: 31 KG/M2 | TEMPERATURE: 98 F | SYSTOLIC BLOOD PRESSURE: 140 MMHG | WEIGHT: 203.63 LBS | OXYGEN SATURATION: 95 %

## 2020-01-01 VITALS
SYSTOLIC BLOOD PRESSURE: 141 MMHG | HEART RATE: 75 BPM | HEIGHT: 67.99 IN | RESPIRATION RATE: 18 BRPM | TEMPERATURE: 99 F | BODY MASS INDEX: 30.52 KG/M2 | WEIGHT: 201.38 LBS | DIASTOLIC BLOOD PRESSURE: 78 MMHG | OXYGEN SATURATION: 95 %

## 2020-01-01 VITALS
TEMPERATURE: 97 F | HEART RATE: 62 BPM | BODY MASS INDEX: 30.71 KG/M2 | OXYGEN SATURATION: 97 % | WEIGHT: 202.63 LBS | RESPIRATION RATE: 16 BRPM | HEIGHT: 67.99 IN | SYSTOLIC BLOOD PRESSURE: 143 MMHG | DIASTOLIC BLOOD PRESSURE: 78 MMHG

## 2020-01-01 VITALS
SYSTOLIC BLOOD PRESSURE: 119 MMHG | DIASTOLIC BLOOD PRESSURE: 74 MMHG | HEART RATE: 66 BPM | TEMPERATURE: 98 F | OXYGEN SATURATION: 95 % | HEIGHT: 67.99 IN | BODY MASS INDEX: 30.56 KG/M2 | WEIGHT: 201.63 LBS | RESPIRATION RATE: 16 BRPM

## 2020-01-01 VITALS
SYSTOLIC BLOOD PRESSURE: 146 MMHG | RESPIRATION RATE: 18 BRPM | TEMPERATURE: 97 F | WEIGHT: 204.81 LBS | HEIGHT: 67.99 IN | HEART RATE: 66 BPM | BODY MASS INDEX: 31.04 KG/M2 | DIASTOLIC BLOOD PRESSURE: 82 MMHG | OXYGEN SATURATION: 96 %

## 2020-01-01 VITALS
RESPIRATION RATE: 16 BRPM | DIASTOLIC BLOOD PRESSURE: 90 MMHG | WEIGHT: 197.81 LBS | BODY MASS INDEX: 29.98 KG/M2 | HEART RATE: 77 BPM | HEIGHT: 67.99 IN | SYSTOLIC BLOOD PRESSURE: 143 MMHG | TEMPERATURE: 97 F | OXYGEN SATURATION: 99 %

## 2020-01-01 VITALS
HEIGHT: 67.99 IN | HEART RATE: 76 BPM | RESPIRATION RATE: 18 BRPM | BODY MASS INDEX: 30.67 KG/M2 | WEIGHT: 202.38 LBS | DIASTOLIC BLOOD PRESSURE: 78 MMHG | SYSTOLIC BLOOD PRESSURE: 155 MMHG | OXYGEN SATURATION: 98 % | TEMPERATURE: 96 F

## 2020-01-01 VITALS
HEART RATE: 72 BPM | RESPIRATION RATE: 18 BRPM | TEMPERATURE: 99 F | BODY MASS INDEX: 31.4 KG/M2 | HEIGHT: 67.99 IN | WEIGHT: 207.19 LBS | OXYGEN SATURATION: 98 % | SYSTOLIC BLOOD PRESSURE: 145 MMHG | DIASTOLIC BLOOD PRESSURE: 83 MMHG

## 2020-01-01 VITALS
WEIGHT: 202.63 LBS | HEIGHT: 67.99 IN | TEMPERATURE: 97 F | DIASTOLIC BLOOD PRESSURE: 78 MMHG | HEART RATE: 68 BPM | BODY MASS INDEX: 30.71 KG/M2 | OXYGEN SATURATION: 96 % | SYSTOLIC BLOOD PRESSURE: 126 MMHG | RESPIRATION RATE: 18 BRPM

## 2020-01-01 VITALS
SYSTOLIC BLOOD PRESSURE: 135 MMHG | HEART RATE: 75 BPM | WEIGHT: 199.19 LBS | DIASTOLIC BLOOD PRESSURE: 75 MMHG | TEMPERATURE: 99 F | HEIGHT: 67.99 IN | BODY MASS INDEX: 30.19 KG/M2 | OXYGEN SATURATION: 99 % | RESPIRATION RATE: 16 BRPM

## 2020-01-01 VITALS
OXYGEN SATURATION: 96 % | SYSTOLIC BLOOD PRESSURE: 136 MMHG | BODY MASS INDEX: 31.07 KG/M2 | WEIGHT: 205 LBS | DIASTOLIC BLOOD PRESSURE: 81 MMHG | HEIGHT: 67.99 IN | TEMPERATURE: 98 F | HEART RATE: 70 BPM | RESPIRATION RATE: 18 BRPM

## 2020-01-01 VITALS
BODY MASS INDEX: 31 KG/M2 | RESPIRATION RATE: 16 BRPM | WEIGHT: 203.38 LBS | HEART RATE: 75 BPM | TEMPERATURE: 97 F | SYSTOLIC BLOOD PRESSURE: 143 MMHG | DIASTOLIC BLOOD PRESSURE: 83 MMHG | OXYGEN SATURATION: 99 %

## 2020-01-01 VITALS
DIASTOLIC BLOOD PRESSURE: 79 MMHG | BODY MASS INDEX: 30.98 KG/M2 | HEART RATE: 71 BPM | WEIGHT: 204.38 LBS | RESPIRATION RATE: 18 BRPM | HEIGHT: 67.99 IN | TEMPERATURE: 98 F | OXYGEN SATURATION: 96 % | SYSTOLIC BLOOD PRESSURE: 145 MMHG

## 2020-01-01 VITALS
DIASTOLIC BLOOD PRESSURE: 91 MMHG | SYSTOLIC BLOOD PRESSURE: 166 MMHG | OXYGEN SATURATION: 96 % | WEIGHT: 205.19 LBS | HEIGHT: 67.99 IN | RESPIRATION RATE: 18 BRPM | BODY MASS INDEX: 31.1 KG/M2 | TEMPERATURE: 99 F | HEART RATE: 87 BPM

## 2020-01-01 VITALS
OXYGEN SATURATION: 96 % | RESPIRATION RATE: 18 BRPM | BODY MASS INDEX: 31.1 KG/M2 | SYSTOLIC BLOOD PRESSURE: 164 MMHG | TEMPERATURE: 98 F | DIASTOLIC BLOOD PRESSURE: 89 MMHG | WEIGHT: 205.19 LBS | HEIGHT: 67.99 IN | HEART RATE: 92 BPM

## 2020-01-01 VITALS
HEART RATE: 69 BPM | RESPIRATION RATE: 16 BRPM | OXYGEN SATURATION: 97 % | SYSTOLIC BLOOD PRESSURE: 133 MMHG | HEIGHT: 67.99 IN | TEMPERATURE: 97 F | DIASTOLIC BLOOD PRESSURE: 81 MMHG | BODY MASS INDEX: 31.07 KG/M2 | WEIGHT: 205 LBS

## 2020-01-01 VITALS
HEIGHT: 67.99 IN | OXYGEN SATURATION: 98 % | BODY MASS INDEX: 30.37 KG/M2 | RESPIRATION RATE: 16 BRPM | WEIGHT: 200.38 LBS | DIASTOLIC BLOOD PRESSURE: 77 MMHG | TEMPERATURE: 97 F | HEART RATE: 72 BPM | SYSTOLIC BLOOD PRESSURE: 125 MMHG

## 2020-01-01 VITALS
TEMPERATURE: 97 F | HEART RATE: 79 BPM | DIASTOLIC BLOOD PRESSURE: 79 MMHG | WEIGHT: 200 LBS | BODY MASS INDEX: 30.31 KG/M2 | SYSTOLIC BLOOD PRESSURE: 139 MMHG | HEIGHT: 67.99 IN | OXYGEN SATURATION: 97 % | RESPIRATION RATE: 18 BRPM

## 2020-01-01 DIAGNOSIS — C78.7 LIVER METASTASES (HCC): ICD-10-CM

## 2020-01-01 DIAGNOSIS — T45.1X5A CHEMOTHERAPY-INDUCED NAUSEA: ICD-10-CM

## 2020-01-01 DIAGNOSIS — C25.9 PANCREATIC CARCINOMA (HCC): ICD-10-CM

## 2020-01-01 DIAGNOSIS — D69.6 THROMBOCYTOPENIA (HCC): ICD-10-CM

## 2020-01-01 DIAGNOSIS — Z86.718 HISTORY OF DVT (DEEP VEIN THROMBOSIS): ICD-10-CM

## 2020-01-01 DIAGNOSIS — C78.00 MALIGNANT NEOPLASM METASTATIC TO LUNG, UNSPECIFIED LATERALITY (HCC): ICD-10-CM

## 2020-01-01 DIAGNOSIS — R63.0 ANOREXIA: ICD-10-CM

## 2020-01-01 DIAGNOSIS — R10.13 EPIGASTRIC PAIN: ICD-10-CM

## 2020-01-01 DIAGNOSIS — T45.1X5D CHEMOTHERAPY ADVERSE REACTION, SUBSEQUENT ENCOUNTER: ICD-10-CM

## 2020-01-01 DIAGNOSIS — C25.9 PANCREATIC CARCINOMA (HCC): Primary | ICD-10-CM

## 2020-01-01 DIAGNOSIS — R53.83 FATIGUE DUE TO TREATMENT: ICD-10-CM

## 2020-01-01 DIAGNOSIS — R53.0 NEOPLASTIC (MALIGNANT) RELATED FATIGUE: ICD-10-CM

## 2020-01-01 DIAGNOSIS — I26.99 BILATERAL PULMONARY EMBOLISM (HCC): ICD-10-CM

## 2020-01-01 DIAGNOSIS — C78.00 MALIGNANT NEOPLASM METASTATIC TO LUNG, UNSPECIFIED LATERALITY (HCC): Primary | ICD-10-CM

## 2020-01-01 DIAGNOSIS — I82.412 ACUTE DEEP VEIN THROMBOSIS (DVT) OF FEMORAL VEIN OF LEFT LOWER EXTREMITY (HCC): ICD-10-CM

## 2020-01-01 DIAGNOSIS — R11.0 CHEMOTHERAPY-INDUCED NAUSEA: ICD-10-CM

## 2020-01-01 DIAGNOSIS — T45.1X5D ADVERSE EFFECT OF CHEMOTHERAPY, SUBSEQUENT ENCOUNTER: ICD-10-CM

## 2020-01-01 DIAGNOSIS — R10.11 RUQ PAIN: ICD-10-CM

## 2020-01-01 DIAGNOSIS — R06.2 WHEEZING: ICD-10-CM

## 2020-01-01 DIAGNOSIS — G89.3 NEOPLASM RELATED PAIN: ICD-10-CM

## 2020-01-01 DIAGNOSIS — Z79.01 CHRONIC ANTICOAGULATION: ICD-10-CM

## 2020-01-01 DIAGNOSIS — R19.7 DIARRHEA, UNSPECIFIED TYPE: ICD-10-CM

## 2020-01-01 DIAGNOSIS — R73.09 ELEVATED HEMOGLOBIN A1C: Primary | ICD-10-CM

## 2020-01-01 DIAGNOSIS — Z71.89 GOALS OF CARE, COUNSELING/DISCUSSION: ICD-10-CM

## 2020-01-01 DIAGNOSIS — E87.6 HYPOKALEMIA: ICD-10-CM

## 2020-01-01 DIAGNOSIS — R53.81 PHYSICAL DECONDITIONING: ICD-10-CM

## 2020-01-01 DIAGNOSIS — C25.9 PANCREATIC CANCER METASTASIZED TO LIVER (HCC): Primary | ICD-10-CM

## 2020-01-01 DIAGNOSIS — R06.00 DYSPNEA ON EXERTION: ICD-10-CM

## 2020-01-01 DIAGNOSIS — T45.1X5A CHEMOTHERAPY-INDUCED NEUROPATHY (HCC): ICD-10-CM

## 2020-01-01 DIAGNOSIS — D64.9 ANEMIA, UNSPECIFIED TYPE: ICD-10-CM

## 2020-01-01 DIAGNOSIS — G47.00 INSOMNIA, UNSPECIFIED TYPE: ICD-10-CM

## 2020-01-01 DIAGNOSIS — R73.9 HYPERGLYCEMIA: ICD-10-CM

## 2020-01-01 DIAGNOSIS — G62.0 CHEMOTHERAPY-INDUCED NEUROPATHY (HCC): ICD-10-CM

## 2020-01-01 DIAGNOSIS — R05.9 COUGH: ICD-10-CM

## 2020-01-01 DIAGNOSIS — C78.7 PANCREATIC CANCER METASTASIZED TO LIVER (HCC): Primary | ICD-10-CM

## 2020-01-01 LAB
ALBUMIN SERPL-MCNC: 3.2 G/DL (ref 3.4–5)
ALBUMIN SERPL-MCNC: 3.5 G/DL (ref 3.4–5)
ALBUMIN SERPL-MCNC: 3.6 G/DL (ref 3.4–5)
ALBUMIN SERPL-MCNC: 3.6 G/DL (ref 3.4–5)
ALBUMIN SERPL-MCNC: 3.7 G/DL (ref 3.4–5)
ALBUMIN SERPL-MCNC: 3.7 G/DL (ref 3.4–5)
ALBUMIN SERPL-MCNC: 3.8 G/DL (ref 3.4–5)
ALBUMIN SERPL-MCNC: 3.8 G/DL (ref 3.4–5)
ALBUMIN SERPL-MCNC: 3.9 G/DL (ref 3.4–5)
ALBUMIN SERPL-MCNC: 3.9 G/DL (ref 3.4–5)
ALBUMIN/GLOB SERPL: 0.7 {RATIO} (ref 1–2)
ALBUMIN/GLOB SERPL: 0.9 {RATIO} (ref 1–2)
ALBUMIN/GLOB SERPL: 1 {RATIO} (ref 1–2)
ALBUMIN/GLOB SERPL: 1.1 {RATIO} (ref 1–2)
ALBUMIN/GLOB SERPL: 1.1 {RATIO} (ref 1–2)
ALBUMIN/GLOB SERPL: 1.2 {RATIO} (ref 1–2)
ALP LIVER SERPL-CCNC: 173 U/L (ref 45–117)
ALP LIVER SERPL-CCNC: 174 U/L (ref 45–117)
ALP LIVER SERPL-CCNC: 184 U/L (ref 45–117)
ALP LIVER SERPL-CCNC: 192 U/L (ref 45–117)
ALP LIVER SERPL-CCNC: 203 U/L (ref 45–117)
ALP LIVER SERPL-CCNC: 203 U/L (ref 45–117)
ALP LIVER SERPL-CCNC: 213 U/L (ref 45–117)
ALP LIVER SERPL-CCNC: 249 U/L (ref 45–117)
ALP LIVER SERPL-CCNC: 259 U/L (ref 45–117)
ALP LIVER SERPL-CCNC: 267 U/L (ref 45–117)
ALP LIVER SERPL-CCNC: 268 U/L (ref 45–117)
ALP LIVER SERPL-CCNC: 296 U/L (ref 45–117)
ALT SERPL-CCNC: 34 U/L (ref 16–61)
ALT SERPL-CCNC: 40 U/L (ref 16–61)
ALT SERPL-CCNC: 41 U/L (ref 16–61)
ALT SERPL-CCNC: 42 U/L (ref 16–61)
ALT SERPL-CCNC: 46 U/L (ref 16–61)
ALT SERPL-CCNC: 48 U/L (ref 16–61)
ALT SERPL-CCNC: 59 U/L (ref 16–61)
ALT SERPL-CCNC: 59 U/L (ref 16–61)
ALT SERPL-CCNC: 68 U/L (ref 16–61)
ALT SERPL-CCNC: 69 U/L (ref 16–61)
ANION GAP SERPL CALC-SCNC: 0 MMOL/L (ref 0–18)
ANION GAP SERPL CALC-SCNC: 4 MMOL/L (ref 0–18)
ANION GAP SERPL CALC-SCNC: 5 MMOL/L (ref 0–18)
ANION GAP SERPL CALC-SCNC: 6 MMOL/L (ref 0–18)
ANION GAP SERPL CALC-SCNC: 7 MMOL/L (ref 0–18)
ANION GAP SERPL CALC-SCNC: 7 MMOL/L (ref 0–18)
ANION GAP SERPL CALC-SCNC: 8 MMOL/L (ref 0–18)
AST SERPL-CCNC: 24 U/L (ref 15–37)
AST SERPL-CCNC: 26 U/L (ref 15–37)
AST SERPL-CCNC: 26 U/L (ref 15–37)
AST SERPL-CCNC: 27 U/L (ref 15–37)
AST SERPL-CCNC: 28 U/L (ref 15–37)
AST SERPL-CCNC: 29 U/L (ref 15–37)
AST SERPL-CCNC: 31 U/L (ref 15–37)
AST SERPL-CCNC: 32 U/L (ref 15–37)
AST SERPL-CCNC: 34 U/L (ref 15–37)
AST SERPL-CCNC: 35 U/L (ref 15–37)
AST SERPL-CCNC: 37 U/L (ref 15–37)
AST SERPL-CCNC: 41 U/L (ref 15–37)
BASOPHILS # BLD AUTO: 0.02 X10(3) UL (ref 0–0.2)
BASOPHILS # BLD AUTO: 0.04 X10(3) UL (ref 0–0.2)
BASOPHILS # BLD AUTO: 0.06 X10(3) UL (ref 0–0.2)
BASOPHILS # BLD AUTO: 0.09 X10(3) UL (ref 0–0.2)
BASOPHILS # BLD: 0 X10(3) UL (ref 0–0.2)
BASOPHILS # BLD: 0 X10(3) UL (ref 0–0.2)
BASOPHILS # BLD: 0.24 X10(3) UL (ref 0–0.2)
BASOPHILS NFR BLD AUTO: 0.4 %
BASOPHILS NFR BLD AUTO: 0.4 %
BASOPHILS NFR BLD AUTO: 0.5 %
BASOPHILS NFR BLD AUTO: 0.6 %
BASOPHILS NFR BLD AUTO: 0.7 %
BASOPHILS NFR BLD AUTO: 0.7 %
BASOPHILS NFR BLD AUTO: 0.9 %
BASOPHILS NFR BLD: 0 %
BASOPHILS NFR BLD: 0 %
BASOPHILS NFR BLD: 1 %
BILIRUB SERPL-MCNC: 0.3 MG/DL (ref 0.1–2)
BILIRUB SERPL-MCNC: 0.4 MG/DL (ref 0.1–2)
BILIRUB SERPL-MCNC: 0.5 MG/DL (ref 0.1–2)
BILIRUB SERPL-MCNC: 0.5 MG/DL (ref 0.1–2)
BUN BLD-MCNC: 10 MG/DL (ref 7–18)
BUN BLD-MCNC: 11 MG/DL (ref 7–18)
BUN BLD-MCNC: 12 MG/DL (ref 7–18)
BUN BLD-MCNC: 14 MG/DL (ref 7–18)
BUN BLD-MCNC: 15 MG/DL (ref 7–18)
BUN BLD-MCNC: 8 MG/DL (ref 7–18)
BUN/CREAT SERPL: 10 (ref 10–20)
BUN/CREAT SERPL: 10.3 (ref 10–20)
BUN/CREAT SERPL: 11.6 (ref 10–20)
BUN/CREAT SERPL: 11.6 (ref 10–20)
BUN/CREAT SERPL: 12.5 (ref 10–20)
BUN/CREAT SERPL: 12.8 (ref 10–20)
BUN/CREAT SERPL: 15.1 (ref 10–20)
BUN/CREAT SERPL: 18.1 (ref 10–20)
BUN/CREAT SERPL: 9 (ref 10–20)
BUN/CREAT SERPL: 9.7 (ref 10–20)
CALCIUM BLD-MCNC: 8.5 MG/DL (ref 8.5–10.1)
CALCIUM BLD-MCNC: 8.7 MG/DL (ref 8.5–10.1)
CALCIUM BLD-MCNC: 9 MG/DL (ref 8.5–10.1)
CALCIUM BLD-MCNC: 9.1 MG/DL (ref 8.5–10.1)
CALCIUM BLD-MCNC: 9.1 MG/DL (ref 8.5–10.1)
CALCIUM BLD-MCNC: 9.4 MG/DL (ref 8.5–10.1)
CANCER AG19-9 SERPL-ACNC: 3892.1 U/ML (ref ?–37)
CANCER AG19-9 SERPL-ACNC: 3960 U/ML (ref ?–37)
CANCER AG19-9 SERPL-ACNC: 4113 U/ML (ref ?–37)
CANCER AG19-9 SERPL-ACNC: 4129.2 U/ML (ref ?–37)
CANCER AG19-9 SERPL-ACNC: 4222.1 U/ML (ref ?–37)
CANCER AG19-9 SERPL-ACNC: 4233.2 U/ML (ref ?–37)
CANCER AG19-9 SERPL-ACNC: 4617.6 U/ML (ref ?–37)
CANCER AG19-9 SERPL-ACNC: 4878.6 U/ML (ref ?–37)
CANCER AG19-9 SERPL-ACNC: 4981.4 U/ML (ref ?–37)
CANCER AG19-9 SERPL-ACNC: 5294.7 U/ML (ref ?–37)
CANCER AG19-9 SERPL-ACNC: 5600.1 U/ML (ref ?–37)
CANCER AG19-9 SERPL-ACNC: 6816.8 U/ML (ref ?–37)
CHLORIDE SERPL-SCNC: 106 MMOL/L (ref 98–112)
CHLORIDE SERPL-SCNC: 107 MMOL/L (ref 98–112)
CHLORIDE SERPL-SCNC: 108 MMOL/L (ref 98–112)
CHLORIDE SERPL-SCNC: 109 MMOL/L (ref 98–112)
CHLORIDE SERPL-SCNC: 109 MMOL/L (ref 98–112)
CO2 SERPL-SCNC: 26 MMOL/L (ref 21–32)
CO2 SERPL-SCNC: 27 MMOL/L (ref 21–32)
CO2 SERPL-SCNC: 28 MMOL/L (ref 21–32)
CO2 SERPL-SCNC: 30 MMOL/L (ref 21–32)
CREAT BLD-MCNC: 0.83 MG/DL (ref 0.7–1.3)
CREAT BLD-MCNC: 0.86 MG/DL (ref 0.7–1.3)
CREAT BLD-MCNC: 0.89 MG/DL (ref 0.7–1.3)
CREAT BLD-MCNC: 0.93 MG/DL (ref 0.7–1.3)
CREAT BLD-MCNC: 0.94 MG/DL (ref 0.7–1.3)
CREAT BLD-MCNC: 0.94 MG/DL (ref 0.7–1.3)
CREAT BLD-MCNC: 0.95 MG/DL (ref 0.7–1.3)
CREAT BLD-MCNC: 0.95 MG/DL (ref 0.7–1.3)
CREAT BLD-MCNC: 0.96 MG/DL (ref 0.7–1.3)
CREAT BLD-MCNC: 0.97 MG/DL (ref 0.7–1.3)
CREAT BLD-MCNC: 1 MG/DL (ref 0.7–1.3)
CREAT BLD-MCNC: 1.03 MG/DL (ref 0.7–1.3)
DEPRECATED RDW RBC AUTO: 52.4 FL (ref 35.1–46.3)
DEPRECATED RDW RBC AUTO: 52.6 FL (ref 35.1–46.3)
DEPRECATED RDW RBC AUTO: 53.6 FL (ref 35.1–46.3)
DEPRECATED RDW RBC AUTO: 53.7 FL (ref 35.1–46.3)
DEPRECATED RDW RBC AUTO: 54.1 FL (ref 35.1–46.3)
DEPRECATED RDW RBC AUTO: 54.4 FL (ref 35.1–46.3)
DEPRECATED RDW RBC AUTO: 54.5 FL (ref 35.1–46.3)
DEPRECATED RDW RBC AUTO: 55.4 FL (ref 35.1–46.3)
DEPRECATED RDW RBC AUTO: 55.5 FL (ref 35.1–46.3)
DEPRECATED RDW RBC AUTO: 56.3 FL (ref 35.1–46.3)
EOSINOPHIL # BLD AUTO: 0.17 X10(3) UL (ref 0–0.7)
EOSINOPHIL # BLD AUTO: 0.19 X10(3) UL (ref 0–0.7)
EOSINOPHIL # BLD AUTO: 0.29 X10(3) UL (ref 0–0.7)
EOSINOPHIL # BLD AUTO: 0.36 X10(3) UL (ref 0–0.7)
EOSINOPHIL # BLD AUTO: 0.37 X10(3) UL (ref 0–0.7)
EOSINOPHIL # BLD AUTO: 0.38 X10(3) UL (ref 0–0.7)
EOSINOPHIL # BLD AUTO: 0.39 X10(3) UL (ref 0–0.7)
EOSINOPHIL # BLD AUTO: 0.54 X10(3) UL (ref 0–0.7)
EOSINOPHIL # BLD AUTO: 0.68 X10(3) UL (ref 0–0.7)
EOSINOPHIL # BLD: 0 X10(3) UL (ref 0–0.7)
EOSINOPHIL # BLD: 0.48 X10(3) UL (ref 0–0.7)
EOSINOPHIL # BLD: 0.52 X10(3) UL (ref 0–0.7)
EOSINOPHIL NFR BLD AUTO: 1.4 %
EOSINOPHIL NFR BLD AUTO: 3.1 %
EOSINOPHIL NFR BLD AUTO: 4.6 %
EOSINOPHIL NFR BLD AUTO: 4.7 %
EOSINOPHIL NFR BLD AUTO: 5 %
EOSINOPHIL NFR BLD AUTO: 5.9 %
EOSINOPHIL NFR BLD AUTO: 6.4 %
EOSINOPHIL NFR BLD: 0 %
EOSINOPHIL NFR BLD: 2 %
EOSINOPHIL NFR BLD: 4 %
ERYTHROCYTE [DISTWIDTH] IN BLOOD BY AUTOMATED COUNT: 14.3 % (ref 11–15)
ERYTHROCYTE [DISTWIDTH] IN BLOOD BY AUTOMATED COUNT: 14.3 % (ref 11–15)
ERYTHROCYTE [DISTWIDTH] IN BLOOD BY AUTOMATED COUNT: 14.6 % (ref 11–15)
ERYTHROCYTE [DISTWIDTH] IN BLOOD BY AUTOMATED COUNT: 14.7 % (ref 11–15)
ERYTHROCYTE [DISTWIDTH] IN BLOOD BY AUTOMATED COUNT: 14.7 % (ref 11–15)
ERYTHROCYTE [DISTWIDTH] IN BLOOD BY AUTOMATED COUNT: 14.8 % (ref 11–15)
ERYTHROCYTE [DISTWIDTH] IN BLOOD BY AUTOMATED COUNT: 15.1 % (ref 11–15)
ERYTHROCYTE [DISTWIDTH] IN BLOOD BY AUTOMATED COUNT: 15.2 % (ref 11–15)
ERYTHROCYTE [DISTWIDTH] IN BLOOD BY AUTOMATED COUNT: 15.3 % (ref 11–15)
ERYTHROCYTE [DISTWIDTH] IN BLOOD BY AUTOMATED COUNT: 15.6 % (ref 11–15)
ERYTHROCYTE [DISTWIDTH] IN BLOOD BY AUTOMATED COUNT: 15.8 % (ref 11–15)
ERYTHROCYTE [DISTWIDTH] IN BLOOD BY AUTOMATED COUNT: 16.3 % (ref 11–15)
EST. AVERAGE GLUCOSE BLD GHB EST-MCNC: 177 MG/DL (ref 68–126)
GLOBULIN PLAS-MCNC: 3.3 G/DL (ref 2.8–4.4)
GLOBULIN PLAS-MCNC: 3.4 G/DL (ref 2.8–4.4)
GLOBULIN PLAS-MCNC: 3.4 G/DL (ref 2.8–4.4)
GLOBULIN PLAS-MCNC: 3.5 G/DL (ref 2.8–4.4)
GLOBULIN PLAS-MCNC: 3.6 G/DL (ref 2.8–4.4)
GLOBULIN PLAS-MCNC: 3.7 G/DL (ref 2.8–4.4)
GLOBULIN PLAS-MCNC: 3.7 G/DL (ref 2.8–4.4)
GLOBULIN PLAS-MCNC: 3.8 G/DL (ref 2.8–4.4)
GLOBULIN PLAS-MCNC: 3.8 G/DL (ref 2.8–4.4)
GLOBULIN PLAS-MCNC: 4.1 G/DL (ref 2.8–4.4)
GLOBULIN PLAS-MCNC: 4.1 G/DL (ref 2.8–4.4)
GLOBULIN PLAS-MCNC: 4.9 G/DL (ref 2.8–4.4)
GLUCOSE BLD-MCNC: 154 MG/DL (ref 70–99)
GLUCOSE BLD-MCNC: 155 MG/DL (ref 70–99)
GLUCOSE BLD-MCNC: 168 MG/DL (ref 70–99)
GLUCOSE BLD-MCNC: 170 MG/DL (ref 70–99)
GLUCOSE BLD-MCNC: 176 MG/DL (ref 70–99)
GLUCOSE BLD-MCNC: 185 MG/DL (ref 70–99)
GLUCOSE BLD-MCNC: 197 MG/DL (ref 70–99)
GLUCOSE BLD-MCNC: 203 MG/DL (ref 70–99)
GLUCOSE BLD-MCNC: 216 MG/DL (ref 70–99)
GLUCOSE BLD-MCNC: 222 MG/DL (ref 70–99)
GLUCOSE BLD-MCNC: 222 MG/DL (ref 70–99)
GLUCOSE BLD-MCNC: 236 MG/DL (ref 70–99)
HBA1C MFR BLD HPLC: 7.8 % (ref ?–5.7)
HCT VFR BLD AUTO: 36.6 % (ref 39–53)
HCT VFR BLD AUTO: 36.9 % (ref 39–53)
HCT VFR BLD AUTO: 37 % (ref 39–53)
HCT VFR BLD AUTO: 37 % (ref 39–53)
HCT VFR BLD AUTO: 37.9 % (ref 39–53)
HCT VFR BLD AUTO: 38.3 % (ref 39–53)
HCT VFR BLD AUTO: 39.3 % (ref 39–53)
HCT VFR BLD AUTO: 40.6 % (ref 39–53)
HCT VFR BLD AUTO: 41.1 % (ref 39–53)
HCT VFR BLD AUTO: 41.3 % (ref 39–53)
HCT VFR BLD AUTO: 41.7 % (ref 39–53)
HCT VFR BLD AUTO: 42.4 % (ref 39–53)
HGB BLD-MCNC: 11.4 G/DL (ref 13–17.5)
HGB BLD-MCNC: 11.6 G/DL (ref 13–17.5)
HGB BLD-MCNC: 11.6 G/DL (ref 13–17.5)
HGB BLD-MCNC: 11.8 G/DL (ref 13–17.5)
HGB BLD-MCNC: 11.8 G/DL (ref 13–17.5)
HGB BLD-MCNC: 11.9 G/DL (ref 13–17.5)
HGB BLD-MCNC: 12 G/DL (ref 13–17.5)
HGB BLD-MCNC: 13 G/DL (ref 13–17.5)
HGB BLD-MCNC: 13.3 G/DL (ref 13–17.5)
HGB BLD-MCNC: 13.4 G/DL (ref 13–17.5)
HGB BLD-MCNC: 13.5 G/DL (ref 13–17.5)
HGB BLD-MCNC: 13.7 G/DL (ref 13–17.5)
IMM GRANULOCYTES # BLD AUTO: 0.02 X10(3) UL (ref 0–1)
IMM GRANULOCYTES # BLD AUTO: 0.03 X10(3) UL (ref 0–1)
IMM GRANULOCYTES # BLD AUTO: 0.04 X10(3) UL (ref 0–1)
IMM GRANULOCYTES # BLD AUTO: 0.04 X10(3) UL (ref 0–1)
IMM GRANULOCYTES # BLD AUTO: 0.07 X10(3) UL (ref 0–1)
IMM GRANULOCYTES # BLD AUTO: 0.34 X10(3) UL (ref 0–1)
IMM GRANULOCYTES NFR BLD: 0.4 %
IMM GRANULOCYTES NFR BLD: 0.5 %
IMM GRANULOCYTES NFR BLD: 0.5 %
IMM GRANULOCYTES NFR BLD: 0.7 %
IMM GRANULOCYTES NFR BLD: 2.5 %
LYMPHOCYTES # BLD AUTO: 1.22 X10(3) UL (ref 1–4)
LYMPHOCYTES # BLD AUTO: 1.45 X10(3) UL (ref 1–4)
LYMPHOCYTES # BLD AUTO: 1.48 X10(3) UL (ref 1–4)
LYMPHOCYTES # BLD AUTO: 1.49 X10(3) UL (ref 1–4)
LYMPHOCYTES # BLD AUTO: 1.68 X10(3) UL (ref 1–4)
LYMPHOCYTES # BLD AUTO: 1.7 X10(3) UL (ref 1–4)
LYMPHOCYTES # BLD AUTO: 1.76 X10(3) UL (ref 1–4)
LYMPHOCYTES # BLD AUTO: 1.81 X10(3) UL (ref 1–4)
LYMPHOCYTES # BLD AUTO: 1.82 X10(3) UL (ref 1–4)
LYMPHOCYTES NFR BLD AUTO: 13.2 %
LYMPHOCYTES NFR BLD AUTO: 16.6 %
LYMPHOCYTES NFR BLD AUTO: 17.5 %
LYMPHOCYTES NFR BLD AUTO: 18.5 %
LYMPHOCYTES NFR BLD AUTO: 19.4 %
LYMPHOCYTES NFR BLD AUTO: 22.3 %
LYMPHOCYTES NFR BLD AUTO: 22.3 %
LYMPHOCYTES NFR BLD AUTO: 22.6 %
LYMPHOCYTES NFR BLD AUTO: 23.5 %
LYMPHOCYTES NFR BLD: 1.43 X10(3) UL (ref 1–4)
LYMPHOCYTES NFR BLD: 11 %
LYMPHOCYTES NFR BLD: 19 %
LYMPHOCYTES NFR BLD: 2.17 X10(3) UL (ref 1–4)
LYMPHOCYTES NFR BLD: 2.38 X10(3) UL (ref 1–4)
LYMPHOCYTES NFR BLD: 9 %
M PROTEIN MFR SERPL ELPH: 7.1 G/DL (ref 6.4–8.2)
M PROTEIN MFR SERPL ELPH: 7.1 G/DL (ref 6.4–8.2)
M PROTEIN MFR SERPL ELPH: 7.2 G/DL (ref 6.4–8.2)
M PROTEIN MFR SERPL ELPH: 7.3 G/DL (ref 6.4–8.2)
M PROTEIN MFR SERPL ELPH: 7.5 G/DL (ref 6.4–8.2)
M PROTEIN MFR SERPL ELPH: 7.5 G/DL (ref 6.4–8.2)
M PROTEIN MFR SERPL ELPH: 7.6 G/DL (ref 6.4–8.2)
M PROTEIN MFR SERPL ELPH: 8.1 G/DL (ref 6.4–8.2)
MCH RBC QN AUTO: 29.2 PG (ref 26–34)
MCH RBC QN AUTO: 29.8 PG (ref 26–34)
MCH RBC QN AUTO: 29.9 PG (ref 26–34)
MCH RBC QN AUTO: 30.5 PG (ref 26–34)
MCH RBC QN AUTO: 31.2 PG (ref 26–34)
MCH RBC QN AUTO: 31.4 PG (ref 26–34)
MCH RBC QN AUTO: 31.6 PG (ref 26–34)
MCH RBC QN AUTO: 31.6 PG (ref 26–34)
MCH RBC QN AUTO: 32.3 PG (ref 26–34)
MCH RBC QN AUTO: 32.4 PG (ref 26–34)
MCH RBC QN AUTO: 32.5 PG (ref 26–34)
MCH RBC QN AUTO: 33.5 PG (ref 26–34)
MCHC RBC AUTO-ENTMCNC: 30.5 G/DL (ref 31–37)
MCHC RBC AUTO-ENTMCNC: 30.8 G/DL (ref 31–37)
MCHC RBC AUTO-ENTMCNC: 31.1 G/DL (ref 31–37)
MCHC RBC AUTO-ENTMCNC: 31.1 G/DL (ref 31–37)
MCHC RBC AUTO-ENTMCNC: 31.4 G/DL (ref 31–37)
MCHC RBC AUTO-ENTMCNC: 31.7 G/DL (ref 31–37)
MCHC RBC AUTO-ENTMCNC: 31.8 G/DL (ref 31–37)
MCHC RBC AUTO-ENTMCNC: 31.9 G/DL (ref 31–37)
MCHC RBC AUTO-ENTMCNC: 32 G/DL (ref 31–37)
MCHC RBC AUTO-ENTMCNC: 32.2 G/DL (ref 31–37)
MCHC RBC AUTO-ENTMCNC: 32.6 G/DL (ref 31–37)
MCHC RBC AUTO-ENTMCNC: 32.9 G/DL (ref 31–37)
MCV RBC AUTO: 101 FL (ref 80–100)
MCV RBC AUTO: 101.7 FL (ref 80–100)
MCV RBC AUTO: 102 FL (ref 80–100)
MCV RBC AUTO: 94.9 FL (ref 80–100)
MCV RBC AUTO: 95.7 FL (ref 80–100)
MCV RBC AUTO: 98 FL (ref 80–100)
MCV RBC AUTO: 98.1 FL (ref 80–100)
MCV RBC AUTO: 98.2 FL (ref 80–100)
MCV RBC AUTO: 99.2 FL (ref 80–100)
MCV RBC AUTO: 99.8 FL (ref 80–100)
METAMYELOCYTES # BLD: 0.5 X10(3) UL
METAMYELOCYTES # BLD: 0.52 X10(3) UL
METAMYELOCYTES # BLD: 1.69 X10(3) UL
METAMYELOCYTES NFR BLD: 4 %
METAMYELOCYTES NFR BLD: 4 %
METAMYELOCYTES NFR BLD: 7 %
MONOCYTES # BLD AUTO: 0.54 X10(3) UL (ref 0.1–1)
MONOCYTES # BLD AUTO: 0.69 X10(3) UL (ref 0.1–1)
MONOCYTES # BLD AUTO: 0.75 X10(3) UL (ref 0.1–1)
MONOCYTES # BLD AUTO: 0.84 X10(3) UL (ref 0.1–1)
MONOCYTES # BLD AUTO: 0.88 X10(3) UL (ref 0.1–1)
MONOCYTES # BLD AUTO: 0.92 X10(3) UL (ref 0.1–1)
MONOCYTES # BLD AUTO: 0.94 X10(3) UL (ref 0.1–1)
MONOCYTES # BLD AUTO: 0.95 X10(3) UL (ref 0.1–1)
MONOCYTES # BLD AUTO: 1.33 X10(3) UL (ref 0.1–1)
MONOCYTES # BLD: 0.48 X10(3) UL (ref 0.1–1)
MONOCYTES # BLD: 0.75 X10(3) UL (ref 0.1–1)
MONOCYTES # BLD: 0.91 X10(3) UL (ref 0.1–1)
MONOCYTES NFR BLD AUTO: 10.2 %
MONOCYTES NFR BLD AUTO: 10.3 %
MONOCYTES NFR BLD AUTO: 10.9 %
MONOCYTES NFR BLD AUTO: 11.3 %
MONOCYTES NFR BLD AUTO: 11.6 %
MONOCYTES NFR BLD AUTO: 12.5 %
MONOCYTES NFR BLD AUTO: 12.7 %
MONOCYTES NFR BLD AUTO: 5.5 %
MONOCYTES NFR BLD AUTO: 9.9 %
MONOCYTES NFR BLD: 2 %
MONOCYTES NFR BLD: 6 %
MONOCYTES NFR BLD: 7 %
MORPHOLOGY: NORMAL
MORPHOLOGY: NORMAL
MYELOCYTES # BLD: 0.38 X10(3) UL
MYELOCYTES # BLD: 0.48 X10(3) UL
MYELOCYTES NFR BLD: 2 %
MYELOCYTES NFR BLD: 3 %
NEUTROPHILS # BLD AUTO: 10.58 X10 (3) UL (ref 1.5–7.7)
NEUTROPHILS # BLD AUTO: 10.58 X10(3) UL (ref 1.5–7.7)
NEUTROPHILS # BLD AUTO: 16.73 X10 (3) UL (ref 1.5–7.7)
NEUTROPHILS # BLD AUTO: 3.49 X10 (3) UL (ref 1.5–7.7)
NEUTROPHILS # BLD AUTO: 3.49 X10(3) UL (ref 1.5–7.7)
NEUTROPHILS # BLD AUTO: 3.77 X10 (3) UL (ref 1.5–7.7)
NEUTROPHILS # BLD AUTO: 3.77 X10(3) UL (ref 1.5–7.7)
NEUTROPHILS # BLD AUTO: 4.37 X10 (3) UL (ref 1.5–7.7)
NEUTROPHILS # BLD AUTO: 4.37 X10(3) UL (ref 1.5–7.7)
NEUTROPHILS # BLD AUTO: 4.82 X10 (3) UL (ref 1.5–7.7)
NEUTROPHILS # BLD AUTO: 4.82 X10(3) UL (ref 1.5–7.7)
NEUTROPHILS # BLD AUTO: 4.95 X10 (3) UL (ref 1.5–7.7)
NEUTROPHILS # BLD AUTO: 4.95 X10(3) UL (ref 1.5–7.7)
NEUTROPHILS # BLD AUTO: 5.51 X10 (3) UL (ref 1.5–7.7)
NEUTROPHILS # BLD AUTO: 5.51 X10(3) UL (ref 1.5–7.7)
NEUTROPHILS # BLD AUTO: 5.92 X10 (3) UL (ref 1.5–7.7)
NEUTROPHILS # BLD AUTO: 5.92 X10(3) UL (ref 1.5–7.7)
NEUTROPHILS # BLD AUTO: 6.72 X10 (3) UL (ref 1.5–7.7)
NEUTROPHILS # BLD AUTO: 6.72 X10(3) UL (ref 1.5–7.7)
NEUTROPHILS # BLD AUTO: 6.91 X10 (3) UL (ref 1.5–7.7)
NEUTROPHILS # BLD AUTO: 9.1 X10 (3) UL (ref 1.5–7.7)
NEUTROPHILS NFR BLD AUTO: 58.8 %
NEUTROPHILS NFR BLD AUTO: 59.6 %
NEUTROPHILS NFR BLD AUTO: 60.6 %
NEUTROPHILS NFR BLD AUTO: 63.2 %
NEUTROPHILS NFR BLD AUTO: 63.4 %
NEUTROPHILS NFR BLD AUTO: 63.9 %
NEUTROPHILS NFR BLD AUTO: 64.5 %
NEUTROPHILS NFR BLD AUTO: 66.6 %
NEUTROPHILS NFR BLD AUTO: 76.7 %
NEUTROPHILS NFR BLD: 56 %
NEUTROPHILS NFR BLD: 67 %
NEUTROPHILS NFR BLD: 67 %
NEUTS BAND NFR BLD: 10 %
NEUTS BAND NFR BLD: 12 %
NEUTS BAND NFR BLD: 7 %
NEUTS HYPERSEG # BLD: 18.56 X10(3) UL (ref 1.5–7.7)
NEUTS HYPERSEG # BLD: 8.5 X10(3) UL (ref 1.5–7.7)
NEUTS HYPERSEG # BLD: 9.62 X10(3) UL (ref 1.5–7.7)
OSMOLALITY SERPL CALC.SUM OF ELEC: 285 MOSM/KG (ref 275–295)
OSMOLALITY SERPL CALC.SUM OF ELEC: 292 MOSM/KG (ref 275–295)
OSMOLALITY SERPL CALC.SUM OF ELEC: 292 MOSM/KG (ref 275–295)
OSMOLALITY SERPL CALC.SUM OF ELEC: 293 MOSM/KG (ref 275–295)
OSMOLALITY SERPL CALC.SUM OF ELEC: 294 MOSM/KG (ref 275–295)
OSMOLALITY SERPL CALC.SUM OF ELEC: 295 MOSM/KG (ref 275–295)
OSMOLALITY SERPL CALC.SUM OF ELEC: 296 MOSM/KG (ref 275–295)
OSMOLALITY SERPL CALC.SUM OF ELEC: 297 MOSM/KG (ref 275–295)
OSMOLALITY SERPL CALC.SUM OF ELEC: 297 MOSM/KG (ref 275–295)
OSMOLALITY SERPL CALC.SUM OF ELEC: 298 MOSM/KG (ref 275–295)
PATIENT FASTING Y/N/NP: NO
PLATELET # BLD AUTO: 112 10(3)UL (ref 150–450)
PLATELET # BLD AUTO: 120 10(3)UL (ref 150–450)
PLATELET # BLD AUTO: 121 10(3)UL (ref 150–450)
PLATELET # BLD AUTO: 144 10(3)UL (ref 150–450)
PLATELET # BLD AUTO: 147 10(3)UL (ref 150–450)
PLATELET # BLD AUTO: 172 10(3)UL (ref 150–450)
PLATELET # BLD AUTO: 179 10(3)UL (ref 150–450)
PLATELET # BLD AUTO: 220 10(3)UL (ref 150–450)
PLATELET # BLD AUTO: 223 10(3)UL (ref 150–450)
PLATELET # BLD AUTO: 246 10(3)UL (ref 150–450)
PLATELET # BLD AUTO: 91 10(3)UL (ref 150–450)
PLATELET # BLD AUTO: 97 10(3)UL (ref 150–450)
PLATELET MORPHOLOGY: NORMAL
PLATELET MORPHOLOGY: NORMAL
POTASSIUM SERPL-SCNC: 3.2 MMOL/L (ref 3.5–5.1)
POTASSIUM SERPL-SCNC: 3.3 MMOL/L (ref 3.5–5.1)
POTASSIUM SERPL-SCNC: 3.5 MMOL/L (ref 3.5–5.1)
POTASSIUM SERPL-SCNC: 3.5 MMOL/L (ref 3.5–5.1)
POTASSIUM SERPL-SCNC: 3.6 MMOL/L (ref 3.5–5.1)
POTASSIUM SERPL-SCNC: 3.7 MMOL/L (ref 3.5–5.1)
POTASSIUM SERPL-SCNC: 3.8 MMOL/L (ref 3.5–5.1)
POTASSIUM SERPL-SCNC: 3.9 MMOL/L (ref 3.5–5.1)
POTASSIUM SERPL-SCNC: 4 MMOL/L (ref 3.5–5.1)
POTASSIUM SERPL-SCNC: 4.2 MMOL/L (ref 3.5–5.1)
RBC # BLD AUTO: 3.69 X10(6)UL (ref 3.8–5.8)
RBC # BLD AUTO: 3.72 X10(6)UL (ref 3.8–5.8)
RBC # BLD AUTO: 3.73 X10(6)UL (ref 3.8–5.8)
RBC # BLD AUTO: 3.9 X10(6)UL (ref 3.8–5.8)
RBC # BLD AUTO: 3.9 X10(6)UL (ref 3.8–5.8)
RBC # BLD AUTO: 3.96 X10(6)UL (ref 3.8–5.8)
RBC # BLD AUTO: 4.01 X10(6)UL (ref 3.8–5.8)
RBC # BLD AUTO: 4.02 X10(6)UL (ref 3.8–5.8)
RBC # BLD AUTO: 4.09 X10(6)UL (ref 3.8–5.8)
RBC # BLD AUTO: 4.12 X10(6)UL (ref 3.8–5.8)
RBC # BLD AUTO: 4.17 X10(6)UL (ref 3.8–5.8)
RBC # BLD AUTO: 4.21 X10(6)UL (ref 3.8–5.8)
SODIUM SERPL-SCNC: 135 MMOL/L (ref 136–145)
SODIUM SERPL-SCNC: 138 MMOL/L (ref 136–145)
SODIUM SERPL-SCNC: 139 MMOL/L (ref 136–145)
SODIUM SERPL-SCNC: 139 MMOL/L (ref 136–145)
SODIUM SERPL-SCNC: 140 MMOL/L (ref 136–145)
SODIUM SERPL-SCNC: 140 MMOL/L (ref 136–145)
SODIUM SERPL-SCNC: 141 MMOL/L (ref 136–145)
SODIUM SERPL-SCNC: 142 MMOL/L (ref 136–145)
SODIUM SERPL-SCNC: 142 MMOL/L (ref 136–145)
TOTAL CELLS COUNTED: 100
WBC # BLD AUTO: 10.6 X10(3) UL (ref 4–11)
WBC # BLD AUTO: 12.5 X10(3) UL (ref 4–11)
WBC # BLD AUTO: 13 X10(3) UL (ref 4–11)
WBC # BLD AUTO: 13.8 X10(3) UL (ref 4–11)
WBC # BLD AUTO: 24.1 X10(3) UL (ref 4–11)
WBC # BLD AUTO: 5.5 X10(3) UL (ref 4–11)
WBC # BLD AUTO: 6.3 X10(3) UL (ref 4–11)
WBC # BLD AUTO: 7.4 X10(3) UL (ref 4–11)
WBC # BLD AUTO: 7.6 X10(3) UL (ref 4–11)
WBC # BLD AUTO: 8.2 X10(3) UL (ref 4–11)
WBC # BLD AUTO: 8.3 X10(3) UL (ref 4–11)
WBC # BLD AUTO: 9.2 X10(3) UL (ref 4–11)

## 2020-01-01 PROCEDURE — 99215 OFFICE O/P EST HI 40 MIN: CPT | Performed by: INTERNAL MEDICINE

## 2020-01-01 PROCEDURE — 96415 CHEMO IV INFUSION ADDL HR: CPT

## 2020-01-01 PROCEDURE — 96417 CHEMO IV INFUS EACH ADDL SEQ: CPT

## 2020-01-01 PROCEDURE — 96368 THER/DIAG CONCURRENT INF: CPT

## 2020-01-01 PROCEDURE — 96413 CHEMO IV INFUSION 1 HR: CPT

## 2020-01-01 PROCEDURE — 80053 COMPREHEN METABOLIC PANEL: CPT

## 2020-01-01 PROCEDURE — 96375 TX/PRO/DX INJ NEW DRUG ADDON: CPT

## 2020-01-01 PROCEDURE — 85025 COMPLETE CBC W/AUTO DIFF WBC: CPT

## 2020-01-01 PROCEDURE — 71260 CT THORAX DX C+: CPT | Performed by: INTERNAL MEDICINE

## 2020-01-01 PROCEDURE — 85027 COMPLETE CBC AUTOMATED: CPT

## 2020-01-01 PROCEDURE — 99214 OFFICE O/P EST MOD 30 MIN: CPT | Performed by: NURSE PRACTITIONER

## 2020-01-01 PROCEDURE — 99213 OFFICE O/P EST LOW 20 MIN: CPT | Performed by: FAMILY MEDICINE

## 2020-01-01 PROCEDURE — 83036 HEMOGLOBIN GLYCOSYLATED A1C: CPT

## 2020-01-01 PROCEDURE — 96411 CHEMO IV PUSH ADDL DRUG: CPT

## 2020-01-01 PROCEDURE — 86301 IMMUNOASSAY TUMOR CA 19-9: CPT

## 2020-01-01 PROCEDURE — 96523 IRRIG DRUG DELIVERY DEVICE: CPT

## 2020-01-01 PROCEDURE — 99214 OFFICE O/P EST MOD 30 MIN: CPT | Performed by: INTERNAL MEDICINE

## 2020-01-01 PROCEDURE — 96372 THER/PROPH/DIAG INJ SC/IM: CPT

## 2020-01-01 PROCEDURE — 96367 TX/PROPH/DG ADDL SEQ IV INF: CPT

## 2020-01-01 PROCEDURE — 74177 CT ABD & PELVIS W/CONTRAST: CPT | Performed by: INTERNAL MEDICINE

## 2020-01-01 PROCEDURE — 36591 DRAW BLOOD OFF VENOUS DEVICE: CPT

## 2020-01-01 PROCEDURE — 96549 UNLISTED CHEMOTHERAPY PX: CPT

## 2020-01-01 PROCEDURE — 85007 BL SMEAR W/DIFF WBC COUNT: CPT

## 2020-01-01 RX ORDER — LANCETS 33 GAUGE
EACH MISCELLANEOUS
Qty: 1 BOX | Refills: 0 | Status: SHIPPED | OUTPATIENT
Start: 2020-01-01

## 2020-01-01 RX ORDER — PROCHLORPERAZINE MALEATE 10 MG
10 TABLET ORAL EVERY 6 HOURS PRN
Status: CANCELLED | OUTPATIENT
Start: 2020-01-01

## 2020-01-01 RX ORDER — LORAZEPAM 0.5 MG/1
TABLET ORAL EVERY 4 HOURS PRN
Status: CANCELLED | OUTPATIENT
Start: 2020-01-01

## 2020-01-01 RX ORDER — BLOOD-GLUCOSE METER
1 EACH MISCELLANEOUS DAILY
Qty: 1 KIT | Refills: 0 | Status: SHIPPED | OUTPATIENT
Start: 2020-01-01 | End: 2020-01-01

## 2020-01-01 RX ORDER — SODIUM CHLORIDE 0.9 % (FLUSH) 0.9 %
10 SYRINGE (ML) INJECTION ONCE
Status: COMPLETED | OUTPATIENT
Start: 2020-01-01 | End: 2020-01-01

## 2020-01-01 RX ORDER — SODIUM CHLORIDE 0.9 % (FLUSH) 0.9 %
10 SYRINGE (ML) INJECTION ONCE
Status: CANCELLED | OUTPATIENT
Start: 2020-01-01

## 2020-01-01 RX ORDER — ONDANSETRON 2 MG/ML
8 INJECTION INTRAMUSCULAR; INTRAVENOUS EVERY 6 HOURS PRN
Status: CANCELLED | OUTPATIENT
Start: 2020-01-01

## 2020-01-01 RX ORDER — FLUOROURACIL 50 MG/ML
2400 INJECTION, SOLUTION INTRAVENOUS CONTINUOUS
Status: DISCONTINUED | OUTPATIENT
Start: 2020-01-01 | End: 2020-01-01

## 2020-01-01 RX ORDER — FLUOROURACIL 50 MG/ML
2400 INJECTION, SOLUTION INTRAVENOUS CONTINUOUS
Status: CANCELLED | OUTPATIENT
Start: 2020-01-01

## 2020-01-01 RX ORDER — FLUOROURACIL 50 MG/ML
400 INJECTION, SOLUTION INTRAVENOUS ONCE
Status: CANCELLED | OUTPATIENT
Start: 2020-01-01

## 2020-01-01 RX ORDER — OLANZAPINE 2.5 MG/1
TABLET ORAL NIGHTLY
Qty: 60 TABLET | Refills: 3 | Status: SHIPPED | OUTPATIENT
Start: 2020-01-01

## 2020-01-01 RX ORDER — ATROPINE SULFATE 0.4 MG/ML
0.2 AMPUL (ML) INJECTION ONCE
Status: DISCONTINUED | OUTPATIENT
Start: 2020-01-01 | End: 2020-01-01

## 2020-01-01 RX ORDER — ATROPINE SULFATE 0.4 MG/ML
0.2 AMPUL (ML) INJECTION ONCE
Status: COMPLETED | OUTPATIENT
Start: 2020-01-01 | End: 2020-01-01

## 2020-01-01 RX ORDER — POTASSIUM CHLORIDE 750 MG/1
40 TABLET, EXTENDED RELEASE ORAL ONCE
Status: CANCELLED
Start: 2020-01-01

## 2020-01-01 RX ORDER — LORAZEPAM 2 MG/ML
INJECTION INTRAMUSCULAR EVERY 4 HOURS PRN
Status: CANCELLED | OUTPATIENT
Start: 2020-01-01

## 2020-01-01 RX ORDER — FLUOROURACIL 50 MG/ML
1800 INJECTION, SOLUTION INTRAVENOUS CONTINUOUS
Status: CANCELLED | OUTPATIENT
Start: 2020-01-01

## 2020-01-01 RX ORDER — POTASSIUM CHLORIDE 20 MEQ/1
40 TABLET, EXTENDED RELEASE ORAL DAILY
Status: DISCONTINUED | OUTPATIENT
Start: 2020-01-01 | End: 2020-01-01

## 2020-01-01 RX ORDER — FLUOROURACIL 50 MG/ML
400 INJECTION, SOLUTION INTRAVENOUS ONCE
Status: COMPLETED | OUTPATIENT
Start: 2020-01-01 | End: 2020-01-01

## 2020-01-01 RX ORDER — ATROPINE SULFATE 0.4 MG/ML
0.2 AMPUL (ML) INJECTION ONCE
Status: CANCELLED | OUTPATIENT
Start: 2020-01-01

## 2020-01-01 RX ORDER — METOCLOPRAMIDE HYDROCHLORIDE 5 MG/ML
10 INJECTION INTRAMUSCULAR; INTRAVENOUS EVERY 6 HOURS PRN
Status: CANCELLED | OUTPATIENT
Start: 2020-01-01

## 2020-01-01 RX ORDER — POTASSIUM CHLORIDE 750 MG/1
40 TABLET, EXTENDED RELEASE ORAL DAILY
Status: CANCELLED
Start: 2020-01-01

## 2020-01-01 RX ORDER — HYDROCODONE BITARTRATE AND ACETAMINOPHEN 5; 325 MG/1; MG/1
1 TABLET ORAL EVERY 4 HOURS PRN
COMMUNITY

## 2020-01-01 RX ORDER — OLANZAPINE 5 MG/1
2.5 TABLET ORAL DAILY
COMMUNITY
End: 2020-01-01

## 2020-01-01 RX ORDER — FLUOROURACIL 50 MG/ML
1800 INJECTION, SOLUTION INTRAVENOUS CONTINUOUS
Status: DISCONTINUED | OUTPATIENT
Start: 2020-01-01 | End: 2020-01-01

## 2020-01-01 RX ORDER — LANCETS
EACH MISCELLANEOUS
Qty: 100 EACH | Refills: 1 | Status: SHIPPED | OUTPATIENT
Start: 2020-01-01 | End: 2020-01-01 | Stop reason: CLARIF

## 2020-01-01 RX ORDER — METFORMIN HYDROCHLORIDE 500 MG/1
TABLET, EXTENDED RELEASE ORAL
Qty: 360 TABLET | Refills: 1 | Status: SHIPPED | OUTPATIENT
Start: 2020-01-01

## 2020-01-01 RX ORDER — ATORVASTATIN CALCIUM 20 MG/1
TABLET, FILM COATED ORAL
Qty: 90 TABLET | Refills: 0 | Status: SHIPPED | OUTPATIENT
Start: 2020-01-01

## 2020-01-01 RX ORDER — POTASSIUM CHLORIDE 20 MEQ/1
40 TABLET, EXTENDED RELEASE ORAL ONCE
Status: COMPLETED | OUTPATIENT
Start: 2020-01-01 | End: 2020-01-01

## 2020-01-01 RX ORDER — BLOOD-GLUCOSE METER
1 EACH MISCELLANEOUS DAILY
Qty: 1 KIT | Refills: 0 | Status: SHIPPED | OUTPATIENT
Start: 2020-01-01 | End: 2021-08-07

## 2020-01-01 RX ORDER — ONDANSETRON HYDROCHLORIDE 8 MG/1
8 TABLET, FILM COATED ORAL EVERY 8 HOURS PRN
Qty: 30 TABLET | Refills: 3 | Status: SHIPPED | OUTPATIENT
Start: 2020-01-01 | End: 2020-01-01

## 2020-01-01 RX ORDER — OMEPRAZOLE 20 MG/1
20 CAPSULE, DELAYED RELEASE ORAL
COMMUNITY

## 2020-01-01 RX ORDER — APIXABAN 5 MG/1
TABLET, FILM COATED ORAL
Qty: 60 TABLET | Refills: 11 | Status: SHIPPED | OUTPATIENT
Start: 2020-01-01

## 2020-01-01 RX ADMIN — SODIUM CHLORIDE 0.9 % (FLUSH) 10 ML: 0.9 % SYRINGE (ML) INJECTION at 10:57:00

## 2020-01-01 RX ADMIN — FLUOROURACIL 800 MG: 50 INJECTION, SOLUTION INTRAVENOUS at 15:17:00

## 2020-01-01 RX ADMIN — FLUOROURACIL 5000 MG: 50 INJECTION, SOLUTION INTRAVENOUS at 15:25:00

## 2020-01-01 RX ADMIN — ATROPINE SULFATE 0.2 MG: 0.4 MG/ML AMPUL (ML) INJECTION at 13:35:00

## 2020-01-01 RX ADMIN — ATROPINE SULFATE 0.2 MG: 0.4 MG/ML AMPUL (ML) INJECTION at 10:06:00

## 2020-01-01 RX ADMIN — FLUOROURACIL 4900 MG: 50 INJECTION, SOLUTION INTRAVENOUS at 13:31:00

## 2020-01-01 RX ADMIN — FLUOROURACIL 4950 MG: 50 INJECTION, SOLUTION INTRAVENOUS at 12:31:00

## 2020-01-01 RX ADMIN — ATROPINE SULFATE 0.2 MG: 0.4 MG/ML AMPUL (ML) INJECTION at 10:54:00

## 2020-01-01 RX ADMIN — FLUOROURACIL 850 MG: 50 INJECTION, SOLUTION INTRAVENOUS at 13:05:00

## 2020-01-01 RX ADMIN — FLUOROURACIL 5000 MG: 50 INJECTION, SOLUTION INTRAVENOUS at 13:51:00

## 2020-01-01 RX ADMIN — FLUOROURACIL 850 MG: 50 INJECTION, SOLUTION INTRAVENOUS at 15:18:00

## 2020-01-01 RX ADMIN — FLUOROURACIL 800 MG: 50 INJECTION, SOLUTION INTRAVENOUS at 14:51:00

## 2020-01-01 RX ADMIN — FLUOROURACIL 5000 MG: 50 INJECTION, SOLUTION INTRAVENOUS at 13:10:00

## 2020-01-01 RX ADMIN — FLUOROURACIL 850 MG: 50 INJECTION, SOLUTION INTRAVENOUS at 13:45:00

## 2020-01-01 RX ADMIN — SODIUM CHLORIDE 0.9 % (FLUSH) 10 ML: 0.9 % SYRINGE (ML) INJECTION at 12:45:00

## 2020-01-01 RX ADMIN — FLUOROURACIL 800 MG: 50 INJECTION, SOLUTION INTRAVENOUS at 11:54:00

## 2020-01-01 RX ADMIN — SODIUM CHLORIDE 0.9 % (FLUSH) 10 ML: 0.9 % SYRINGE (ML) INJECTION at 10:02:00

## 2020-01-01 RX ADMIN — FLUOROURACIL 4900 MG: 50 INJECTION, SOLUTION INTRAVENOUS at 14:58:00

## 2020-01-01 RX ADMIN — FLUOROURACIL 800 MG: 50 INJECTION, SOLUTION INTRAVENOUS at 12:31:00

## 2020-01-01 RX ADMIN — FLUOROURACIL 4850 MG: 50 INJECTION, SOLUTION INTRAVENOUS at 15:18:00

## 2020-01-01 RX ADMIN — FLUOROURACIL 800 MG: 50 INJECTION, SOLUTION INTRAVENOUS at 13:25:00

## 2020-01-01 RX ADMIN — POTASSIUM CHLORIDE 40 MEQ: 20 TABLET, EXTENDED RELEASE ORAL at 10:42:00

## 2020-01-01 RX ADMIN — FLUOROURACIL 800 MG: 50 INJECTION, SOLUTION INTRAVENOUS at 13:36:00

## 2020-01-01 RX ADMIN — SODIUM CHLORIDE 0.9 % (FLUSH) 10 ML: 0.9 % SYRINGE (ML) INJECTION at 12:59:00

## 2020-01-01 RX ADMIN — FLUOROURACIL 4950 MG: 50 INJECTION, SOLUTION INTRAVENOUS at 12:53:00

## 2020-01-01 RX ADMIN — FLUOROURACIL 800 MG: 50 INJECTION, SOLUTION INTRAVENOUS at 15:12:00

## 2020-01-01 RX ADMIN — FLUOROURACIL 4950 MG: 50 INJECTION, SOLUTION INTRAVENOUS at 11:54:00

## 2020-01-01 RX ADMIN — FLUOROURACIL 4900 MG: 50 INJECTION, SOLUTION INTRAVENOUS at 15:23:00

## 2020-01-01 RX ADMIN — FLUOROURACIL 3700 MG: 50 INJECTION, SOLUTION INTRAVENOUS at 13:41:00

## 2020-01-01 RX ADMIN — POTASSIUM CHLORIDE 40 MEQ: 20 TABLET, EXTENDED RELEASE ORAL at 10:24:00

## 2020-01-01 RX ADMIN — FLUOROURACIL 800 MG: 50 INJECTION, SOLUTION INTRAVENOUS at 12:45:00

## 2020-01-02 NOTE — PROGRESS NOTES
Patient presents with: Follow - Up: APN assessment prior to treatment    Pt is here for treatment; C19 D1 folfirinox is expected. With assistance from his daughter with some translation - pt reports that he is doing well.  He returned from [facility] recently

## 2020-01-02 NOTE — PROGRESS NOTES
Pt here for C19 FOLFIRI.   Arrives Ambulating independently, accompanied by Family member           Patient reports possible pregnancy since last therapy cycle: Not Applicable    Modifications in dose or schedule: No     Frequency of blood return and site c

## 2020-01-02 NOTE — PROGRESS NOTES
ANP Visit Note    Patient Name: Jaime German   YOB: 1950   Medical Record Number: [id number]   CSN: [id number]   Date of visit: 1/2/2020     Chief Complaint/Reason for Visit:  No chief complaint on file.      History of Present Illness: Vivien Number of children: Not on file      Years of education: Not on file      Highest education level: Not on file    Occupational History      Not on file    Social Needs      Financial resource strain: Not on file      Food insecurity:        Worry: Not on tablet, Rfl: 3  •  pancrelipase, Lip-Prot-Amyl, 5000-30790 units Oral Cap DR Particles, Take 1 capsule (5,000 Units total) by mouth 3 (three) times daily with meals. , Disp: 90 capsule, Rfl: 3  •  Cholecalciferol (VITAMIN D3 OR), Take by mouth., Disp: , Rfl Psych/Depression: mood and affect are appropriate.      Labs:     Recent Results (from the past 72 hour(s))   CARBOHYDRATE ANTIGEN 19-9    Collection Time: 01/02/20  9:21 AM   Result Value Ref Range    Carbohydrate Ag 19-9 4,233.2 (H) <=37.0 U/mL   COMP M Granulocyte % 2.5 %       Impression/Plan     # Metastatic pancreatic carcinoma (MS-stable): metastatic pancreatic carcinoma diagnosed 2/19, lung and liver metastasis at presentation. Started palliative chemotherapy with FOLFIRINOX on 3/12/19.  Given good

## 2020-01-04 NOTE — PROGRESS NOTES
Education Record    Learner:  Patient    Disease / Diagnosis: udenyca and pump disconnect    Barriers / Limitations:  None   Comments:    Method:  Brief focused   Comments:    General Topics:  Plan of care reviewed   Comments:    Outcome:  Shows understand

## 2020-01-14 NOTE — PROGRESS NOTES
[facility] Progress Note      Patient Name:  Lise Jalloh  YOB: 1950  Medical Record Number:  [id number]    Date of visit: 1/15/2020    CHIEF COMPLAINT: Metastatic pancreatic carcinoma, daphne PE, L leg DVT.     HPI:     71year old t • ATORVASTATIN 20 MG Oral Tab TAKE 1 TABLET BY MOUTH EVERY DAY AT BEDTIME 90 tablet 3   • pancrelipase, Lip-Prot-Amyl, 5000-31249 units Oral Cap DR Particles Take 1 capsule (5,000 Units total) by mouth 3 (three) times daily with meals.  90 capsule 3   • C mg/dL    Creatinine 1.03 0.70 - 1.30 mg/dL    BUN/CREA Ratio 9.7 (L) 10.0 - 20.0    Calcium, Total 9.0 8.5 - 10.1 mg/dL    Calculated Osmolality 295 275 - 295 mOsm/kg    GFR, Non- 74 >=60    GFR, -American 85 >=60    AST 34 15 - 37 U ONLY)(CPT=71260/49551)    Return for Pump disconnect, inj 2 days. Labs, MD, chemo 2 weeks. Zainab Givens M.D.     [facility]    [address], [address]    1/15/2020

## 2020-01-15 NOTE — PROGRESS NOTES
Pt here for C20D1.   Arrives Ambulating independently, accompanied by Self and Family member           Patient reports possible pregnancy since last therapy cycle: Not Applicable    Modifications in dose or schedule: No     Frequency of blood return and sit

## 2020-01-15 NOTE — PROGRESS NOTES
MD f/u for pancreatic ca. Due for C20 FOLFIRI today. Reports that last Sunday, approx 8 pm, pt felt a strong throbbing pain near center of abdomen that radiated straight through to his back.  Pain subsided after taking tylenol and he has not felt it since t

## 2020-01-17 NOTE — PROGRESS NOTES
Education Record    Learner:  Patient    Disease / Diagnosis:  Pancreatic CA - udenyca injection    Barriers / Limitations:  None   Comments:    Method:  Brief focused and Reinforcement   Comments:    General Topics:  Plan of care reviewed   Comments:    O

## 2020-01-29 NOTE — PROGRESS NOTES
MD f/u for pancreatic ca. Due for C21 FOLFIRI. Denies any N/V/C/D. Denies pain or abdominal discomfort. Has continues numbness to toes, but states it is not bothersome. Has ongoing insomnia, but much worse the night after treatment.      Education Record

## 2020-01-29 NOTE — PROGRESS NOTES
Pt here for C 21 D 1 Irinotecan/5fu.   Arrives Ambulating independently, accompanied by Self and Family member           Patient reports possible pregnancy since last therapy cycle: Not Applicable    Modifications in dose or schedule: No     Frequency of bl

## 2020-02-10 NOTE — TELEPHONE ENCOUNTER
From: Terrie Fountain  To: Rafy Owen MD  Sent: 2/10/2020 10:58 AM CST  Subject: Other    Hello,     We were thinking about going ahead and scheduling another future appointment for my dad after the one coming up. The date would be March 11th.  We know

## 2020-02-25 NOTE — PROGRESS NOTES
[facility] Progress Note      Patient Name:  Jaime German  YOB: 1950  Medical Record Number:  [id number]    Date of visit: 2/26/2020    CHIEF COMPLAINT: Metastatic pancreatic carcinoma, daphne PE, L leg DVT.     ONCOLOGY HISTORY:    L mg total) by mouth nightly. 60 tablet 3   • apixaban (ELIQUIS) 5 MG Oral Tab Take 1 tablet (5 mg total) by mouth 2 (two) times daily.  60 tablet 6   • ATORVASTATIN 20 MG Oral Tab TAKE 1 TABLET BY MOUTH EVERY DAY AT BEDTIME 90 tablet 3   • pancrelipase, Lip- stable of the 6 mm irregular nodule in the anterior inferior right lung base. There is a new 1.2 x 0.6 cm nodule in the central aspect of the anterior segment of the right upper lobe. The previous lead noted mosaic lung parenchymal pattern has resolved. measuring 4.4 x 3.1 cm, previously 3.6 x 2.9 cm. BILIARY:  Uncomplicated cholelithiasis unchanged. No biliary tree dilation. PANCREAS:  There is increase in the size of the tail of the pancreas mass previously measured 1. 8 x 1.1, presently 2.1 x 1.7.  SPL Value Ref Range    Glucose 170 (H) 70 - 99 mg/dL    Sodium 141 136 - 145 mmol/L    Potassium 3.5 3.5 - 5.1 mmol/L    Chloride 108 98 - 112 mmol/L    CO2 26.0 21.0 - 32.0 mmol/L    Anion Gap 7 0 - 18 mmol/L    BUN 14 7 - 18 mg/dL    Creatinine 0.93 0.70 - 1 far with improvement radiologically and tumor marker wise. Genetic testing negative. Restaging 11/19-improvement. Restaging CT 1/20 shows progression with new nodules in lung and liver as well as lytic area left fourth rib.   Options include adding oxalipl

## 2020-02-26 NOTE — PROGRESS NOTES
MD f/u for pancreatic ca. C22 FOLFIRI expected today. CT scan completed 1/31- will discuss results and POC. Recently back from [facility]. Reports that he missed 3 days of eliquis last week. Had BLE edema, but it has resolved. Denies any new pain or SOB.

## 2020-02-26 NOTE — PROGRESS NOTES
Pt here for C22D1 FOLFOX.   Arrives Ambulating independently, accompanied by Family member           Patient reports possible pregnancy since last therapy cycle: Not Applicable    Modifications in dose or schedule: Yes - removing irinotecan and atropine and

## 2020-02-27 NOTE — PROGRESS NOTES
Nutrition screen complete as triggered by [facility] dx of Metastatic pancreatic carcinoma. Chart reviewed. Pt appears  Nutritionally stable at present. RD available on consult.

## 2020-02-28 NOTE — TELEPHONE ENCOUNTER
I'd like to see him eventually, but no urgency if it's rough to get here now (in addition I'd hate him to  a germ here while it's still flu season. Guadalupe Riedel Guadalupe Riedel perhaps wait a few months when flu season calming down)

## 2020-02-28 NOTE — TELEPHONE ENCOUNTER
Pt got a call asking to set up a medicare wellness, daughter wants to know if he needs this. He is getting cancer treatments ever other week.

## 2020-03-10 NOTE — PROGRESS NOTES
[facility] Progress Note      Patient Name:  Lise Jalloh  YOB: 1950  Medical Record Number:  [id number]    Date of visit: 3/11/2020    CHIEF COMPLAINT: Metastatic pancreatic carcinoma, daphne PE, L leg DVT.     ONCOLOGY HISTORY:    L Oral Tab Take 1-2 tablets (2.5-5 mg total) by mouth nightly. 60 tablet 3   • apixaban (ELIQUIS) 5 MG Oral Tab Take 1 tablet (5 mg total) by mouth 2 (two) times daily.  60 tablet 6   • ATORVASTATIN 20 MG Oral Tab TAKE 1 TABLET BY MOUTH EVERY DAY AT BEDTIME 9 pancreatic carcinoma diagnosed 2/19, lung and liver metastasis at presentation. Started palliative chemotherapy with FOLFIRINOX on 3/12/19. Oxaliplatin held after cycle # 7.   He has been responding thus far with improvement radiologically and tumor marker 90873    3/11/2020

## 2020-03-11 NOTE — PROGRESS NOTES
Pt here for C23D1 FOLFOX.   Arrives Ambulating independently, accompanied by Family member           Patient reports possible pregnancy since last therapy cycle: Not Applicable    Modifications in dose or schedule: YES oxaliplatin Dose reduced due to PLT of

## 2020-03-11 NOTE — PROGRESS NOTES
Outpatient Oncology Care Plan  Problem list:  loss of appetite  fatigue  knowledge deficit  peripheral neuropathy    Problems related to:    chemotherapy  side effect of treatment    Interventions:  emotional support given  patient/family supported  encour

## 2020-03-17 NOTE — TELEPHONE ENCOUNTER
Pancreatic Carcinoma - 3/11/20 - Folfirinox C 23 D1    Diarrhea - Grade 1 - having diarrhea with abdominal cramps since last night, unsure of # of stools from yesterday, today has had 2 stools with  imodium at 1230 followed by another stool 2 hours later-

## 2020-03-24 NOTE — PROGRESS NOTES
[facility] Progress Note      Patient Name:  Leida Brown  YOB: 1950  Medical Record Number:  [id number]    Date of visit: 3/25/2020    CHIEF COMPLAINT: Metastatic pancreatic carcinoma, daphne PE, L leg DVT.     ONCOLOGY HISTORY:    L Refill   • omeprazole 20 MG Oral Capsule Delayed Release Take 20 mg by mouth every morning before breakfast.     • OLANZapine 2.5 MG Oral Tab Take 1-2 tablets (2.5-5 mg total) by mouth nightly.  60 tablet 3   • apixaban (ELIQUIS) 5 MG Oral Tab Take 1 tablet Ref Range    Glucose 203 (H) 70 - 99 mg/dL    Sodium 141 136 - 145 mmol/L    Potassium 3.3 (L) 3.5 - 5.1 mmol/L    Chloride 109 98 - 112 mmol/L    CO2 27.0 21.0 - 32.0 mmol/L    Anion Gap 5 0 - 18 mmol/L    BUN 10 7 - 18 mg/dL    Creatinine 0.97 0.70 - 1.3 responding thus far with improvement radiologically and tumor marker wise. Genetic testing negative. Restaging 11/19-improvement. Restaging CT 1/20 shows progression with new nodules in lung and liver as well as lytic area left fourth rib.   He started diana

## 2020-03-25 NOTE — PROGRESS NOTES
Pt here for C 24 D 1 FOLFOX.   Arrives Ambulating independently, accompanied by Self and Family member           Patient reports possible pregnancy since last therapy cycle: Not Applicable    Modifications in dose or schedule: No     Frequency of blood retu

## 2020-03-31 NOTE — TELEPHONE ENCOUNTER
Reviewed with patient that we need to reschedule the appt next week, due to schedule changes. Rescheduled to the Friday when physician is available. Confirmed that it will work out with the pump removal on Sunday   Will call back to reschedule if needed.

## 2020-04-09 NOTE — PROGRESS NOTES
[facility] Progress Note      Patient Name:  Vickey Dunne  YOB: 1950  Medical Record Number:  [id number]    Date of visit: 4/10/2020    CHIEF COMPLAINT: Metastatic pancreatic carcinoma, daphne PE, L leg DVT.     ONCOLOGY HISTORY:    L Dispense Refill   • pancrelipase, Lip-Prot-Amyl, 5000-63157 units Oral Cap DR Particles Take 1 capsule (5,000 Units total) by mouth 3 (three) times daily with meals.  90 capsule 3   • Ondansetron HCl (ZOFRAN) 8 MG tablet Take 1 tablet (8 mg total) by mouth hour(s))   COMP METABOLIC PANEL (14)    Collection Time: 04/10/20  9:16 AM   Result Value Ref Range    Glucose 209 (H) 70 - 99 mg/dL    Sodium 141 136 - 145 mmol/L    Potassium 3.6 3.5 - 5.1 mmol/L    Chloride 108 98 - 112 mmol/L    CO2 26.0 21.0 - 32.0 mm (A) Normal, Slide reviewed, see previous RBC morphology.     Platelet Morphology Normal Normal    Macrocytosis 1+         ASSESSMENT AND PLAN:       # Metastatic pancreatic carcinoma (MSI-stable, no targets NGS): 79year old was metastatic pancreatic carcin [address]    4/10/2020

## 2020-04-10 NOTE — PROGRESS NOTES
Pt here for C10.   Arrives Ambulating independently, accompanied by Self           Patient reports possible pregnancy since last therapy cycle: Not Applicable    Modifications in dose or schedule: Yes: dose reduction due to plts     Frequency of blood retur

## 2020-04-13 NOTE — TELEPHONE ENCOUNTER
Spoke to Tha, no further bleeding from port site.  Asked her to call if bleeding recurs or if there is erythema or pain

## 2020-04-24 NOTE — PROGRESS NOTES
Pt here for C26D1 FOLFOX .   Arrives Ambulating independently, accompanied by Self           Patient reports possible pregnancy since last therapy cycle: Not Applicable    Modifications in dose or schedule: Yes dose of 5FU pump was increased to 2400mg/m2 an

## 2020-04-24 NOTE — PATIENT INSTRUCTIONS
Call [phone] for any questions or concerns. You will receive a white blood cell booster shot when you come on 4/26 for your pump disconnection.

## 2020-04-24 NOTE — PROGRESS NOTES
[facility] Progress Note      Patient Name:  Chris Alcazar  YOB: 1950  Medical Record Number:  [id number]    Date of visit: 4/24/2020    CHIEF COMPLAINT: Metastatic pancreatic carcinoma, daphne PE, L leg DVT.     ONCOLOGY HISTORY:    L Medication Sig Dispense Refill   • pancrelipase, Lip-Prot-Amyl, 5000-75801 units Oral Cap DR Particles Take 1 capsule (5,000 Units total) by mouth 3 (three) times daily with meals.  90 capsule 3   • Ondansetron HCl (ZOFRAN) 8 MG tablet Take 1 tablet (8 mg Results (from the past 24 hour(s))   COMP METABOLIC PANEL (14)    Collection Time: 04/24/20 10:15 AM   Result Value Ref Range    Glucose 206 (H) 70 - 99 mg/dL    Sodium 140 136 - 145 mmol/L    Potassium 3.7 3.5 - 5.1 mmol/L    Chloride 108 98 - 112 mmol/L pancreatic carcinoma (MSI-stable, no targets NGS): 79year old was metastatic pancreatic carcinoma diagnosed 2/19, lung and liver metastasis at presentation. Started palliative chemotherapy with FOLFIRINOX on 3/12/19. Oxaliplatin held after cycle # 7.   [address], [place], [address]    4/24/2020

## 2020-05-07 NOTE — PROGRESS NOTES
[facility] Progress Note      Patient Name:  Scott Bond  YOB: 1950  Medical Record Number:  [id number]    Date of visit: 5/8/2020    CHIEF COMPLAINT: Metastatic pancreatic carcinoma, daphne PE, L leg DVT.     ONCOLOGY HISTORY:    L Current Outpatient Medications   Medication Sig Dispense Refill   • pancrelipase, Lip-Prot-Amyl, 5000-62854 units Oral Cap DR Particles Take 1 capsule (5,000 Units total) by mouth 3 (three) times daily with meals.  90 capsule 3   • omeprazole 20 MG Oral atelectasis or scarring which is stable within the superior segment of the left lower lobe medially. There is subsegmental atelectasis or scarring within the lingula. MEDIASTINUM:  No mass or adenopathy. DEYVI:  No mass or adenopathy.   CARDIAC:  No enlarg containing inguinal hernia on the left. URINARY BLADDER:  No visible focal wall thickening, lesion, or calculus. PELVIC NODES:  No adenopathy. PELVIC ORGANS:  No visible mass. Pelvic organs appropriate for patient age.   BONES:  No bony lesion or fractur 4.0 - 11.0 x10(3) uL    RBC 3.80 3.80 - 5.80 x10(6)uL    HGB 12.3 (L) 13.0 - 17.5 g/dL    HCT 38.8 (L) 39.0 - 53.0 %    PLT 87.0 (L) 150.0 - 450.0 10(3)uL    .1 (H) 80.0 - 100.0 fL    MCH 32.4 26.0 - 34.0 pg    MCHC 31.7 31.0 - 37.0 g/dL    RDW 17. 1 bus trip to [facility].  However given metastatic pancreatic carcinoma, he is a candidate for indefinite anticoagulation. Currently on apixaban. # RUQ: likely due to progression of liver mets, change regimen and follow.     # Anorexia: better, using medical

## 2020-05-08 NOTE — PROGRESS NOTES
Reports pain under right side ribs with cough, deep breath or any kind of strain. Appetite worse the last week or so. Able to mow the lawn.    Outpatient Oncology Care Plan  Problem list:  fatigue  knowledge deficit  nutrition    Problems related to:

## 2020-05-08 NOTE — PROGRESS NOTES
Pt here for [id number]/Gemzar.   Arrives Ambulating independently, accompanied by Self           Patient reports possible pregnancy since last therapy cycle: Not Applicable    Modifications in dose or schedule: No     Frequency of blood return and site

## 2020-05-12 NOTE — TELEPHONE ENCOUNTER
Reviewed with daughter Vanessa Drafts plan to treat the thrush with diflucan. Reviewed how to take. Questions addressed.

## 2020-05-13 NOTE — PROGRESS NOTES
[facility] Progress Note      Patient Name:  Yolanda Curran  YOB: 1950  Medical Record Number:  [id number]    Date of visit: 5/14/2020    CHIEF COMPLAINT: Metastatic pancreatic carcinoma, daphne PE, L leg DVT.     ONCOLOGY HISTORY:    L confusion or depression   Heme: no easy bruising or bleeding     ALLERGIES:  No Known Allergies    MEDICATIONS:      Current Outpatient Medications   Medication Sig Dispense Refill   • fluconazole 100 MG Oral Tab Two tablets on the first day and then one t no targets NGS), genetic testing negative: 79year old was metastatic pancreatic carcinoma diagnosed 2/19, lung and liver metastasis at presentation. Started palliative chemotherapy with FOLFIRINOX on 3/12/19. Oxaliplatin held after cycle # 7.   Restaging

## 2020-05-14 NOTE — PROGRESS NOTES
Pt came in for treatment, but due to results of labs, treatment was canceled for today per md (Plt 65). Will continue to monitor.

## 2020-05-14 NOTE — PROGRESS NOTES
Started diflucan yesterday. Last night reports a mouth sore he says it was a blister, that has popped. Feeling better since starting medicine for thrush. No pain.    Outpatient Oncology Care Plan  Problem list:  {em onc outpatient oncology problem list

## 2020-05-20 NOTE — PROGRESS NOTES
[facility] Progress Note      Patient Name:  Skyla Purvis  YOB: 1950  Medical Record Number:  [id number]    Date of visit: 5/14/2020    CHIEF COMPLAINT: Metastatic pancreatic carcinoma, daphne PE, L leg DVT.     ONCOLOGY HISTORY:    L confusion or depression   Heme: no easy bruising or bleeding     ALLERGIES:  No Known Allergies    MEDICATIONS:      Current Outpatient Medications   Medication Sig Dispense Refill   • fluconazole 100 MG Oral Tab Two tablets on the first day and then one t Metastatic pancreatic carcinoma (MSI-stable, no targets NGS), genetic testing negative: 79year old was metastatic pancreatic carcinoma diagnosed 2/19, lung and liver metastasis at presentation. Started palliative chemotherapy with FOLFIRINOX on 3/12/19.

## 2020-05-21 NOTE — PROGRESS NOTES
Pt here for C4D1. Arrives Ambulating independently, accompanied by Self          Modifications in dose or schedule: yes.  Dose reduced     Frequency of blood return and site check throughout administration: Prior to administration   Discharged to Home, Amb

## 2020-05-27 NOTE — PROGRESS NOTES
[facility] Progress Note      Patient Name:  Kamala Callaway  YOB: 1950  Medical Record Number:  [id number]    Date of visit: 5/28/2020    CHIEF COMPLAINT: Metastatic pancreatic carcinoma, daphne PE, L leg DVT.     ONCOLOGY HISTORY:    L rash, pruritis  : no hematuria, dysuria or frequency  Psych: no confusion or depression   Heme: no easy bruising or bleeding     ALLERGIES:  No Known Allergies    MEDICATIONS:      Current Outpatient Medications   Medication Sig Dispense Refill   • fluco well being was assessed. No issues requiring acute psychosocial intervention were identified.      IMAGING:      LABS:     Recent Results (from the past 24 hour(s))   CARBOHYDRATE ANTIGEN 19-9    Collection Time: 05/28/20 10:17 AM   Result Value Ref Range Eosinophil % 5.2 %    Basophil % 0.6 %    Immature Granulocyte % 0.2 %       ASSESSMENT AND PLAN:       # Metastatic pancreatic carcinoma (MSI-stable, no targets NGS), genetic testing negative: 79year old was metastatic pancreatic carcinoma diagnosed 2/19 ask questions. ORDERS PLACED:      Return in about 2 weeks (around 6/11/2020) for Labs, MD visit, Chemo. Jaleel Rodas M.D.     [facility]    [address], [address]    5/28/2020

## 2020-05-28 NOTE — PROGRESS NOTES
More tired with this treatment. C/o back pain. No mouth pain.  Outpatient Oncology Care Plan  Problem list:  {em onc outpatient oncology problem list:5941}    Problems related to:    {em onc outpatient oncology problem related to:5945}    Interventions:

## 2020-05-28 NOTE — PROGRESS NOTES
Chemotherapy given per MD orders w/out incident. Pt d/c home in stable condition with no complaints. AVS provided w/ f/u appt.

## 2020-06-10 NOTE — PROGRESS NOTES
[facility] Progress Note      Patient Name:  Lul Wilson  YOB: 1950  Medical Record Number:  [id number]    Date of visit: 6/11/2020      CHIEF COMPLAINT: Metastatic pancreatic carcinoma, daphne PE, L leg DVT.     ONCOLOGY HISTORY: alopecia, rash, pruritis  : no hematuria, dysuria or frequency  Psych: no confusion or depression   Heme: no easy bruising or bleeding     ALLERGIES:  No Known Allergies    MEDICATIONS:      Current Outpatient Medications   Medication Sig Dispense Refill identified. IMAGING:      LABS:     Recent Results (from the past 24 hour(s))   COMP METABOLIC PANEL (14)    Collection Time: 06/11/20 10:02 AM   Result Value Ref Range    Glucose 203 (H) 70 - 99 mg/dL    Sodium 140 136 - 145 mmol/L    Potassium 3.8 3. AND PLAN:       # Metastatic pancreatic carcinoma (MSI-stable, no targets NGS), genetic testing negative: 79year old was metastatic pancreatic carcinoma diagnosed 2/19, lung and liver metastasis at presentation. Started  FOLFIRINOX 3/19.  Oxaliplatin held [id number]    6/11/2020

## 2020-06-11 NOTE — PROGRESS NOTES
Low appetite, and feet feel heavy. Altered taste. Increased fatigue.    Went to PT once, but not continuing due to cost.   Outpatient Oncology Care Plan  Problem list:  fatigue  nutrition    Problems related to:    chemotherapy  side effect of treatment

## 2020-06-11 NOTE — PROGRESS NOTES
Pt here for C2D1.   Arrives Ambulating independently, accompanied by Self           Patient reports possible pregnancy since last therapy cycle: Yes    Modifications in dose or schedule: Yes     [facility]

## 2020-06-23 NOTE — PROGRESS NOTES
[facility] Progress Note      Patient Name:  Emily Conrad  YOB: 1950  Medical Record Number:  [id number]    Date of visit: 6/24/2020    CHIEF COMPLAINT: Metastatic pancreatic carcinoma, daphne PE, L leg DVT.     ONCOLOGY HISTORY:    L pain  Dermatologic: no alopecia, rash, pruritis  : no hematuria, dysuria or frequency  Psych: no confusion or depression   Heme: no easy bruising or bleeding     ALLERGIES:  No Known Allergies    MEDICATIONS:      Current Outpatient Medications   Medicat intervention were identified.      IMAGING:      LABS:     Recent Results (from the past 24 hour(s))   COMP METABOLIC PANEL (14)    Collection Time: 06/24/20  8:58 AM   Result Value Ref Range    Glucose 189 (H) 70 - 99 mg/dL    Sodium 140 136 - 145 mmol/L negative: 79year old was metastatic pancreatic carcinoma diagnosed 2/19, lung and liver metastasis at presentation. Started  FOLFIRINOX 3/19. Oxaliplatin held after cycle # 7.   Restaging CT 1/20 shows progression with new nodules in lung and liver as wel

## 2020-06-24 NOTE — PROGRESS NOTES
Here for cycle 2 day 15, gemzar /abraxane. Low appetite. Take 1 protein shake per day. Reports some pain in his left chest area with cough. He has been more active with yard work.    Outpatient Oncology Care Plan  Problem list:  loss of appetite  nutri

## 2020-06-24 NOTE — PROGRESS NOTES
Pt here for C2D15.   Arrives Ambulating independently, accompanied by Self           Patient reports possible pregnancy since last therapy cycle: Not Applicable    Modifications in dose or schedule: No     Frequency of blood return and site check throughout

## 2020-07-07 NOTE — PROGRESS NOTES
[facility] Progress Note      Patient Name:  Pushpa Hughes  YOB: 1950  Medical Record Number:  [id number]    Date of visit: 7/8/2020    CHIEF COMPLAINT: Metastatic pancreatic carcinoma, daphne PE, L leg DVT.     ONCOLOGY HISTORY:    L constipation, anorexia.     Musculoskeletal: L rib pain  Dermatologic: no alopecia, rash, pruritis  : no hematuria, dysuria or frequency  Psych: no confusion or depression   Heme: no easy bruising or bleeding     ALLERGIES:  No Known Allergies    MEDICATI testing negative: 79year old was metastatic pancreatic carcinoma diagnosed 2/19, lung and liver metastasis at presentation. Started  FOLFIRINOX 3/19. Oxaliplatin held after cycle # 7.   Restaging CT 1/20 shows progression with new nodules in lung and live [place]  [address], [place], [id number]    7/8/2020

## 2020-07-08 NOTE — PROGRESS NOTES
MD follow up cycle 3 gemzar Abraxane  Loss of appetite, and altered taste  Reports neuropathy pain daphne legs below the knees. Some low back pain with sitting too long.     Outpatient Oncology Care Plan  Problem list:  loss of appetite  knowledge deficit

## 2020-07-08 NOTE — PROGRESS NOTES
Pt here for C 3 D 1 abraxane,gemzar.   Arrives Ambulating independently, accompanied by Self           Patient reports possible pregnancy since last therapy cycle: Not Applicable    Modifications in dose or schedule: No     Frequency of blood return and sit

## 2020-07-15 NOTE — TELEPHONE ENCOUNTER
Can you please schedule VV with patient, specialist checked blood sugar, in diabetic range, we need to talk treatment, thanks       Left message for the daughter to call back to schedule a video visit for the pt to discuss his blood sugar

## 2020-07-21 NOTE — PROGRESS NOTES
[facility] Progress Note      Patient Name:  Chris Alcazar  YOB: 1950  Medical Record Number:  [id number]    Date of visit: 7/22/2020    CHIEF COMPLAINT: Metastatic pancreatic carcinoma, daphne PE, L leg DVT.     ONCOLOGY HISTORY:    L constipation, anorexia.     Musculoskeletal: L rib pain  Dermatologic: no alopecia, rash, pruritis  : no hematuria, dysuria or frequency  Psych: no confusion or depression   Heme: no easy bruising or bleeding     ALLERGIES:  No Known Allergies    MEDICATI - 145 mmol/L    Potassium 3.8 3.5 - 5.1 mmol/L    Chloride 107 98 - 112 mmol/L    CO2 28.0 21.0 - 32.0 mmol/L    Anion Gap 4 0 - 18 mmol/L    BUN 12 7 - 18 mg/dL    Creatinine 0.96 0.70 - 1.30 mg/dL    BUN/CREA Ratio 12.5 10.0 - 20.0    Calcium, Total 8.7 as well as lytic area left fourth rib. Received FOLFOX from 2/20-4/20. CT 5/20 progression. Switched to gemcitabine/Abraxane 5/8/2020. Cycle 1 day 8 delayed by a week due to thrombocytopenia and gemcitabine reduced to 750 mg/m².   Alternating chemo with

## 2020-07-22 NOTE — PROGRESS NOTES
Pt here for C3D15.   Arrives Ambulating independently, accompanied by Family member           Patient reports possible pregnancy since last therapy cycle: Not Applicable    Modifications in dose or schedule: No     Frequency of blood return and site check t

## 2020-07-22 NOTE — TELEPHONE ENCOUNTER
Called daughter back and they would prefer to do an actual visit so one of the daughter can be there to hear the plan so that they can help reinforce the recommendations    Are you ok with him coming in the office?

## 2020-07-22 NOTE — TELEPHONE ENCOUNTER
I'd prefer VV if dtr can be present for that, let's keep him out of office if possible with his chemo

## 2020-07-22 NOTE — TELEPHONE ENCOUNTER
Spoke with [place] [facility] and advised of the notes from Dr. Titus Washington and she v/u she states that she will get in touch with her sister to schedule the video visit  She asks about additional testing and advised that it looks like Dr. Minh Whitt wants to start treatment and t

## 2020-07-22 NOTE — TELEPHONE ENCOUNTER
DAUGHTER CALLED AND ADV THAT PT HAS BEEN GOING THROUGH WITH SOME CHEMO TREATMENTS AND WAS ADV THAT PTS BLOOD SUGAR HAS BEEN ELEVATED.     WOULD LIKE TO SEE IF DR ROMAN CAN TAKE A LOOK AT PREVIOUS BLOOD WORK AND ADD ANY RECOMMENDATIONS OR SHOULD PT BE SEEN?

## 2020-07-22 NOTE — PROGRESS NOTES
MD follow up , D 15 cycle 3 gemzar/  abraxane. Some SOB with exertion. Denies N/V. Family noted like snoring noise when he is at rest for longer period of time.    Outpatient Oncology Care Plan  Problem list:  loss of appetite  knowledge deficit  nutr

## 2020-07-29 NOTE — TELEPHONE ENCOUNTER
Daughter called, would like to discuss pt's blood work from Dr. Libby Gibson.   Please call Pavithra Bower at [phone]

## 2020-07-29 NOTE — TELEPHONE ENCOUNTER
Spoke with [place] [facility] and advised that we are not doing blood work yet as we already have the numbers we need for the blood sugar- she wants me to talk to her sister nilam about the appt- I called Meghana and left a message  advised that she can talk directly

## 2020-08-04 NOTE — PROGRESS NOTES
[facility] Progress Note      Patient Name:  Elham Lisa  YOB: 1950  Medical Record Number:  [id number]    Date of visit: 8/5/2020    CHIEF COMPLAINT: Metastatic pancreatic carcinoma, daphne PE, L leg DVT.     ONCOLOGY HISTORY:    L anorexia.     Musculoskeletal: L rib pain  Dermatologic: no alopecia, rash, pruritis  : no hematuria, dysuria or frequency  Psych: no confusion or depression   Heme: no easy bruising or bleeding     ALLERGIES:  No Known Allergies    MEDICATIONS:      Curr Potassium 4.0 3.5 - 5.1 mmol/L    Chloride 107 98 - 112 mmol/L    CO2 27.0 21.0 - 32.0 mmol/L    Anion Gap 4 0 - 18 mmol/L    BUN 8 7 - 18 mg/dL    Creatinine 0.89 0.70 - 1.30 mg/dL    BUN/CREA Ratio 9.0 (L) 10.0 - 20.0    Calcium, Total 8.7 8.5 - 10.1 mg/ Started  FOLFIRINOX 3/19. Oxaliplatin held after cycle # 7. Restaging CT 1/20 shows progression with new nodules in lung and liver as well as lytic area left fourth rib. Received FOLFOX from 2/20-4/20. CT 5/20 progression.   Switched to gemcitabine/Abraxa

## 2020-08-04 NOTE — TELEPHONE ENCOUNTER
Puneet Chahal wants to speak to [facility] about other doctor before scheduling an appointment for her dad

## 2020-08-05 NOTE — PROGRESS NOTES
Pt here for C4D1.   Arrives Ambulating independently, accompanied by Family member           Patient reports possible pregnancy since last therapy cycle: Not Applicable    Modifications in dose or schedule: No     Frequency of blood return and site check th

## 2020-08-05 NOTE — PROGRESS NOTES
MD follow up, cycle 4 Gemzar / Abraxane. Doing ok, loss of appetite, denies N/V.    Fatigues easily   Outpatient Oncology Care Plan  Problem list:  {em onc outpatient oncology problem list:5941}    Problems related to:    {em onc outpatient oncology probl

## 2020-08-07 NOTE — PROGRESS NOTES
Braulio Leon is a 79year old male.   HPI:   Virtual Video/Telephone Check-In    Braulio Leon verbally consents to a [employer] video visit on 8/7/2020    Patient understands and accepts financial responsibility for any deductible, co-insurance and/or co- Oral Tab Take 1-2 tablets (2.5-5 mg total) by mouth nightly. 60 tablet 3   • apixaban (ELIQUIS) 5 MG Oral Tab Take 1 tablet (5 mg total) by mouth 2 (two) times daily.  60 tablet 6   • ATORVASTATIN 20 MG Oral Tab TAKE 1 TABLET BY MOUTH EVERY DAY AT BEDTIME 9 mass  LUNGS: normal resp effort, no conversational dyspnea, no cough  CARDIO: appears well perfused  EXTREMITIES: moves all extremities  PSYCH: good affect, interactive, A&Ox2    ASSESSMENT AND PLAN:   Diagnoses and all orders for this visit:    Elevated h

## 2020-08-10 NOTE — TELEPHONE ENCOUNTER
From: Layne Armendariz  To:  Carlene Knight MD  Sent: 8/10/2020 1:46 PM CDT  Subject: Visit Follow-up Question    Hi, we got a message that my dad missed an appointment but I want to make sure that doesn't get marked that way because he did indeed have his vladimir

## 2020-08-12 NOTE — TELEPHONE ENCOUNTER
Do you know why it said missed appointment?   ANything we need to do (don't want them to be put on warning list or charged, we did our visit) thx

## 2020-08-17 NOTE — TELEPHONE ENCOUNTER
From: Lul Wilson  To: Boone Lynn MD  Sent: 8/14/2020 4:58 PM CDT  Subject: Other    Hello,    We were wondering if we could move my dad's next appointment from this coming Wednesday to Friday?  The 21st.    Thank you

## 2020-08-18 NOTE — TELEPHONE ENCOUNTER
Pt's daughter would like to know if [facility] sent over a new Glucose monitor to the pharmacy. She thought  was sending over another one that wasn't One touch Ultra.    Please return call to [phone]

## 2020-08-18 NOTE — TELEPHONE ENCOUNTER
I hadn't heard I was supposed to. I first sent accucheck and pharmacy told me that wasn't covered but the one touch ultra was, so I sent that. I hadn't heard that I needed to send a 3rd one.

## 2020-08-18 NOTE — TELEPHONE ENCOUNTER
Spoke with the daughter and advised that initally the accucheck was sent over but it was not covered by insurance  The one touch ultra is covered  She v/u

## 2020-08-20 NOTE — PROGRESS NOTES
[facility] Progress Note      Patient Name:  Tere Lincoln  YOB: 1950  Medical Record Number:  [id number]    Date of visit: 8/21/2020    CHIEF COMPLAINT: Metastatic pancreatic carcinoma, daphne PE, L leg DVT.     ONCOLOGY HISTORY:    L Musculoskeletal: L rib pain  Dermatologic: no alopecia, rash, pruritis  : no hematuria, dysuria or frequency  Psych: no confusion or depression   Heme: no easy bruising or bleeding     ALLERGIES:  No Known Allergies    MEDICATIONS:      Current Outpati deficit    Emotional well being: Patient's emotional well being was assessed. No issues requiring acute psychosocial intervention were identified.      IMAGING:      LABS:     Recent Results (from the past 24 hour(s))   COMP METABOLIC PANEL (14)    Collect PLAN:       # Metastatic pancreatic carcinoma (MSI-stable, no targets NGS), genetic testing negative: 79year old was metastatic pancreatic carcinoma diagnosed 2/19, lung and liver metastasis at presentation. Started  FOLFIRINOX 3/19.  Oxaliplatin held aft

## 2020-08-21 NOTE — PROGRESS NOTES
Pt here for C4D15.   Arrives Ambulating independently, accompanied by Self           Patient reports possible pregnancy since last therapy cycle: Not Applicable    Modifications in dose or schedule: No     Frequency of blood return and site check throughout

## 2020-08-21 NOTE — TELEPHONE ENCOUNTER
Needs lancets- they were sent on 8/11/2020  Spoke with the pharmacy and they do not have the script.     Called back and left the script on the voicemail

## 2020-08-21 NOTE — TELEPHONE ENCOUNTER
MICHELLE CALLED AND ADV THAT PT NEEDS LANCETS SCRIPTS. ADV THAT THERE WERE REFILLS SENT IN THE SAME DAY MONITOR WAS SENT IN    WAS ADV THEY DID NOT GET REFILLS FOR LANCET STRIPS.        OneTouch UltraSoft Lancets Does not apply Misc      [facility]

## 2020-08-27 NOTE — PROGRESS NOTES
[facility] Progress Note      Patient Name:  Sachin Mclean  YOB: 1950  Medical Record Number:  [id number]    Date of visit: 9/4/2020    CHIEF COMPLAINT: Metastatic pancreatic carcinoma, daphne PE, L leg DVT.     ONCOLOGY HISTORY:    L Musculoskeletal: L rib pain  Dermatologic: no alopecia, rash, pruritis  : no hematuria, dysuria or frequency  Psych: no confusion or depression   Heme: no easy bruising or bleeding     ALLERGIES:  No Known Allergies    MEDICATIONS:      Current Outpati deficit    Emotional well being: Patient's emotional well being was assessed. No issues requiring acute psychosocial intervention were identified. IMAGING:      LABS:     No results found for this or any previous visit (from the past 24 hour(s)).     A [id number]    9/4/2020

## 2020-09-04 NOTE — PROGRESS NOTES
MD follow up, cycle 5 gem/ abraxane  Main complaint of loss of appetite, weight stable. Some SOB.    Outpatient Oncology Care Plan  Problem list:  loss of appetite    Problems related to:    chemotherapy    Interventions:  chemotherapy teaching  educate r

## 2020-09-04 NOTE — PROGRESS NOTES
Pt here for C5D1 Abraxane/[place].   Arrives Ambulating independently, accompanied by Self           Patient reports possible pregnancy since last therapy cycle: Not Applicable    Modifications in dose or schedule: No     Frequency of blood return and site chec

## 2020-09-15 NOTE — TELEPHONE ENCOUNTER
[facility] called about the script for thetresiba- they can not break up the boxes and these pens come with 15ml in a box  They want to be able to dispense a box- advised ok to dispense 15ml

## 2020-09-15 NOTE — TELEPHONE ENCOUNTER
Fax from [employer] requesting PA for 24h00 [phone]  ID [id number]  Group [id number]      Spoke with Rafa Mace at number above who states Toujeo is not on formulary.   Levemir, treseba and Basaglar are preferred

## 2020-09-17 NOTE — PROGRESS NOTES
[facility] Progress Note      Patient Name:  Reza Farrar  YOB: 1950  Medical Record Number:  [id number]    Date of visit: 9/18/2020    CHIEF COMPLAINT: Metastatic pancreatic carcinoma, daphne PE, L leg DVT.     ONCOLOGY HISTORY:    L dysuria or frequency  Psych: no confusion or depression   Heme: no easy bruising or bleeding     ALLERGIES:  No Known Allergies    MEDICATIONS:      Current Outpatient Medications   Medication Sig Dispense Refill   • OneTouch UltraSoft Lancets Does not vladimir hepato-splenomegaly. Extremities:  No edema. CNS: no focal deficit    Emotional well being: Patient's emotional well being was assessed. No issues requiring acute psychosocial intervention were identified.      IMAGING:    Result Date: 9/13/2020    [address] [place] [address] Unremarkable. AORTA/VASCULAR:  No aortic aneurysm. The previously demonstrated right lower lobe filling defect from pulmonary embolism appears resolved  RETROPERITONEUM:  Unremarkable. BOWEL/MESENTERY:  No acute process. ABDOMINAL WALL:  Unremarkable. -American 103 >=60    AST 24 15 - 37 U/L    ALT 59 16 - 61 U/L    Alkaline Phosphatase 296 (H) 45 - 117 U/L    Bilirubin, Total 0.4 0.1 - 2.0 mg/dL    Total Protein 8.1 6.4 - 8.2 g/dL    Albumin 3.2 (L) 3.4 - 5.0 g/dL    Globulin  4.9 (H) 2.8 - 4.4 discontinued and he continued FOLFIRI. Discussed that he did not feel oxaliplatin. Discussed risk of worsening neuropathy. Also discussed with both daughters at length that he is not a candidate for olaparib or immunotherapy.   Recommend he go back to

## 2020-09-18 NOTE — PROGRESS NOTES
MD visit. Pt feeling more tired. Appetite is very low, with weight loss, never recovers like used to with other treatment. Worse SOB with exertion. BS being addressed by PCP.

## 2020-09-25 NOTE — TELEPHONE ENCOUNTER
Pt's daughter called wanting to know if Dr received a message from the oncologist about insulin?  Please advise

## 2020-09-25 NOTE — TELEPHONE ENCOUNTER
Yes, that was great to here he okay with an oral med. If they'd like to switch to metformin that's fine with me, metformin ER 500mg 1 po qd x 1 week, then 2 po qd x 1 week, then 3 po qd x 1 week, then 4po qd thereaftere.   Common to have some GI discomfort

## 2020-09-25 NOTE — TELEPHONE ENCOUNTER
Spoke with the pt and advised of the notes from Dr. Guzmán Dears advised of the instructions and dtr v/u  She also states that the lancets that we ordered are not correct- they do not fit in the lancet device- they need the correct lancets  Checked on line and it

## 2020-10-01 NOTE — PROGRESS NOTES
[facility] Progress Note      Patient Name:  Braulio Leon  YOB: 1950  Medical Record Number:  [id number]    Date of visit: 10/2/2020    CHIEF COMPLAINT: Metastatic pancreatic carcinoma, daphne PE, L leg DVT.     ONCOLOGY HISTORY:    L pain  Dermatologic: no alopecia, rash, pruritis  : no hematuria, dysuria or frequency  Psych: no confusion or depression   Heme: no easy bruising or bleeding     ALLERGIES:  No Known Allergies    MEDICATIONS:      Current Outpatient Medications   Medicat No JVD /adenopathy. CVS: S1S2, regular  Rhythm, no murmurs. Lungs: clear. L chest wall-no pain on pressure  Abdomen: Soft, non tender, no hepato-splenomegaly. Extremities:  No edema.    CNS: no focal deficit    Emotional well being: Patient's emotional Chemotherapy switched FOLFOX. Initially received 7 cycles of FOLFIRINOX after which oxaliplatin was discontinued and he continued FOLFIRI. Tolerated well, proceed with Rx today. Discussed risk of worsening neuropathy.  Went to [facility] and trials are being

## 2020-10-02 NOTE — PROGRESS NOTES
MD visit, pt here for FOLFOX cycle 2  Pt reports low appetite, and daphne legs feel heavy to walk. occ pain by liver. Some fatigue.    Education Record    Learner:  Patient    Disease / Diagnosis: plan of care    Barriers / Limitations:  None   Comments:

## 2020-10-08 NOTE — PROGRESS NOTES
Nutrition re-screen complete as triggered by [facility] dx of Metastatic pancreatic carcinoma. Chart reviewed. Pt appears nutritionally stable at present. RD available on consult.

## 2020-10-12 NOTE — TELEPHONE ENCOUNTER
Last refilled on 9/10/19 for # 90 with 3 refills  Last lipid 12/8/18  Last OV televisit 8/7/20  Future Appointments   Date Time Provider Sonam Pérez   10/16/2020 11:00 AM [address] [place] [address]   10/16/2020 11:30 AM Kyra Baig MD [facility]

## 2020-10-15 NOTE — PROGRESS NOTES
[facility] Progress Note      Patient Name:  Virgil Felty  YOB: 1950  Medical Record Number:  [id number]    Date of visit: 10/16/2020    CHIEF COMPLAINT: Metastatic pancreatic carcinoma, daphne PE, L leg DVT.     ONCOLOGY HISTORY: anorexia.     Musculoskeletal: L rib pain  Dermatologic: no alopecia, rash, pruritis  : no hematuria, dysuria or frequency  Psych: no confusion or depression   Heme: no easy bruising or bleeding     ALLERGIES:  No Known Allergies    MEDICATIONS:      Curr reviewed and were stable. PS:  ECO    PHYSICAL EXAM:    General: alert and oriented x 3, not in acute distress. Alone. HEENT: NIECY, oropharynx clear, TMs normal.  Neck: supple. No JVD /adenopathy. CVS: S1S2, regular  Rhythm, no murmurs.    Lungs: 15.0 %    RDW-SD 55.9 (H) 35.1 - 46.3 fL    Neutrophil Absolute Prelim 2.88 1.50 - 7.70 x10 (3) uL    Neutrophil Absolute 2.88 1.50 - 7.70 x10(3) uL    Lymphocyte Absolute 1.45 1.00 - 4.00 x10(3) uL    Monocyte Absolute 0.84 0.10 - 1.00 x10(3) uL    Eosino mets, pain better today    # Anorexia: better, using medical marijuana. # Neuropathy: Mild and stable, predominantly in legs. Will likely worsen with oxaliplatin.     A total of 45 minutes were spent  with the patient during this encounter and over  50%

## 2020-10-16 NOTE — TELEPHONE ENCOUNTER
Spoke to Meghana, tumor marker much higher. [place] stopping chemo. No SOC options left. They will contact [facility]. Should come for pump disconnect but no need further chemo appts. Should call for questions/concerns.

## 2020-10-20 NOTE — TELEPHONE ENCOUNTER
Twyla Khoury from [facility] in [employer] called asking if the patient has had any garden health drawn or genetic testing.

## 2020-11-03 NOTE — PROGRESS NOTES
spoke with Daughter Penny Vargas who requested assistance with Eliquis payment; application for [employer] emailed to daughter at [email or web address] and Doctor’s portion completed.  Waiting for family to submit patient portion in order to subm

## 2020-11-09 NOTE — PROGRESS NOTES
Referral called in to [facility] requesting [place]ian speaking staff if possible, or  services and Stat visit Amanda German). Information faxed. Arnold requested corrected order from SWETHA levine.

## 2020-11-10 NOTE — PROGRESS NOTES
Palliative Care Follow Up Note     Patient Name: Shannan Viera   YOB: 1950   Medical Record Number: [id number]   CSN: [id number]   Date of visit: 11/9/2020     Chief Complaint/Reason for Visit:  weakness     History of Present Illness: Onset   • Other (Other) Brother         in a wheel chair for back problems   • Obesity Sister      Social History:  Social History    Socioeconomic History      Marital status:       Spouse name: Not on file      Number of children: Not on file directed 1 kit 0   • pancrelipase, Lip-Prot-Amyl, 5000-33680 units Oral Cap DR Particles Take 1 capsule (5,000 Units total) by mouth 3 (three) times daily with meals.  (Patient not taking: Reported on 10/2/2020 ) 90 capsule 3   • omeprazole 20 MG Oral Capsu benefit from home O2 but will need a home study. Discussed getting a walker for better stability. Home palliative provide can help facilitate these. Family is open to home provider so order placed. Discussed completing a POLST.   Family has not disc

## 2020-11-16 NOTE — TELEPHONE ENCOUNTER
Spoke with Gabriel Pimentel and advised of the referral she v/u  States that she heard  From the daughter this AM about hospce and the nurse has already been sent out to the house to get the pt started

## 2020-11-16 NOTE — TELEPHONE ENCOUNTER
Mary from [facility] is calling to get orders for Hospice. The Daughter Taylor Sweet called and said they want to move forward. Please place order in [facility] for residential Hospice.    Please return call to [phone]

## 2020-11-17 NOTE — TELEPHONE ENCOUNTER
[address] [place] [address] at [phone] Patient is admitted to routine hospice at home on 11/16/20, Yung Arredondo would like to thank Dr. Ellen Mckenna for allowing her to care for her patient.

## 2020-11-18 ENCOUNTER — TELEPHONE (OUTPATIENT)
Dept: FAMILY MEDICINE CLINIC | Facility: CLINIC | Age: 70
End: 2020-11-18

## 2020-11-18 ENCOUNTER — TELEPHONE (OUTPATIENT)
Dept: HEMATOLOGY/ONCOLOGY | Facility: HOSPITAL | Age: 70
End: 2020-11-18

## 2020-11-18 NOTE — TELEPHONE ENCOUNTER
CHARIS CALLED FROM [facility]  HOME AND WOULD LIKE TO KNOW IF DR ROMAN WOULD SIGN OFF ON DEATH CERTIFICATE.     PLEASE ADV      THANK YOU

## 2020-11-18 NOTE — TELEPHONE ENCOUNTER
Kris Boogie from [facility] called, patient passed away 11/17 @ [phone], family present.

## 2020-11-18 NOTE — TELEPHONE ENCOUNTER
Called Keri # [facility]  home and advised that Dr. Eva Quiñones will sign the death certificate    She will fax it cover and confirmed fax number

## 2020-11-24 ENCOUNTER — MED REC SCAN ONLY (OUTPATIENT)
Dept: FAMILY MEDICINE CLINIC | Facility: CLINIC | Age: 70
End: 2020-11-24

## 2022-08-19 NOTE — PROGRESS NOTES
Education Record    Learner:  Patient    Disease / Diagnosis: pump d/c and udenyca inj. Patient says he feels great today, no issues at this time.      Barriers / Limitations:  None   Comments:    Method:  Brief focused   Comments:    General Topics:  Side FAMILY HISTORY:  Family history of diabetes mellitus    Grandparent  Still living? Unknown  Family history of cardiac disorder, Age at diagnosis: Age Unknown     FAMILY HISTORY:  Family history of diabetes mellitus    Father  Still living? No  FH: heart attack, Age at diagnosis: Age Unknown    Mother  Still living? Unknown  FH: heart attack, Age at diagnosis: Age Unknown    Grandparent  Still living? Unknown  Family history of cardiac disorder, Age at diagnosis: Age Unknown

## 2023-03-06 NOTE — PROGRESS NOTES
HPI:    Erik Samayoa is a 76year old male here today for hospital follow up.    He was discharged from Inpatient hospital, [facility] to Home   Admission Date: 2/19/19   Discharge Date: 2/22/19  Hospital Discharge Diagnoses (since 1/26/2019)   Non femoral vein through popliteal vein. Patient without chest pain, shortness of breath. No cough. No lightheadedness or dizziness.      CT chest completed which revealed acute bilateral PE, lung and liver mets.       Patient does not have a history of VTE.  Kat Kang AT BEDTIME    Quantity:  90 tablet  Refills:  3                         STOP taking these medications            aspirin 81 MG Tabs                                  Where to Get Your Medications              These medications were [facility] to [facility] Sunscreen Counseling: I recommended a zinc or titanium- based sunscreen with a SPF of 30 or higher. I explained that SPF 30 sunscreens block approximately 97 percent of the sun's harmful rays. Sunscreens should be applied at least 15 minutes prior to expected sun exposure and then every 2 hours after that as long as sun exposure continues. If swimming or exercising sunscreen should be reapplied every 45 minutes to an hour after getting wet or sweating. One ounce, or the equivalent of a shot glass full of sunscreen, is adequate to protect the skin not covered by a bathing suit. I also recommended a lip balm with a sunscreen as well. Sun protective clothing can be used in lieu of sunscreen but must be worn the entire time you are exposed to the sun's rays. Take 2 tablets (10 mg total) by mouth 2 (two) times daily. atorvastatin 20 MG Oral Tab TAKE 1 TABLET BY MOUTH EVERY NIGHT AT BEDTIME   HYDROcodone-acetaminophen 5-325 MG Oral Tab Take 1 tablet by mouth every 4 (four) hours as needed for Pain.      No curr Detail Level: Detailed lower leg  NECK: supple, no adenopathy, no JVD  CHEST: no chest tenderness  LUNGS: clear to auscultation  CARDIO: RRR without murmur  EXTREMITIES: no cyanosis, clubbing , mild swelling left lower leg  NEURO: Oriented times three, cranial nerves are intact, Products Recommended: Sunblock recommendation sheet available to patient

## 2023-06-19 NOTE — LETTER
[facility] [address], [address]  Consent for Procedure/Sedation    Date:     Time:       1. I authorize the performance upon [employer] the following:  VENOUS ACCESS PORT IMPLANT     2.  I authorize Dr. Jose Manuel Ochoa (and Janina Singh ________________________________    ___________________    Witness: _________________________      Date: ___________________    Printed: 3/6/2019   1:32 PM  Patient Name: Scott Bond        : 1950       Medical Record #: [id number]

## 2024-03-16 NOTE — PROGRESS NOTES
[facility] Progress Note    Patient Name: Jarrett Duran   YOB: 1950   Medical Record Number: [id number]   CSN: [id number]   Date of visit: 8/7/2019  Provider: SWETHA Sullivan  Referring Physician: No ref.  provider found    Problem L (08/07 0919)  Weight: 87.6 kg (193 lb 3.2 oz) (08/07 0919)  BSA (Calculated - sq m): 2.02 sq meters (08/07 0919)  Pulse: 64 (08/07 0919)  BP: 129/77 (08/07 0919)  Temp: 96 °F (35.6 °C) (08/07 0919)  Do Not Use - Resp Rate: --  SpO2: 95 % (08/07 0919) and rhythm  Abdomen:  Non-distended, normoactive bowel sounds, soft,nontender, no hepatosplenomegaly.   Extremities:  BLE  Trace edema, no tenderness  Neuro:  CN 2-12 intact    Labs:  Recent Results (from the past 24 hour(s))   CARBOHYDRATE ANTIGEN 19-9 Basophil Absolute Manual 0.00 0.00 - 0.20 x10(3) uL    Metamyelocyte Absolute Manual 0.47 (H) 0 x10(3) uL    Myelocyte Absolute Manual 0.16 (H) 0 x10(3) uL    Neutrophils % Manual 72 %    Band % 11 %    Lymphocyte % Manual 8 %    Monocyte % Manual 5 %    E no st elevation

## (undated) NOTE — LETTER
[facility] [address], [address]  Consent for Procedure/Sedation    Date:     Time:       1. I authorize the performance upon [employer] the following:  VENOUS ACCESS PORT IMPLANT     2.  I authorize Dr. Mary Kate Valdez (and Tejal Feng ________________________________    ___________________    Witness: _________________________      Date: ___________________    Printed: 3/6/2019   3:13 PM  Patient Name: Vickey Dunne        : 1950       Medical Record #: [id number]

## (undated) NOTE — LETTER
[address] [place] [address],[address]  Marii Osborne   Office: [phone]  Fax: [phone]  [email or web address]      Date:  5/8/2019     Patient:  Jarrett Duran         To Whom it may concern:    Jarrett Duran is currently being treated at the cancer center.  He

## (undated) NOTE — LETTER
Printed: 3/5/2019    Patient Name: Joe Mckinley  : 1950   Medical Record #: [id number]    Consent to Cancer Treatment    I, Joe Mckinley, understand that I have been diagnosed with metastatic pancreatic cancer.     I understand that the treat I have had the chance to ask questions about this treatment, and my questions have been answered to my satisfaction.   I understand that I can contact my oncologist,  or my Cancer Care Team at any time if I have questions, by calling [facility] at

## (undated) NOTE — LETTER
Printed: 2020    Patient Name: Jarrett Duran  : 1950   Medical Record #: [id number]    Consent to Chemotherapy    I, Jarrett Duran, understand that I have been diagnosed with metastatic pancreatic cancer.      I understand that the treatment s For patients requiring translation or verbal reading of this document, the person reading/translating should document and sign below:    Sledge/ Signature                                           Date

## (undated) NOTE — LETTER
[facility]  [address] [place] [address],[address]  Marii Osborne [facility]  Office: [phone]  Fax: [phone]  [email or web address]      Date:  5/8/2019     Patient:  Mayme Fontan         To Whom it may concern:    Mayme Fontan is currently bein

## (undated) NOTE — MR AVS SNAPSHOT
[address] [address]  [phone]               Thank you for choosing us for your health care visit with Jyoti Leggett MD.  We are glad to serve you and happy to provide you with this sum Your physician has ordered a radiology test that may require authorization from your insurance company.   Your physician or the clinic staff will work with your insurance company to obtain this authorization for your ordered radiology test.    To schedule a Encounter for hepatitis C screening test for low risk patient        Vitamin D deficiency        Colon cancer screening          Instructions and Information about Your Health     None      Allergies as of Mar 06, 2017     No Known Allergies TSH W REFLEX TO FREE T4      Component Value Standard Range & Units    TSH 2.390 0.350-5.500 mIU/mL                LIPID PANEL      Component Value Standard Range & Units    Cholesterol, Total 167 <200 mg/dL    Triglycerides 274 <150 mg/dL    HDL Choleste reflect the long term average glucose and may not correlate with random or fasting glucose levels since these represent specific points in time.                        PSA SCREEN      Component Value Standard Range & Units    Prostate Specific Antigen Scree Basophil Absolute 0.04 0.00-0.10 x10(3) uL    Immature Granulocyte Absolute 0.01 0.00-1.00 x10(3) uL    Neutrophil % 47.3 %    Lymphocyte % 36.6 %    Monocyte % 11.0 %    Eosinophil % 4.3 %    Basophil % 0.6 %    Immature Granulocyte % 0.2 %